# Patient Record
Sex: FEMALE | Race: WHITE | NOT HISPANIC OR LATINO | Employment: UNEMPLOYED | URBAN - NONMETROPOLITAN AREA
[De-identification: names, ages, dates, MRNs, and addresses within clinical notes are randomized per-mention and may not be internally consistent; named-entity substitution may affect disease eponyms.]

---

## 2017-05-09 ENCOUNTER — HOSPITAL ENCOUNTER (EMERGENCY)
Facility: HOSPITAL | Age: 22
Discharge: HOME | End: 2017-05-09
Attending: STUDENT IN AN ORGANIZED HEALTH CARE EDUCATION/TRAINING PROGRAM | Admitting: STUDENT IN AN ORGANIZED HEALTH CARE EDUCATION/TRAINING PROGRAM

## 2017-05-09 VITALS
OXYGEN SATURATION: 99 % | HEIGHT: 59 IN | SYSTOLIC BLOOD PRESSURE: 113 MMHG | WEIGHT: 87 LBS | DIASTOLIC BLOOD PRESSURE: 77 MMHG | HEART RATE: 104 BPM | TEMPERATURE: 98.5 F | BODY MASS INDEX: 17.54 KG/M2 | RESPIRATION RATE: 18 BRPM

## 2017-05-09 DIAGNOSIS — R35.0 URINARY FREQUENCY: Primary | ICD-10-CM

## 2017-05-09 LAB
B-HCG UR QL: NEGATIVE
BILIRUB UR QL STRIP: NEGATIVE
CLARITY UR: CLEAR
COLOR UR: YELLOW
GLUCOSE UR STRIP-MCNC: NEGATIVE MG/DL
HGB UR QL STRIP.AUTO: NEGATIVE
KETONES UR QL STRIP: NEGATIVE
LEUKOCYTE ESTERASE UR QL STRIP.AUTO: NEGATIVE
NITRITE UR QL STRIP: NEGATIVE
PH UR STRIP.AUTO: 6.5 [PH] (ref 5–8)
PROT UR QL STRIP: NEGATIVE
SP GR UR STRIP: 1.01 (ref 1–1.03)
UROBILINOGEN UR QL STRIP: NORMAL

## 2017-05-09 PROCEDURE — 81025 URINE PREGNANCY TEST: CPT | Performed by: STUDENT IN AN ORGANIZED HEALTH CARE EDUCATION/TRAINING PROGRAM

## 2017-05-09 PROCEDURE — 99283 EMERGENCY DEPT VISIT LOW MDM: CPT

## 2017-05-09 PROCEDURE — 81003 URINALYSIS AUTO W/O SCOPE: CPT | Performed by: STUDENT IN AN ORGANIZED HEALTH CARE EDUCATION/TRAINING PROGRAM

## 2017-07-06 ENCOUNTER — APPOINTMENT (OUTPATIENT)
Dept: CT IMAGING | Facility: HOSPITAL | Age: 22
End: 2017-07-06

## 2017-07-06 ENCOUNTER — HOSPITAL ENCOUNTER (EMERGENCY)
Facility: HOSPITAL | Age: 22
Discharge: HOME | End: 2017-07-06
Attending: EMERGENCY MEDICINE | Admitting: EMERGENCY MEDICINE

## 2017-07-06 VITALS
TEMPERATURE: 98 F | OXYGEN SATURATION: 100 % | DIASTOLIC BLOOD PRESSURE: 85 MMHG | WEIGHT: 87 LBS | RESPIRATION RATE: 18 BRPM | BODY MASS INDEX: 17.54 KG/M2 | SYSTOLIC BLOOD PRESSURE: 115 MMHG | HEART RATE: 78 BPM | HEIGHT: 59 IN

## 2017-07-06 DIAGNOSIS — N83.201 CYST OF RIGHT OVARY: ICD-10-CM

## 2017-07-06 DIAGNOSIS — R10.12 LEFT UPPER QUADRANT PAIN: Primary | ICD-10-CM

## 2017-07-06 LAB
ALBUMIN SERPL-MCNC: 4.4 G/DL (ref 3.5–5)
ALBUMIN/GLOB SERPL: 1.3 G/DL (ref 1–2)
ALP SERPL-CCNC: 62 U/L (ref 38–126)
ALT SERPL W P-5'-P-CCNC: 31 U/L (ref 13–69)
ANION GAP SERPL CALCULATED.3IONS-SCNC: 16.3 MMOL/L
AST SERPL-CCNC: 30 U/L (ref 15–46)
BASOPHILS # BLD AUTO: 0.01 10*3/MM3 (ref 0–0.2)
BASOPHILS NFR BLD AUTO: 0.2 % (ref 0–2.5)
BILIRUB SERPL-MCNC: 0.5 MG/DL (ref 0.2–1.3)
BILIRUB UR QL STRIP: NEGATIVE
BUN BLD-MCNC: 13 MG/DL (ref 7–20)
BUN/CREAT SERPL: 14.4 (ref 7.1–23.5)
CALCIUM SPEC-SCNC: 10.6 MG/DL (ref 8.4–10.2)
CHLORIDE SERPL-SCNC: 104 MMOL/L (ref 98–107)
CLARITY UR: CLEAR
CO2 SERPL-SCNC: 27 MMOL/L (ref 26–30)
COLOR UR: YELLOW
CREAT BLD-MCNC: 0.9 MG/DL (ref 0.6–1.3)
DEPRECATED RDW RBC AUTO: 40.4 FL (ref 37–54)
EOSINOPHIL # BLD AUTO: 0.15 10*3/MM3 (ref 0–0.7)
EOSINOPHIL NFR BLD AUTO: 2.5 % (ref 0–7)
ERYTHROCYTE [DISTWIDTH] IN BLOOD BY AUTOMATED COUNT: 15.3 % (ref 11.5–14.5)
GFR SERPL CREATININE-BSD FRML MDRD: 78 ML/MIN/1.73
GLOBULIN UR ELPH-MCNC: 3.4 GM/DL
GLUCOSE BLD-MCNC: 77 MG/DL (ref 74–98)
GLUCOSE UR STRIP-MCNC: NEGATIVE MG/DL
HCG SERPL QL: NEGATIVE
HCT VFR BLD AUTO: 37.4 % (ref 37–47)
HGB BLD-MCNC: 11.7 G/DL (ref 12–16)
HGB UR QL STRIP.AUTO: NEGATIVE
HOLD SPECIMEN: NORMAL
HOLD SPECIMEN: NORMAL
IMM GRANULOCYTES # BLD: 0.03 10*3/MM3 (ref 0–0.06)
IMM GRANULOCYTES NFR BLD: 0.5 % (ref 0–0.6)
KETONES UR QL STRIP: NEGATIVE
LEUKOCYTE ESTERASE UR QL STRIP.AUTO: NEGATIVE
LIPASE SERPL-CCNC: 115 U/L (ref 23–300)
LYMPHOCYTES # BLD AUTO: 1.32 10*3/MM3 (ref 0.6–3.4)
LYMPHOCYTES NFR BLD AUTO: 22 % (ref 10–50)
MCH RBC QN AUTO: 23.1 PG (ref 27–31)
MCHC RBC AUTO-ENTMCNC: 31.3 G/DL (ref 30–37)
MCV RBC AUTO: 73.9 FL (ref 81–99)
MICROCYTES BLD QL: NORMAL
MONOCYTES # BLD AUTO: 0.52 10*3/MM3 (ref 0–0.9)
MONOCYTES NFR BLD AUTO: 8.7 % (ref 0–12)
NEUTROPHILS # BLD AUTO: 3.97 10*3/MM3 (ref 2–6.9)
NEUTROPHILS NFR BLD AUTO: 66.1 % (ref 37–80)
NITRITE UR QL STRIP: NEGATIVE
NRBC BLD MANUAL-RTO: 0 /100 WBC (ref 0–0)
PH UR STRIP.AUTO: 7 [PH] (ref 5–8)
PLAT MORPH BLD: NORMAL
PLATELET # BLD AUTO: 206 10*3/MM3 (ref 130–400)
PMV BLD AUTO: 10.7 FL (ref 6–12)
POTASSIUM BLD-SCNC: 4.3 MMOL/L (ref 3.5–5.1)
PROT SERPL-MCNC: 7.8 G/DL (ref 6.3–8.2)
PROT UR QL STRIP: NEGATIVE
RBC # BLD AUTO: 5.06 10*6/MM3 (ref 4.2–5.4)
SODIUM BLD-SCNC: 143 MMOL/L (ref 137–145)
SP GR UR STRIP: 1.01 (ref 1–1.03)
UROBILINOGEN UR QL STRIP: NORMAL
WBC MORPH BLD: NORMAL
WBC NRBC COR # BLD: 6 10*3/MM3 (ref 4.8–10.8)
WHOLE BLOOD HOLD SPECIMEN: NORMAL
WHOLE BLOOD HOLD SPECIMEN: NORMAL

## 2017-07-06 PROCEDURE — 84703 CHORIONIC GONADOTROPIN ASSAY: CPT | Performed by: EMERGENCY MEDICINE

## 2017-07-06 PROCEDURE — 96361 HYDRATE IV INFUSION ADD-ON: CPT

## 2017-07-06 PROCEDURE — 25010000002 ONDANSETRON PER 1 MG: Performed by: EMERGENCY MEDICINE

## 2017-07-06 PROCEDURE — 74177 CT ABD & PELVIS W/CONTRAST: CPT

## 2017-07-06 PROCEDURE — 80053 COMPREHEN METABOLIC PANEL: CPT | Performed by: EMERGENCY MEDICINE

## 2017-07-06 PROCEDURE — 96375 TX/PRO/DX INJ NEW DRUG ADDON: CPT

## 2017-07-06 PROCEDURE — 81003 URINALYSIS AUTO W/O SCOPE: CPT | Performed by: EMERGENCY MEDICINE

## 2017-07-06 PROCEDURE — 99284 EMERGENCY DEPT VISIT MOD MDM: CPT

## 2017-07-06 PROCEDURE — 83690 ASSAY OF LIPASE: CPT | Performed by: EMERGENCY MEDICINE

## 2017-07-06 PROCEDURE — 0 IOPAMIDOL 61 % SOLUTION: Performed by: EMERGENCY MEDICINE

## 2017-07-06 PROCEDURE — 85007 BL SMEAR W/DIFF WBC COUNT: CPT | Performed by: EMERGENCY MEDICINE

## 2017-07-06 PROCEDURE — 85025 COMPLETE CBC W/AUTO DIFF WBC: CPT | Performed by: EMERGENCY MEDICINE

## 2017-07-06 PROCEDURE — 96374 THER/PROPH/DIAG INJ IV PUSH: CPT

## 2017-07-06 RX ORDER — FAMOTIDINE 40 MG/1
40 TABLET, FILM COATED ORAL DAILY
Qty: 30 TABLET | Refills: 0 | Status: SHIPPED | OUTPATIENT
Start: 2017-07-06 | End: 2018-07-24

## 2017-07-06 RX ORDER — ALUMINA, MAGNESIA, AND SIMETHICONE 2400; 2400; 240 MG/30ML; MG/30ML; MG/30ML
15 SUSPENSION ORAL ONCE
Status: COMPLETED | OUTPATIENT
Start: 2017-07-06 | End: 2017-07-06

## 2017-07-06 RX ORDER — PANTOPRAZOLE SODIUM 40 MG/10ML
40 INJECTION, POWDER, LYOPHILIZED, FOR SOLUTION INTRAVENOUS ONCE
Status: COMPLETED | OUTPATIENT
Start: 2017-07-06 | End: 2017-07-06

## 2017-07-06 RX ORDER — SODIUM CHLORIDE 0.9 % (FLUSH) 0.9 %
10 SYRINGE (ML) INJECTION AS NEEDED
Status: DISCONTINUED | OUTPATIENT
Start: 2017-07-06 | End: 2017-07-06 | Stop reason: HOSPADM

## 2017-07-06 RX ORDER — ONDANSETRON 2 MG/ML
4 INJECTION INTRAMUSCULAR; INTRAVENOUS ONCE
Status: COMPLETED | OUTPATIENT
Start: 2017-07-06 | End: 2017-07-06

## 2017-07-06 RX ADMIN — SODIUM CHLORIDE 1000 ML: 9 INJECTION, SOLUTION INTRAVENOUS at 11:03

## 2017-07-06 RX ADMIN — ALUMINUM HYDROXIDE, MAGNESIUM HYDROXIDE, AND DIMETHICONE 15 ML: 400; 400; 40 SUSPENSION ORAL at 11:05

## 2017-07-06 RX ADMIN — ONDANSETRON 4 MG: 2 INJECTION INTRAMUSCULAR; INTRAVENOUS at 11:05

## 2017-07-06 RX ADMIN — PANTOPRAZOLE SODIUM 40 MG: 40 INJECTION, POWDER, FOR SOLUTION INTRAVENOUS at 11:04

## 2017-07-06 RX ADMIN — LIDOCAINE HYDROCHLORIDE 15 ML: 20 SOLUTION ORAL; TOPICAL at 11:05

## 2017-07-06 RX ADMIN — IOPAMIDOL 100 ML: 612 INJECTION, SOLUTION INTRAVENOUS at 10:52

## 2017-07-06 NOTE — ED PROVIDER NOTES
Subjective   HPI Comments: 21-year-old female presenting with abdominal pain.  She states that for the last week she has felt intermittently nauseous, especially food.  The last 10 hours she's had severe sharp left upper quadrant pain, this doesn't radiate, there are no alleviating or aggravating factors.  She states that her mother has a history of an ulcer that she is concerned about the same thing and presented for evaluation.  She denies any fevers, chills, urinary complaints, flank pain, vaginal bleeding or discharge.  She states she has had other abdominal pain before but nothing similar to this.      Review of Systems   Constitutional: Negative for chills and fever.   HENT: Negative for congestion, rhinorrhea and sore throat.    Eyes: Negative for pain.   Respiratory: Negative for cough and shortness of breath.    Cardiovascular: Negative for chest pain, palpitations and leg swelling.   Gastrointestinal: Positive for abdominal pain and nausea. Negative for diarrhea and vomiting.   Genitourinary: Negative for dysuria.   Musculoskeletal: Negative for arthralgias.   Skin: Negative for rash.   Neurological: Negative for weakness and numbness.   Psychiatric/Behavioral: Negative for behavioral problems.       Past Medical History:   Diagnosis Date   • Depression    • GERD (gastroesophageal reflux disease)        Allergies   Allergen Reactions   • Latex Hives       Past Surgical History:   Procedure Laterality Date   •  SECTION      1   • EAR TUBES         Family History   Problem Relation Age of Onset   • Cancer Other    • Diabetes Other    • Hypertension Other    • Hyperlipidemia Other    • Liver disease Other      aunt has liver disease from hep c   • Mental illness Other    • Thyroid disease Other        Social History     Social History   • Marital status:      Spouse name: N/A   • Number of children: N/A   • Years of education: N/A     Social History Main Topics   • Smoking status: Former Smoker    • Smokeless tobacco: None   • Alcohol use No   • Drug use: No   • Sexual activity: Not Asked     Other Topics Concern   • None     Social History Narrative   • None           Objective   Physical Exam   Constitutional: She is oriented to person, place, and time. She appears well-developed and well-nourished. No distress.   HENT:   Head: Normocephalic and atraumatic.   Right Ear: External ear normal.   Left Ear: External ear normal.   Nose: Nose normal.   Mouth/Throat: Oropharynx is clear and moist.   Eyes: Conjunctivae and EOM are normal. Pupils are equal, round, and reactive to light.   Neck: Normal range of motion. Neck supple.   Cardiovascular: Normal rate, regular rhythm, normal heart sounds and intact distal pulses.    Pulmonary/Chest: Effort normal and breath sounds normal. No respiratory distress.   Abdominal: Soft. Bowel sounds are normal. She exhibits no distension. There is no rebound and no guarding.   Left upper quadrant and epigastric tenderness   Musculoskeletal: Normal range of motion. She exhibits no edema, tenderness or deformity.   Neurological: She is alert and oriented to person, place, and time.   Skin: Skin is warm and dry. No rash noted.   Psychiatric: She has a normal mood and affect. Her behavior is normal.   Nursing note and vitals reviewed.      Procedures         ED Course  ED Course                  MDM  Number of Diagnoses or Management Options  Cyst of right ovary:   Left upper quadrant pain:   Diagnosis management comments: 21-year-old female with abdominal pain and nausea.  Well-developed, well-nourished female in no distress with normal vital signs and exam as above.  We'll check labs, urinalysis, pregnancy test, CT scan.  We'll treat symptomatically.  Disposition pending workup.    DDX: Gastritis, biliary disease, ulcer disease, stone, UTI, pregnancy    Work up here unremarkable. CT shows incidental R ovarian cyst. Will discharge home with primary, GI and GYN follow  up.      Final diagnoses:   Left upper quadrant pain   Cyst of right ovary            Phill Abdi MD  07/06/17 7702

## 2017-08-22 ENCOUNTER — OFFICE VISIT (OUTPATIENT)
Dept: GASTROENTEROLOGY | Facility: CLINIC | Age: 22
End: 2017-08-22

## 2017-08-22 ENCOUNTER — PREP FOR SURGERY (OUTPATIENT)
Dept: OTHER | Facility: HOSPITAL | Age: 22
End: 2017-08-22

## 2017-08-22 VITALS
BODY MASS INDEX: 19.15 KG/M2 | RESPIRATION RATE: 14 BRPM | HEART RATE: 82 BPM | SYSTOLIC BLOOD PRESSURE: 105 MMHG | DIASTOLIC BLOOD PRESSURE: 75 MMHG | WEIGHT: 95 LBS | HEIGHT: 59 IN

## 2017-08-22 DIAGNOSIS — K59.00 CONSTIPATION, UNSPECIFIED CONSTIPATION TYPE: Chronic | ICD-10-CM

## 2017-08-22 DIAGNOSIS — R12 HEARTBURN: ICD-10-CM

## 2017-08-22 DIAGNOSIS — R10.13 EPIGASTRIC PAIN: Primary | Chronic | ICD-10-CM

## 2017-08-22 DIAGNOSIS — R11.0 NAUSEA: Primary | ICD-10-CM

## 2017-08-22 DIAGNOSIS — R11.0 NAUSEA: Chronic | ICD-10-CM

## 2017-08-22 DIAGNOSIS — R12 HEARTBURN: Chronic | ICD-10-CM

## 2017-08-22 DIAGNOSIS — R10.12 LEFT UPPER QUADRANT PAIN: ICD-10-CM

## 2017-08-22 DIAGNOSIS — R10.13 EPIGASTRIC PAIN: ICD-10-CM

## 2017-08-22 PROCEDURE — 99243 OFF/OP CNSLTJ NEW/EST LOW 30: CPT | Performed by: NURSE PRACTITIONER

## 2017-08-22 RX ORDER — SODIUM CHLORIDE 0.9 % (FLUSH) 0.9 %
1-10 SYRINGE (ML) INJECTION AS NEEDED
Status: CANCELLED | OUTPATIENT
Start: 2017-08-22

## 2017-08-22 RX ORDER — SODIUM CHLORIDE 9 MG/ML
70 INJECTION, SOLUTION INTRAVENOUS CONTINUOUS PRN
Status: CANCELLED | OUTPATIENT
Start: 2017-08-22

## 2017-08-22 NOTE — PROGRESS NOTES
[address]  [facility] [place] [address]    Chief Complaint   Patient presents with   • Abdominal Pain   • Nausea   • Heartburn     The patient has a history of epigastric/left upper quadrant since June 2017. The pain is constant. It is mild in nature. At times it is severe. It will radiate pain into her chest at times. The pains are sharp. Lifting items or carrying her child on her left hip will increase the pain. She has bee taking Pepcid and Protonix, without improvement.    The patient has been having nausea since June 2017. The nausea occurs throughout the day on a daily basis. She has not taken anything for the nausea. Nausea seems to be worse after meals.    The patient has a long-standing history of heartburn. It has not changed. Heartburn may occur 1-2 times per week. Heartburn is mild. It does wake her in the night. The patient does drink 4 cups of coffee and 4 cans of soda daily. The patient does not take NSAIDs.    The patient has a long-standing history of constipation. The patient may have 1 hard bowel movement once every 2 weeks. The patient is not taking anything for the constipation. There is no history of diarrhea. The patient denies bright red blood per rectum or melena.    The patient has not had colonoscopy in the past. No family history of colon cancer.    Abdominal Pain   This is a recurrent problem. Episode onset: June 2016. The onset quality is sudden. The problem occurs constantly. The problem has been unchanged. The pain is located in the epigastric region and LUQ. The pain is mild. The quality of the pain is sharp. The abdominal pain radiates to the chest. Associated symptoms include dysuria, headaches and nausea. Pertinent negatives include no arthralgias, constipation, diarrhea, fever, hematuria, myalgias or vomiting. Exacerbated by: lifting, holding children on her hip. The pain is relieved by nothing. She has tried proton pump inhibitors and H2 blockers for the symptoms. The  treatment provided no relief. Her past medical history is significant for GERD.   Nausea   This is a recurrent problem. Episode onset: June 2016. The problem occurs 2 to 4 times per day. The problem has been unchanged. Associated symptoms include abdominal pain, fatigue, headaches, nausea and weakness. Pertinent negatives include no arthralgias, chest pain, chills, coughing, fever, joint swelling, myalgias, rash or vomiting. The symptoms are aggravated by eating. She has tried nothing for the symptoms.   Heartburn   She complains of abdominal pain, heartburn and nausea. She reports no chest pain or no coughing. This is a chronic problem. Episode onset: 2014. The problem occurs occasionally. The problem has been unchanged. The heartburn duration is an hour. The heartburn is located in the substernum. The heartburn is of mild intensity. The heartburn wakes her from sleep. Nothing aggravates the symptoms. Associated symptoms include fatigue. Risk factors include caffeine use (The patient drinks 4 cups of coffee daily and 4 cans of soda daily.). She has tried a PPI and a histamine-2 antagonist for the symptoms. The treatment provided mild relief.   Constipation   This is a chronic problem. Episode onset: 2015. The problem is unchanged. Stool frequency: once every 2 weeks. The stool is described as firm. The patient is not on a high fiber diet. She exercises regularly. There has been adequate water intake. Associated symptoms include abdominal pain, back pain and nausea. Pertinent negatives include no diarrhea, fever or vomiting. She has tried nothing for the symptoms.     Review of Systems   Constitutional: Positive for appetite change, fatigue and unexpected weight change. Negative for chills and fever.   HENT: Negative for mouth sores, nosebleeds and trouble swallowing.    Eyes: Negative for discharge and redness.   Respiratory: Positive for shortness of breath. Negative for apnea and cough.    Cardiovascular:  Negative for chest pain, palpitations and leg swelling.   Gastrointestinal: Positive for abdominal pain, heartburn and nausea. Negative for abdominal distention, anal bleeding, blood in stool, constipation, diarrhea and vomiting.   Endocrine: Negative for cold intolerance, heat intolerance and polydipsia.   Genitourinary: Positive for dysuria. Negative for hematuria and urgency.   Musculoskeletal: Positive for back pain. Negative for arthralgias, joint swelling and myalgias.   Skin: Negative for rash.   Allergic/Immunologic: Negative for food allergies and immunocompromised state.   Neurological: Positive for dizziness, weakness and headaches. Negative for seizures and syncope.   Hematological: Negative for adenopathy. Bruises/bleeds easily.   Psychiatric/Behavioral: Negative for dysphoric mood. The patient is nervous/anxious. The patient is not hyperactive.      Patient Active Problem List   Diagnosis   • HPV in female   • Epigastric pain   • Left upper quadrant pain   • Nausea   • Heartburn   • Constipation     Past Medical History:   Diagnosis Date   • Abnormal Pap smear of cervix 2015    ASCUS +HPV   • Depression    • GERD (gastroesophageal reflux disease)    • History of blood transfusion    • History of Papanicolaou smear of cervix 2016    NORMAL    • HPV in female      Past Surgical History:   Procedure Laterality Date   •  SECTION  2016    DR MARC SALINAS   • EAR TUBES       Family History   Problem Relation Age of Onset   • Cancer Other    • Diabetes Other    • Hypertension Other    • Hyperlipidemia Other    • Liver disease Other      aunt has liver disease from hep c   • Mental illness Other    • Thyroid disease Other    • Ulcers Mother      Social History   Substance Use Topics   • Smoking status: Never Smoker   • Smokeless tobacco: Never Used   • Alcohol use No       Current Outpatient Prescriptions:   •  citalopram (CeleXA) 20 MG tablet, TAKE 1 TABLET BY MOUTH DAILY, Disp: ,  "Rfl: 2  •  gabapentin (NEURONTIN) 300 MG capsule, TAKE 1 CAPSULE BY MOUTH EVERY NIGHT., Disp: 30 capsule, Rfl: 1  •  famotidine (PEPCID) 40 MG tablet, Take 1 tablet by mouth Daily., Disp: 30 tablet, Rfl: 0  •  pantoprazole (PROTONIX) 40 MG EC tablet, Take 1 tablet by mouth Daily., Disp: 30 tablet, Rfl: 1    Allergies   Allergen Reactions   • Latex Hives     /75  Pulse 82  Resp 14  Ht 59\" (149.9 cm)  Wt 95 lb (43.1 kg)  LMP 06/19/2017  BMI 19.19 kg/m2    Physical Exam   Constitutional: She is oriented to person, place, and time. She appears well-developed and well-nourished. No distress.   HENT:   Head: Normocephalic and atraumatic.   Right Ear: Hearing and external ear normal.   Left Ear: Hearing and external ear normal.   Nose: Nose normal.   Mouth/Throat: Oropharynx is clear and moist and mucous membranes are normal. Mucous membranes are not pale, not dry and not cyanotic. No oral lesions. No oropharyngeal exudate.   Eyes: Conjunctivae and EOM are normal. Right eye exhibits no discharge. Left eye exhibits no discharge.   Neck: Trachea normal. Neck supple. No JVD present. No edema present. No thyroid mass and no thyromegaly present.   Cardiovascular: Normal rate, regular rhythm, S2 normal and normal heart sounds.  Exam reveals no gallop, no S3 and no friction rub.    No murmur heard.  Pulmonary/Chest: Effort normal and breath sounds normal. No respiratory distress. She exhibits no tenderness.   Abdominal: Normal appearance and bowel sounds are normal. She exhibits no distension, no ascites and no mass. There is no splenomegaly or hepatomegaly. There is tenderness (moderate) in the epigastric area and left upper quadrant. There is no rigidity, no rebound and no guarding. No hernia.       Vascular Status -  Her exam exhibits no right foot edema. Her exam exhibits no left foot edema.  Lymphadenopathy:     She has no cervical adenopathy.        Left: No supraclavicular adenopathy present.   Neurological: " She is alert and oriented to person, place, and time. She has normal strength. No cranial nerve deficit or sensory deficit.   Skin: No rash noted. She is not diaphoretic. No cyanosis. No pallor. Nails show no clubbing.   Psychiatric: She has a normal mood and affect.   Nursing note and vitals reviewed.  Stigmata of chronic liver disease:  None.  Asterixis:  None.    Laboratory Results:  Upon review of records:    Dated 7/6/2017 glucose 77 BUN 13 creatinine 0.9 sodium 143 potassium 4.3 chloride 104 CO2 27 calcium 10.6 albumin 4.4 ALT 31 AST 30 alkaline phosphatase 62 total bilirubin 0.5 WBC 6.0 hemoglobin 11.7 hematocrit 37.4 platelet count 206 MCV 73.9 lipase 1:15 HCV G qualitative: Negative    Abdominal Imaging:  Upon review of records:    Abdominal ultrasound dated 12/13/2016 reveals Limited images of the pancreas are unremarkable.  The liver parenchyma is normal in echogenicity.  The gallbladder is normal with no shadowing stones or wall thickening.  There is no pericholecystic fluid.  The common duct measures 2.6 mm.  Limited images of the right kidney are unremarkable.    CT of abdomen and pelvis with contrast dated 7/6/2017 reveals lung bases clear.  Liver has an unremarkable CT appearance.  The spleen, pancreas and adrenal glands are unremarkable.  Kidney showed no mass or obstruction.  Diastases recti is noted.  No bowel obstruction or fluid collection is seen.  The appendix is not visualized.  Pelvic loops are unremarkable.  No adenopathy is seen.  There is a 2.1 cm right ovarian cyst.    Assessment and Plan:      Cortney was seen today for abdominal pain, nausea and heartburn.    Diagnoses and all orders for this visit:    Epigastric pain  Comments:  Differentials include Peptic ulcer disease, pancreatobiliary disease.    Left upper quadrant pain  Comments:  Differentials include peptic ulcer disease, pancreatobiliary disease.    Nausea  Comments:  Differentials include peptic ulcer disease,  pancreatobiliary disease.    Heartburn  Comments:  History of long-standing heartburn.  Not controlled with pantoprazole, Pepcid.  Concerns for underlying Buchanan's.    Constipation, unspecified constipation type  Comments:  History of long-standing constipation.        Plan  and Patient Instructions:  Patient Instructions   1. Antireflux measures: Avoid fried, fatty foods, alcohol, chocolate, coffee, tea,  soft drinks, peppermint and spearmint, spicy foods, tomatoes and tomato based foods, onion based foods, and smoking. Other antireflux measures include weight reduction if overweight, avoiding tight clothing around the abdomen, elevating the head of the bed 6 inches with blocks under the head board, and don't drink or eat before going to bed and avoid lying down immediately after meals.  2. Pantoprazole 40 mg 1 po daily in the am 30 minutes before breakfast.  3. May take Pepcid 40 1-2 days as needed, then hold 1-2 days before taking again.  4. High fiber diet with liberal water intake. Discussed in detail.  5. Upper endoscopy-EGD: Description of the procedure, risks, benefits, alternatives and options, including nonoperative options, were discussed with the patient in detail. The patient understands and wishes to proceed.    Patient Care Team:  Zeina Spann MD as PCP - General (Family Medicine)    YANA Coleman

## 2017-08-22 NOTE — PATIENT INSTRUCTIONS
1. Antireflux measures: Avoid fried, fatty foods, alcohol, chocolate, coffee, tea,  soft drinks, peppermint and spearmint, spicy foods, tomatoes and tomato based foods, onion based foods, and smoking. Other antireflux measures include weight reduction if overweight, avoiding tight clothing around the abdomen, elevating the head of the bed 6 inches with blocks under the head board, and don't drink or eat before going to bed and avoid lying down immediately after meals.  2. Pantoprazole 40 mg 1 po daily in the am 30 minutes before breakfast.  3. May take Pepcid 40 1-2 days as needed, then hold 1-2 days before taking again.  4. High fiber diet with liberal water intake. Discussed in detail.  5. Upper endoscopy-EGD: Description of the procedure, risks, benefits, alternatives and options, including nonoperative options, were discussed with the patient in detail. The patient understands and wishes to proceed.

## 2017-08-24 ENCOUNTER — HOSPITAL ENCOUNTER (OUTPATIENT)
Facility: HOSPITAL | Age: 22
Setting detail: HOSPITAL OUTPATIENT SURGERY
End: 2017-08-24
Attending: INTERNAL MEDICINE | Admitting: INTERNAL MEDICINE

## 2017-08-24 VITALS — BODY MASS INDEX: 19.56 KG/M2 | HEIGHT: 59 IN | WEIGHT: 97 LBS

## 2017-08-31 ENCOUNTER — OFFICE VISIT (OUTPATIENT)
Dept: OBSTETRICS AND GYNECOLOGY | Facility: CLINIC | Age: 22
End: 2017-08-31

## 2017-08-31 VITALS
DIASTOLIC BLOOD PRESSURE: 62 MMHG | BODY MASS INDEX: 16.39 KG/M2 | WEIGHT: 96 LBS | SYSTOLIC BLOOD PRESSURE: 122 MMHG | HEIGHT: 64 IN

## 2017-08-31 DIAGNOSIS — R10.2 PELVIC PAIN: Primary | ICD-10-CM

## 2017-08-31 DIAGNOSIS — N83.201 CYST OF RIGHT OVARY: ICD-10-CM

## 2017-08-31 PROCEDURE — 99213 OFFICE O/P EST LOW 20 MIN: CPT | Performed by: OBSTETRICS & GYNECOLOGY

## 2017-09-07 ENCOUNTER — TRANSCRIBE ORDERS (OUTPATIENT)
Dept: ADMINISTRATIVE | Facility: HOSPITAL | Age: 22
End: 2017-09-07

## 2017-09-07 DIAGNOSIS — R10.9 ABDOMINAL DISCOMFORT: Primary | ICD-10-CM

## 2017-09-15 ENCOUNTER — APPOINTMENT (OUTPATIENT)
Dept: ULTRASOUND IMAGING | Facility: HOSPITAL | Age: 22
End: 2017-09-15

## 2017-10-19 ENCOUNTER — OFFICE VISIT (OUTPATIENT)
Dept: OBSTETRICS AND GYNECOLOGY | Facility: CLINIC | Age: 22
End: 2017-10-19

## 2017-10-19 VITALS
WEIGHT: 96 LBS | HEIGHT: 64 IN | BODY MASS INDEX: 16.39 KG/M2 | DIASTOLIC BLOOD PRESSURE: 60 MMHG | SYSTOLIC BLOOD PRESSURE: 122 MMHG

## 2017-10-19 DIAGNOSIS — Z87.42 HISTORY OF ABNORMAL CERVICAL PAP SMEAR: ICD-10-CM

## 2017-10-19 DIAGNOSIS — N83.201 CYST OF RIGHT OVARY: Primary | ICD-10-CM

## 2017-10-19 DIAGNOSIS — R10.12 LEFT UPPER QUADRANT PAIN: ICD-10-CM

## 2017-10-19 PROCEDURE — 99214 OFFICE O/P EST MOD 30 MIN: CPT | Performed by: OBSTETRICS & GYNECOLOGY

## 2017-10-19 NOTE — PROGRESS NOTES
Subjective  Chief Complaint   Patient presents with   • Follow-up     TRANSVAGINAL ULTRASOUND, PELVIC PAIN, RIGHT OVARIAN CYST.      Patient is 22 y.o.  here for f/u TVS.  Pt previously seen with complaints of predominately left upper quadrant pain.  Pt undergoing GI evaluation.  Pt had abdominal scan scheduled but missed appointment.  Pt also to have EGD with Dr. Hudson.  Pt had TVS done here  to r/o other causes for pain as patient has history of ovarian cysts.  Pt with moderate amount of free fluid seen at that time.  Pt with regular menses.  Pt reports being due for pap; history of abnormal paps in past.  Last pap 2016.    History  Past Medical History:   Diagnosis Date   • Abnormal Pap smear of cervix 2015    ASCUS +HPV   • Anxiety and depression    • Body piercing     LOWER LIP, TOP OF LEFT EAR   • Constipation    • Depression    • GERD (gastroesophageal reflux disease)    • Hearing loss     REPORTS USES NO HEARING DEVICES   • History of blood transfusion 2016    REPORTS NO REACTION   • History of Papanicolaou smear of cervix 2016    NORMAL    • HPV in female    • Hypoglycemia    • Migraine    • Ovarian cyst 2017    RIGHT    • Tattoos     1 ON LEFT WRIST, BILATERAL SHOULDER BLADES, LEFT LEG     Current Outpatient Prescriptions on File Prior to Visit   Medication Sig Dispense Refill   • citalopram (CeleXA) 20 MG tablet TAKE 1 TABLET BY MOUTH DAILY  2   • famotidine (PEPCID) 40 MG tablet Take 1 tablet by mouth Daily. 30 tablet 0   • gabapentin (NEURONTIN) 300 MG capsule TAKE 1 CAPSULE BY MOUTH EVERY NIGHT. 30 capsule 1   • pantoprazole (PROTONIX) 40 MG EC tablet Take 1 tablet by mouth Daily. 30 tablet 1     No current facility-administered medications on file prior to visit.      Allergies   Allergen Reactions   • Latex Hives     Past Surgical History:   Procedure Laterality Date   •  SECTION  2016    DR MARC SALINAS   • EAR TUBES       Family History   Problem Relation Age of  "Onset   • Cancer Other    • Diabetes Other    • Hypertension Other    • Hyperlipidemia Other    • Liver disease Other      aunt has liver disease from hep c   • Mental illness Other    • Thyroid disease Other    • Ulcers Mother      Social History     Social History   • Marital status:      Spouse name: N/A   • Number of children: N/A   • Years of education: N/A     Social History Main Topics   • Smoking status: Never Smoker   • Smokeless tobacco: Never Used   • Alcohol use No   • Drug use: No   • Sexual activity: Yes     Partners: Male     Other Topics Concern   • None     Social History Narrative     Review of Systems  All systems were reviewed and negative except for:  Genitourinary: postivie for  pelvic pain     Objective  Vitals:    10/19/17 1125   BP: 122/60   Weight: 96 lb (43.5 kg)   Height: 64\" (162.6 cm)     Physical Exam:  General Appearance: alert, appears stated age and cooperative  Head: normocephalic, without obvious abnormality and atraumatic  Eyes: lids and lashes normal, conjunctivae and sclerae normal, no icterus, no pallor, corneas clear and PERRLA  Ears: ears appear intact with no abnormalities noted  Nose: nares normal, septum midline, mucosa normal and no drainage  Neck: suppple, trachea midline and no thyromegaly  Lungs: clear to auscultation, respirations regular, respirations even and respirations unlabored  Heart: regular rhythm and normal rate, normal S1, S2, no murmur, gallop, or rubs and no click  Breasts: Not performed.  Abdomen: normal bowel sounds, no masses, no hepatomegaly, no splenomegaly, soft non-tender, no guarding and no rebound tenderness  Pelvic: Not performed.  Extremities: moves extremities well, no edema, no cyanosis and no redness  Skin: no bleeding, bruising or rash and no lesions noted  Lymph Nodes: no palpable adenopathy  Psych: normal mood and affect, oriented to person, time and place, thought content organized and appropriate judgment    Lab Review   No data " reviewed    Imaging   Pelvic ultrasound images independantly reviewed - TVS today shows normal uterus; ET 11 mm; Multiple small follicular cysts seen on right ovary largest measuring 1.6 cm; small amount of free fluid seen; left adnexae normal.    Assessment/Plan    Problem List Items Addressed This Visit        Nervous and Auditory    Left upper quadrant pain  Patient with continued pain.  Pt has appointment scheduled for EGD.  Pt to reschedule abdominal scan.       Genitourinary    Ovarian cyst - Primary  Repeat scan today with findings as noted; resolution of moderate amount of free fluid.  Small functional cysts only seen today; pt desires to repeat; will repeat scan at time of annual.  Instructions and precautions given.    Relevant Orders    US Non-ob Transvaginal      Other Visit Diagnoses     History of abnormal cervical Pap smear      Patient with history of abnormal paps.  Pt to schedule annual with TVS.            Follow up as scheduled     This note was electronically signed.  Julissa Brian M.D.

## 2017-10-21 PROCEDURE — 36415 COLL VENOUS BLD VENIPUNCTURE: CPT

## 2017-10-21 PROCEDURE — 99283 EMERGENCY DEPT VISIT LOW MDM: CPT

## 2017-10-22 ENCOUNTER — HOSPITAL ENCOUNTER (EMERGENCY)
Facility: HOSPITAL | Age: 22
Discharge: HOME | End: 2017-10-22
Attending: EMERGENCY MEDICINE | Admitting: EMERGENCY MEDICINE

## 2017-10-22 ENCOUNTER — APPOINTMENT (OUTPATIENT)
Dept: ULTRASOUND IMAGING | Facility: HOSPITAL | Age: 22
End: 2017-10-22

## 2017-10-22 VITALS
RESPIRATION RATE: 17 BRPM | BODY MASS INDEX: 17.94 KG/M2 | OXYGEN SATURATION: 100 % | TEMPERATURE: 97.9 F | WEIGHT: 89 LBS | HEIGHT: 59 IN | DIASTOLIC BLOOD PRESSURE: 80 MMHG | SYSTOLIC BLOOD PRESSURE: 120 MMHG | HEART RATE: 92 BPM

## 2017-10-22 DIAGNOSIS — R10.13 EPIGASTRIC PAIN: Primary | ICD-10-CM

## 2017-10-22 LAB
ALBUMIN SERPL-MCNC: 4.9 G/DL (ref 3.5–5)
ALBUMIN/GLOB SERPL: 1.4 G/DL (ref 1–2)
ALP SERPL-CCNC: 56 U/L (ref 38–126)
ALT SERPL W P-5'-P-CCNC: 26 U/L (ref 13–69)
ANION GAP SERPL CALCULATED.3IONS-SCNC: 20.1 MMOL/L
AST SERPL-CCNC: 25 U/L (ref 15–46)
B-HCG UR QL: NEGATIVE
BASOPHILS # BLD AUTO: 0.01 10*3/MM3 (ref 0–0.2)
BASOPHILS NFR BLD AUTO: 0.1 % (ref 0–2.5)
BILIRUB SERPL-MCNC: 0.3 MG/DL (ref 0.2–1.3)
BILIRUB UR QL STRIP: NEGATIVE
BUN BLD-MCNC: 7 MG/DL (ref 7–20)
BUN/CREAT SERPL: 8.8 (ref 7.1–23.5)
CALCIUM SPEC-SCNC: 9.9 MG/DL (ref 8.4–10.2)
CHLORIDE SERPL-SCNC: 103 MMOL/L (ref 98–107)
CLARITY UR: CLEAR
CO2 SERPL-SCNC: 26 MMOL/L (ref 26–30)
COLOR UR: YELLOW
CREAT BLD-MCNC: 0.8 MG/DL (ref 0.6–1.3)
DEPRECATED RDW RBC AUTO: 41.8 FL (ref 37–54)
EOSINOPHIL # BLD AUTO: 0.08 10*3/MM3 (ref 0–0.7)
EOSINOPHIL NFR BLD AUTO: 1.1 % (ref 0–7)
ERYTHROCYTE [DISTWIDTH] IN BLOOD BY AUTOMATED COUNT: 15.1 % (ref 11.5–14.5)
GFR SERPL CREATININE-BSD FRML MDRD: 89 ML/MIN/1.73
GLOBULIN UR ELPH-MCNC: 3.6 GM/DL
GLUCOSE BLD-MCNC: 96 MG/DL (ref 74–98)
GLUCOSE UR STRIP-MCNC: NEGATIVE MG/DL
HCT VFR BLD AUTO: 40.7 % (ref 37–47)
HGB BLD-MCNC: 12.5 G/DL (ref 12–16)
HGB UR QL STRIP.AUTO: NEGATIVE
IMM GRANULOCYTES # BLD: 0.03 10*3/MM3 (ref 0–0.06)
IMM GRANULOCYTES NFR BLD: 0.4 % (ref 0–0.6)
KETONES UR QL STRIP: NEGATIVE
LEUKOCYTE ESTERASE UR QL STRIP.AUTO: NEGATIVE
LYMPHOCYTES # BLD AUTO: 1.77 10*3/MM3 (ref 0.6–3.4)
LYMPHOCYTES NFR BLD AUTO: 24.7 % (ref 10–50)
MCH RBC QN AUTO: 23.5 PG (ref 27–31)
MCHC RBC AUTO-ENTMCNC: 30.7 G/DL (ref 30–37)
MCV RBC AUTO: 76.5 FL (ref 81–99)
MONOCYTES # BLD AUTO: 0.39 10*3/MM3 (ref 0–0.9)
MONOCYTES NFR BLD AUTO: 5.4 % (ref 0–12)
NEUTROPHILS # BLD AUTO: 4.9 10*3/MM3 (ref 2–6.9)
NEUTROPHILS NFR BLD AUTO: 68.3 % (ref 37–80)
NITRITE UR QL STRIP: NEGATIVE
NRBC BLD MANUAL-RTO: 0 /100 WBC (ref 0–0)
PH UR STRIP.AUTO: 6.5 [PH] (ref 5–8)
PLATELET # BLD AUTO: 270 10*3/MM3 (ref 130–400)
PMV BLD AUTO: 11.2 FL (ref 6–12)
POTASSIUM BLD-SCNC: 4.1 MMOL/L (ref 3.5–5.1)
PROT SERPL-MCNC: 8.5 G/DL (ref 6.3–8.2)
PROT UR QL STRIP: NEGATIVE
RBC # BLD AUTO: 5.32 10*6/MM3 (ref 4.2–5.4)
SODIUM BLD-SCNC: 145 MMOL/L (ref 137–145)
SP GR UR STRIP: 1.02 (ref 1–1.03)
UROBILINOGEN UR QL STRIP: NORMAL
WBC NRBC COR # BLD: 7.18 10*3/MM3 (ref 4.8–10.8)

## 2017-10-22 PROCEDURE — 85025 COMPLETE CBC W/AUTO DIFF WBC: CPT | Performed by: EMERGENCY MEDICINE

## 2017-10-22 PROCEDURE — 81025 URINE PREGNANCY TEST: CPT | Performed by: EMERGENCY MEDICINE

## 2017-10-22 PROCEDURE — 80053 COMPREHEN METABOLIC PANEL: CPT | Performed by: EMERGENCY MEDICINE

## 2017-10-22 PROCEDURE — 81003 URINALYSIS AUTO W/O SCOPE: CPT | Performed by: EMERGENCY MEDICINE

## 2017-10-22 PROCEDURE — 76705 ECHO EXAM OF ABDOMEN: CPT

## 2017-10-22 RX ORDER — ONDANSETRON 4 MG/1
4 TABLET, ORALLY DISINTEGRATING ORAL ONCE
Status: COMPLETED | OUTPATIENT
Start: 2017-10-22 | End: 2017-10-22

## 2017-10-22 RX ORDER — DICYCLOMINE HYDROCHLORIDE 10 MG/1
10 CAPSULE ORAL 3 TIMES DAILY PRN
Qty: 21 CAPSULE | Refills: 0 | Status: SHIPPED | OUTPATIENT
Start: 2017-10-22 | End: 2018-07-24

## 2017-10-22 RX ADMIN — ONDANSETRON 4 MG: 4 TABLET, ORALLY DISINTEGRATING ORAL at 03:12

## 2017-10-22 NOTE — ED PROVIDER NOTES
Subjective   History of Present Illness   22F here w/ several months of abd pain worse in RUQ over last 3d, constant, dull w/ associated nausea and one episode of vomiting. Still tolerating PO. No fever/chills, diarrhea, vaginal symptoms. Seen by OB-Gyn 3d ago w/ negative TV-US.     Review of Systems   Gastrointestinal: Positive for abdominal pain and nausea.   All other systems reviewed and are negative.      Past Medical History:   Diagnosis Date   • Abnormal Pap smear of cervix 2015    ASCUS +HPV   • Anxiety and depression    • Body piercing     LOWER LIP, TOP OF LEFT EAR   • Constipation    • Depression    • GERD (gastroesophageal reflux disease)    • Hearing loss     REPORTS USES NO HEARING DEVICES   • History of blood transfusion 2016    REPORTS NO REACTION   • History of Papanicolaou smear of cervix 2016    NORMAL    • HPV in female    • Hypoglycemia    • Migraine    • Ovarian cyst 2017    RIGHT    • Tattoos     1 ON LEFT WRIST, BILATERAL SHOULDER BLADES, LEFT LEG       Allergies   Allergen Reactions   • Latex Hives       Past Surgical History:   Procedure Laterality Date   •  SECTION  2016    DR MARC SALINAS   • EAR TUBES         Family History   Problem Relation Age of Onset   • Cancer Other    • Diabetes Other    • Hypertension Other    • Hyperlipidemia Other    • Liver disease Other      aunt has liver disease from hep c   • Mental illness Other    • Thyroid disease Other    • Ulcers Mother        Social History     Social History   • Marital status:      Spouse name: N/A   • Number of children: N/A   • Years of education: N/A     Social History Main Topics   • Smoking status: Never Smoker   • Smokeless tobacco: Never Used   • Alcohol use No   • Drug use: No   • Sexual activity: Yes     Partners: Male     Other Topics Concern   • None     Social History Narrative           Objective   Physical Exam   Constitutional: She is oriented to person, place, and time. She appears  well-nourished. No distress.   HENT:   Head: Normocephalic.   Mouth/Throat: Oropharynx is clear and moist. No oropharyngeal exudate.   Eyes: Conjunctivae are normal. No scleral icterus.   Neck: Neck supple. No tracheal deviation present.   Cardiovascular: Normal rate, regular rhythm, normal heart sounds and intact distal pulses.  Exam reveals no gallop and no friction rub.    No murmur heard.  Pulmonary/Chest: Effort normal and breath sounds normal. No stridor. No respiratory distress. She has no wheezes. She has no rales.   Abdominal: Soft. She exhibits no distension and no mass. There is tenderness (mild epigastric). There is no rebound and no guarding. No hernia.   Musculoskeletal: She exhibits no edema or deformity.   Neurological: She is alert and oriented to person, place, and time.   Skin: Skin is warm and dry. She is not diaphoretic. No erythema. No pallor.   Psychiatric: She has a normal mood and affect. Her behavior is normal.   Nursing note and vitals reviewed.      Procedures         ED Course  ED Course                  MDM   22F here w/ chronic abd pain, n/v. AFVSS. Likely functional abd pain and unlikely acute surgical process. Given ttp in RUQ, some concern for biliary colic vs pyelonephritis vs other. Will get labs, US RUQ and reassess.       2:42 AM Work up unremarkable. Will have pt f/u w/ GI to complete her work up. Will start on bentyl. Discussed return to care precautions.       Final diagnoses:   Epigastric pain            Kojo Escalante MD  10/22/17 1332

## 2017-12-13 ENCOUNTER — OFFICE VISIT (OUTPATIENT)
Dept: OBSTETRICS AND GYNECOLOGY | Facility: CLINIC | Age: 22
End: 2017-12-13

## 2017-12-13 VITALS
HEIGHT: 59 IN | BODY MASS INDEX: 18.95 KG/M2 | WEIGHT: 94 LBS | DIASTOLIC BLOOD PRESSURE: 58 MMHG | SYSTOLIC BLOOD PRESSURE: 112 MMHG

## 2017-12-13 DIAGNOSIS — N94.6 DYSMENORRHEA: ICD-10-CM

## 2017-12-13 DIAGNOSIS — R10.2 PELVIC PAIN: Primary | ICD-10-CM

## 2017-12-13 DIAGNOSIS — N92.0 MENORRHAGIA WITH REGULAR CYCLE: ICD-10-CM

## 2017-12-13 DIAGNOSIS — N83.201 CYST OF RIGHT OVARY: ICD-10-CM

## 2017-12-13 LAB
ERYTHROCYTE [DISTWIDTH] IN BLOOD BY AUTOMATED COUNT: 14.7 % (ref 11.5–14.5)
HCT VFR BLD AUTO: 38.9 % (ref 37–47)
HGB BLD-MCNC: 11.8 G/DL (ref 12–16)
MCH RBC QN AUTO: 23.5 PG (ref 27–31)
MCHC RBC AUTO-ENTMCNC: 30.3 G/DL (ref 30–37)
MCV RBC AUTO: 77.5 FL (ref 81–99)
PLATELET # BLD AUTO: 240 10*3/MM3 (ref 130–400)
RBC # BLD AUTO: 5.02 10*6/MM3 (ref 4.2–5.4)
WBC # BLD AUTO: 5.74 10*3/MM3 (ref 4.8–10.8)

## 2017-12-13 PROCEDURE — 99214 OFFICE O/P EST MOD 30 MIN: CPT | Performed by: OBSTETRICS & GYNECOLOGY

## 2017-12-13 RX ORDER — LEVONORGESTREL AND ETHINYL ESTRADIOL 0.1-0.02MG
1 KIT ORAL DAILY
Qty: 28 TABLET | Refills: 12 | Status: SHIPPED | OUTPATIENT
Start: 2017-12-13 | End: 2018-07-24

## 2017-12-13 RX ORDER — PNV NO.95/FERROUS FUM/FOLIC AC 28MG-0.8MG
1 TABLET ORAL DAILY
Qty: 90 TABLET | Refills: 3 | Status: SHIPPED | OUTPATIENT
Start: 2017-12-13 | End: 2021-04-13

## 2017-12-13 NOTE — PROGRESS NOTES
Subjective  Chief Complaint   Patient presents with   • Follow-up     TRANSVAGINAL ULTRASOUND, PELVIC PAIN, RIGHT OVARIAN CYST     Patient is 22 y.o.  here for f/u of ovarian cyst and pelvic pain.  Pt had previously been seen with simple right ovarian cyst.  Pt had TVS on 10/19 showing 1.6 cm right simple follicular cyst.  Pt reports continued pelvic pain; intermittent; sometimes on left and other times on the right.  Pt went to the ER in the interim; records reviewed; had RUQ ultrasound which was normal.  Pt reports feeling extremely tired and fatigued.  Pt had to receive blood during pregnancy.  Pt concerned regarding blood levels; ?needs blood transfusion.  Pt is not on any contraception; does not want to get pregnancy.  Pt had last menses - and pt reports very heavy and painful.    History  Past Medical History:   Diagnosis Date   • Abnormal Pap smear of cervix 2015    ASCUS +HPV   • Anxiety and depression    • Body piercing     LOWER LIP, TOP OF LEFT EAR   • Constipation    • Depression    • GERD (gastroesophageal reflux disease)    • Hearing loss     REPORTS USES NO HEARING DEVICES   • History of blood transfusion 2016    REPORTS NO REACTION   • History of Papanicolaou smear of cervix 2016    NORMAL    • HPV in female    • Hypoglycemia    • Migraine    • Ovarian cyst 2017    RIGHT    • Tattoos     1 ON LEFT WRIST, BILATERAL SHOULDER BLADES, LEFT LEG     Current Outpatient Prescriptions on File Prior to Visit   Medication Sig Dispense Refill   • citalopram (CeleXA) 20 MG tablet TAKE 1 TABLET BY MOUTH DAILY  2   • dicyclomine (BENTYL) 10 MG capsule Take 1 capsule by mouth 3 (Three) Times a Day As Needed (abd pain). 21 capsule 0   • famotidine (PEPCID) 40 MG tablet Take 1 tablet by mouth Daily. 30 tablet 0   • gabapentin (NEURONTIN) 300 MG capsule TAKE 1 CAPSULE BY MOUTH EVERY NIGHT. 30 capsule 1   • pantoprazole (PROTONIX) 40 MG EC tablet Take 1 tablet by mouth Daily. 30 tablet 1     No  "current facility-administered medications on file prior to visit.      Allergies   Allergen Reactions   • Latex Hives     Past Surgical History:   Procedure Laterality Date   •  SECTION  2016    DR MARC SALINAS   • EAR TUBES       Family History   Problem Relation Age of Onset   • Cancer Other    • Diabetes Other    • Hypertension Other    • Hyperlipidemia Other    • Liver disease Other      aunt has liver disease from hep c   • Mental illness Other    • Thyroid disease Other    • Ulcers Mother      Social History     Social History   • Marital status:      Spouse name: N/A   • Number of children: N/A   • Years of education: N/A     Social History Main Topics   • Smoking status: Never Smoker   • Smokeless tobacco: Never Used   • Alcohol use No   • Drug use: No   • Sexual activity: Yes     Partners: Male     Other Topics Concern   • None     Social History Narrative     Review of Systems  All systems were reviewed and negative except for:  Genitourinary: postivie for  abnormal menstrual bleeding and pelvic pain     Objective  Vitals:    17 1147   BP: 112/58   Weight: 42.6 kg (94 lb)   Height: 149.9 cm (59\")     Physical Exam:  General Appearance: alert, appears stated age and cooperative  Head: normocephalic, without obvious abnormality and atraumatic  Eyes: lids and lashes normal, conjunctivae and sclerae normal, no icterus, no pallor, corneas clear and PERRLA  Ears: ears appear intact with no abnormalities noted  Nose: nares normal, septum midline, mucosa normal and no drainage  Neck: suppple, trachea midline and no thyromegaly  Lungs: clear to auscultation, respirations regular, respirations even and respirations unlabored  Heart: regular rhythm and normal rate, normal S1, S2, no murmur, gallop, or rubs and no click  Breasts: Not performed.  Abdomen: normal bowel sounds, no masses, no hepatomegaly, no splenomegaly, soft non-tender, no guarding and no rebound tenderness  Pelvic: Not " performed.  Extremities: moves extremities well, no edema, no cyanosis and no redness  Skin: no bleeding, bruising or rash and no lesions noted  Lymph Nodes: no palpable adenopathy  Psych: normal mood and affect, oriented to person, time and place, thought content organized and appropriate judgment    Lab Review   Office Visit on 12/13/2017   Component Date Value Ref Range Status   • WBC 12/13/2017 5.74  4.80 - 10.80 10*3/mm3 Final   • RBC 12/13/2017 5.02  4.20 - 5.40 10*6/mm3 Final   • Hemoglobin 12/13/2017 11.8* 12.0 - 16.0 g/dL Final   • Hematocrit 12/13/2017 38.9  37.0 - 47.0 % Final   • MCV 12/13/2017 77.5* 81.0 - 99.0 fL Final   • MCH 12/13/2017 23.5* 27.0 - 31.0 pg Final   • MCHC 12/13/2017 30.3  30.0 - 37.0 g/dL Final   • RDW 12/13/2017 14.7* 11.5 - 14.5 % Final   • Platelets 12/13/2017 240  130 - 400 10*3/mm3 Final   Admission on 10/22/2017, Discharged on 10/22/2017   Component Date Value Ref Range Status   • Glucose 10/22/2017 96  74 - 98 mg/dL Final   • BUN 10/22/2017 7  7 - 20 mg/dL Final   • Creatinine 10/22/2017 0.80  0.60 - 1.30 mg/dL Final   • Sodium 10/22/2017 145  137 - 145 mmol/L Final   • Potassium 10/22/2017 4.1  3.5 - 5.1 mmol/L Final   • Chloride 10/22/2017 103  98 - 107 mmol/L Final   • CO2 10/22/2017 26.0  26.0 - 30.0 mmol/L Final   • Calcium 10/22/2017 9.9  8.4 - 10.2 mg/dL Final   • Total Protein 10/22/2017 8.5* 6.3 - 8.2 g/dL Final   • Albumin 10/22/2017 4.90  3.50 - 5.00 g/dL Final   • ALT (SGPT) 10/22/2017 26  13 - 69 U/L Final   • AST (SGOT) 10/22/2017 25  15 - 46 U/L Final   • Alkaline Phosphatase 10/22/2017 56  38 - 126 U/L Final   • Total Bilirubin 10/22/2017 0.3  0.2 - 1.3 mg/dL Final   • eGFR Non African Amer 10/22/2017 89  >60 mL/min/1.73 Final   • Globulin 10/22/2017 3.6  gm/dL Final   • A/G Ratio 10/22/2017 1.4  1.0 - 2.0 g/dL Final   • BUN/Creatinine Ratio 10/22/2017 8.8  7.1 - 23.5 Final   • Anion Gap 10/22/2017 20.1  mmol/L Final   • Color, UA 10/22/2017 Yellow  Yellow,  Straw Final   • Appearance, UA 10/22/2017 Clear  Clear Final   • pH, UA 10/22/2017 6.5  5.0 - 8.0 Final   • Specific Gravity, UA 10/22/2017 1.020  1.005 - 1.030 Final   • Glucose, UA 10/22/2017 Negative  Negative Final   • Ketones, UA 10/22/2017 Negative  Negative Final   • Bilirubin, UA 10/22/2017 Negative  Negative Final   • Blood, UA 10/22/2017 Negative  Negative Final   • Protein, UA 10/22/2017 Negative  Negative Final   • Leuk Esterase, UA 10/22/2017 Negative  Negative Final   • Nitrite, UA 10/22/2017 Negative  Negative Final   • Urobilinogen, UA 10/22/2017 0.2 E.U./dL  0.2 - 1.0 E.U./dL Final   • HCG, Urine QL 10/22/2017 Negative  Negative Final   • WBC 10/22/2017 7.18  4.80 - 10.80 10*3/mm3 Final   • RBC 10/22/2017 5.32  4.20 - 5.40 10*6/mm3 Final   • Hemoglobin 10/22/2017 12.5  12.0 - 16.0 g/dL Final   • Hematocrit 10/22/2017 40.7  37.0 - 47.0 % Final   • MCV 10/22/2017 76.5* 81.0 - 99.0 fL Final   • MCH 10/22/2017 23.5* 27.0 - 31.0 pg Final   • MCHC 10/22/2017 30.7  30.0 - 37.0 g/dL Final   • RDW 10/22/2017 15.1* 11.5 - 14.5 % Final   • RDW-SD 10/22/2017 41.8  37.0 - 54.0 fl Final   • MPV 10/22/2017 11.2  6.0 - 12.0 fL Final   • Platelets 10/22/2017 270  130 - 400 10*3/mm3 Final   • Neutrophil % 10/22/2017 68.3  37.0 - 80.0 % Final   • Lymphocyte % 10/22/2017 24.7  10.0 - 50.0 % Final   • Monocyte % 10/22/2017 5.4  0.0 - 12.0 % Final   • Eosinophil % 10/22/2017 1.1  0.0 - 7.0 % Final   • Basophil % 10/22/2017 0.1  0.0 - 2.5 % Final   • Immature Grans % 10/22/2017 0.4  0.0 - 0.6 % Final   • Neutrophils, Absolute 10/22/2017 4.90  2.00 - 6.90 10*3/mm3 Final   • Lymphocytes, Absolute 10/22/2017 1.77  0.60 - 3.40 10*3/mm3 Final   • Monocytes, Absolute 10/22/2017 0.39  0.00 - 0.90 10*3/mm3 Final   • Eosinophils, Absolute 10/22/2017 0.08  0.00 - 0.70 10*3/mm3 Final   • Basophils, Absolute 10/22/2017 0.01  0.00 - 0.20 10*3/mm3 Final   • Immature Grans, Absolute 10/22/2017 0.03  0.00 - 0.06 10*3/mm3 Final   • nRBC  10/22/2017 0.0  0.0 - 0.0 /100 WBC Final         Imaging   Pelvic ultrasound images independantly reviewed - TVS today shows normal uterus with ET 11.75 mm; bilateral ovaries normal other than follicular cyst; largest 1.5 cm on the left; small amount of free fluid seen.  RUQ ultrasound report   Results for orders placed during the hospital encounter of 10/22/17   US Gallbladder    Narrative PROCEDURE: US GALLBLADDER-     HISTORY: RUQ abd pain     FINDINGS: The gallbladder is empty without stones or sludge. Small  gallbladder fold or septation incidentally identified. No gallbladder  wall thickening or pericholecystic fluid.  No biliary ductal dilatation.   Visualized portions of the hepatic and pancreatic parenchyma  demonstrate no focal abnormality.  Some portions are mildly obscured by  bowel gas. Increased echogenicty of the liver may be artifactual or due  to fatty infiltration.  Survey views of the right kidney show no  hydronephrosis.       Impression No cholelithiasis or sonographic evidence for acute  cholecystitis.         This report was finalized on 10/22/2017 7:21 AM by Austen Benton MD.           Assessment/Plan    Problem List Items Addressed This Visit        Genitourinary    Ovarian cyst New  Patient now with left ovarian cyst; follicular in nature only.  Plan trial with ocps.      Other Visit Diagnoses     Menorrhagia New  Various options discussed.  Plan trial with ocps as noted.  CBC today.  Dysmenorrhea New  See plan below  Pelvic pain    -  Primary  Patient with continued pelvic pain.  Scans reviewed as noted above.  Various options discussed with patient.  Recommend trail with ocps as patient needs contraception as well as for pelvic pain and menorrhagia.  Pt in agreement.  Rx given as noted.  Pt to call for labs.      Relevant Orders    CBC (No Diff) (Completed)            Follow up 3 months     This note was electronically signed.  Julissa Brian M.D.

## 2018-04-26 ENCOUNTER — HOSPITAL ENCOUNTER (EMERGENCY)
Facility: HOSPITAL | Age: 23
Discharge: HOME | End: 2018-04-26
Attending: EMERGENCY MEDICINE | Admitting: EMERGENCY MEDICINE

## 2018-04-26 VITALS
RESPIRATION RATE: 16 BRPM | WEIGHT: 93.6 LBS | SYSTOLIC BLOOD PRESSURE: 112 MMHG | TEMPERATURE: 98 F | DIASTOLIC BLOOD PRESSURE: 69 MMHG | HEART RATE: 88 BPM | HEIGHT: 59 IN | OXYGEN SATURATION: 97 % | BODY MASS INDEX: 18.87 KG/M2

## 2018-04-26 DIAGNOSIS — M54.50 LEFT-SIDED LOW BACK PAIN WITHOUT SCIATICA, UNSPECIFIED CHRONICITY: ICD-10-CM

## 2018-04-26 DIAGNOSIS — Z32.02 PREGNANCY TEST NEGATIVE: Primary | ICD-10-CM

## 2018-04-26 LAB
ANION GAP SERPL CALCULATED.3IONS-SCNC: 19.1 MMOL/L (ref 10–20)
B-HCG UR QL: NEGATIVE
BASOPHILS # BLD AUTO: 0.01 10*3/MM3 (ref 0–0.2)
BASOPHILS NFR BLD AUTO: 0.1 % (ref 0–2.5)
BILIRUB UR QL STRIP: NEGATIVE
BUN BLD-MCNC: 6 MG/DL (ref 7–20)
BUN/CREAT SERPL: 8.6 (ref 7.1–23.5)
CALCIUM SPEC-SCNC: 9.7 MG/DL (ref 8.4–10.2)
CHLORIDE SERPL-SCNC: 104 MMOL/L (ref 98–107)
CLARITY UR: ABNORMAL
CO2 SERPL-SCNC: 24 MMOL/L (ref 26–30)
COLOR UR: YELLOW
CREAT BLD-MCNC: 0.7 MG/DL (ref 0.6–1.3)
DEPRECATED RDW RBC AUTO: 37.7 FL (ref 37–54)
EOSINOPHIL # BLD AUTO: 0.06 10*3/MM3 (ref 0–0.7)
EOSINOPHIL NFR BLD AUTO: 0.8 % (ref 0–7)
ERYTHROCYTE [DISTWIDTH] IN BLOOD BY AUTOMATED COUNT: 13.2 % (ref 11.5–14.5)
GFR SERPL CREATININE-BSD FRML MDRD: 104 ML/MIN/1.73
GLUCOSE BLD-MCNC: 92 MG/DL (ref 74–98)
GLUCOSE UR STRIP-MCNC: NEGATIVE MG/DL
HCG SERPL QL: NEGATIVE
HCT VFR BLD AUTO: 43 % (ref 37–47)
HGB BLD-MCNC: 14.1 G/DL (ref 12–16)
HGB UR QL STRIP.AUTO: NEGATIVE
IMM GRANULOCYTES # BLD: 0.01 10*3/MM3 (ref 0–0.06)
IMM GRANULOCYTES NFR BLD: 0.1 % (ref 0–0.6)
KETONES UR QL STRIP: NEGATIVE
LEUKOCYTE ESTERASE UR QL STRIP.AUTO: NEGATIVE
LYMPHOCYTES # BLD AUTO: 1.62 10*3/MM3 (ref 0.6–3.4)
LYMPHOCYTES NFR BLD AUTO: 21 % (ref 10–50)
MCH RBC QN AUTO: 26.3 PG (ref 27–31)
MCHC RBC AUTO-ENTMCNC: 32.8 G/DL (ref 30–37)
MCV RBC AUTO: 80.2 FL (ref 81–99)
MONOCYTES # BLD AUTO: 0.38 10*3/MM3 (ref 0–0.9)
MONOCYTES NFR BLD AUTO: 4.9 % (ref 0–12)
NEUTROPHILS # BLD AUTO: 5.63 10*3/MM3 (ref 2–6.9)
NEUTROPHILS NFR BLD AUTO: 73.1 % (ref 37–80)
NITRITE UR QL STRIP: NEGATIVE
NRBC BLD MANUAL-RTO: 0 /100 WBC (ref 0–0)
PH UR STRIP.AUTO: 5.5 [PH] (ref 5–8)
PLATELET # BLD AUTO: 228 10*3/MM3 (ref 130–400)
PMV BLD AUTO: 10.6 FL (ref 6–12)
POTASSIUM BLD-SCNC: 4.1 MMOL/L (ref 3.5–5.1)
PROT UR QL STRIP: NEGATIVE
RBC # BLD AUTO: 5.36 10*6/MM3 (ref 4.2–5.4)
SODIUM BLD-SCNC: 143 MMOL/L (ref 137–145)
SP GR UR STRIP: 1.01 (ref 1–1.03)
UROBILINOGEN UR QL STRIP: ABNORMAL
WBC NRBC COR # BLD: 7.71 10*3/MM3 (ref 4.8–10.8)

## 2018-04-26 PROCEDURE — 84703 CHORIONIC GONADOTROPIN ASSAY: CPT | Performed by: PHYSICIAN ASSISTANT

## 2018-04-26 PROCEDURE — 80048 BASIC METABOLIC PNL TOTAL CA: CPT | Performed by: PHYSICIAN ASSISTANT

## 2018-04-26 PROCEDURE — 99283 EMERGENCY DEPT VISIT LOW MDM: CPT

## 2018-04-26 PROCEDURE — 96374 THER/PROPH/DIAG INJ IV PUSH: CPT

## 2018-04-26 PROCEDURE — 81003 URINALYSIS AUTO W/O SCOPE: CPT | Performed by: PHYSICIAN ASSISTANT

## 2018-04-26 PROCEDURE — 85025 COMPLETE CBC W/AUTO DIFF WBC: CPT | Performed by: PHYSICIAN ASSISTANT

## 2018-04-26 PROCEDURE — 81025 URINE PREGNANCY TEST: CPT | Performed by: PHYSICIAN ASSISTANT

## 2018-04-26 PROCEDURE — 25010000002 ONDANSETRON PER 1 MG: Performed by: EMERGENCY MEDICINE

## 2018-04-26 RX ORDER — ONDANSETRON 2 MG/ML
4 INJECTION INTRAMUSCULAR; INTRAVENOUS ONCE
Status: COMPLETED | OUTPATIENT
Start: 2018-04-26 | End: 2018-04-26

## 2018-04-26 RX ORDER — SODIUM CHLORIDE 0.9 % (FLUSH) 0.9 %
10 SYRINGE (ML) INJECTION AS NEEDED
Status: DISCONTINUED | OUTPATIENT
Start: 2018-04-26 | End: 2018-04-26 | Stop reason: HOSPADM

## 2018-04-26 RX ORDER — IBUPROFEN 400 MG/1
400 TABLET ORAL EVERY 8 HOURS PRN
Qty: 12 TABLET | Refills: 0 | Status: SHIPPED | OUTPATIENT
Start: 2018-04-26 | End: 2018-07-24

## 2018-04-26 RX ADMIN — SODIUM CHLORIDE 1000 ML: 9 INJECTION, SOLUTION INTRAVENOUS at 15:16

## 2018-04-26 RX ADMIN — ONDANSETRON 4 MG: 2 INJECTION INTRAMUSCULAR; INTRAVENOUS at 15:16

## 2018-06-20 ENCOUNTER — TRANSCRIBE ORDERS (OUTPATIENT)
Dept: ADMINISTRATIVE | Facility: HOSPITAL | Age: 23
End: 2018-06-20

## 2018-06-20 DIAGNOSIS — R41.3 MEMORY LOSS: Primary | ICD-10-CM

## 2018-06-28 ENCOUNTER — APPOINTMENT (OUTPATIENT)
Dept: MRI IMAGING | Facility: HOSPITAL | Age: 23
End: 2018-06-28

## 2018-07-24 ENCOUNTER — INITIAL PRENATAL (OUTPATIENT)
Dept: OBSTETRICS AND GYNECOLOGY | Facility: CLINIC | Age: 23
End: 2018-07-24

## 2018-07-24 VITALS — DIASTOLIC BLOOD PRESSURE: 60 MMHG | BODY MASS INDEX: 17.98 KG/M2 | SYSTOLIC BLOOD PRESSURE: 118 MMHG | WEIGHT: 89 LBS

## 2018-07-24 DIAGNOSIS — Z98.891 HX OF CESAREAN SECTION: ICD-10-CM

## 2018-07-24 DIAGNOSIS — Z34.81 ENCOUNTER FOR SUPERVISION OF OTHER NORMAL PREGNANCY IN FIRST TRIMESTER: Primary | ICD-10-CM

## 2018-07-24 DIAGNOSIS — O36.80X0 ENCOUNTER TO DETERMINE FETAL VIABILITY OF PREGNANCY, SINGLE OR UNSPECIFIED FETUS: ICD-10-CM

## 2018-07-24 LAB
C TRACH RRNA SPEC DONR QL NAA+PROBE: NEGATIVE
N GONORRHOEA DNA SPEC QL NAA+PROBE: NEGATIVE

## 2018-07-24 PROCEDURE — 99214 OFFICE O/P EST MOD 30 MIN: CPT | Performed by: MIDWIFE

## 2018-07-24 NOTE — PROGRESS NOTES
Subjective     Chief Complaint   Patient presents with   • Initial Prenatal Visit     LMP 18, 8w5d by scan today, pap due, c/o nausea and vomiting, wants tubal        Cortney Catia Pastor is a 22 y.o. .  Patient's last menstrual period was 2018..  She presents to be seen to initiate prenatal care with our practice. First pregnancy . 2nd pregnancy CSection, states she was losing fluid. Wants repeat CSection and tubal. She is having nausea throughout the  Day for several weeks. She does vomit some. She is trying to eat small frequent meals.      The following portions of the patient's history were reviewed and updated as appropriate:vital signs, allergies, current medications, past family history, past medical history, past social history, past surgical history and problem list.    Review of Systems -   /60   Wt 40.4 kg (89 lb)   LMP 2018   BMI 17.98 kg/m²   Gastrointestinal: Nausea and vomiting, denies constipation   Genitourinary: Frequency - denies urgency or burning with urination  All other systems reviewed and are negative    Objective     Physical Exam  Constitutional   The patient is alert, well developed & well nourished.   Neck   The neck is supple and the trachea is midline. The thyroid is not enlarged and there are no palpable nodules.   Breast   Inspection of the breast reveals symmetrical. The nipples are everted. No masses palpated  Respiratory  The patient is relaxed and breathes without effort. Lungs CTAB  Cardiovascular  Regular rate and rhythm without murmur -  Negative LE pitting edema  Gastrointestinal   The abdomen is soft and non tender. No hepatosplenomegaly  Genitourinary   - External Genitalia without erythema, lesions, or masses  -Vagina - There is no abnormal vaginal discharge.   -Cervix without discharge  Negative cervical motion tenderness   Uterus - uterine body size is approximate to dates  Adnexa structures are nontender and without  masses  Perineum is without inflammation or lesion  Skin  Normal color. No rashes or lesions. Tattoos  Extremities  Full ROM. No rashes or edema  Psychiatric  The patient is oriented to person, place, and time. Speech is fluent and words are clear    Imaging   Pelvic ultrasound report  8w5d, single IUP, +FHT      Assessment/Plan     ASSESSMENT  1. IUP at 8w5d   2. Low risk pregnancy  3. First trimester discomforts of pregnancy.   4. Previous CSection    PLAN  1. Tests ordered today:  Orders Placed This Encounter   Procedures   • US Ob Transvaginal     Order Specific Question:   Reason for Exam:     Answer:   NOB, dates   • OB Panel With HIV     2. Medications prescribed today:  No orders of the defined types were placed in this encounter.    3. Information reviewed: exercise in pregnancy, nutrition in pregnancy, weight gain in pregnancy, work and travel restrictions during pregnancy, list of OTC medications acceptable in pregnancy and call coverage groups  4. Genetic testing reviewed: Cystic Fibrosis Screen  5. Yoanna products, Vit B6 supp, Unisom, or seabands PRN      Follow up: 4 week(s)         This note was electronically signed.    Cindy Dorado CNM  7/24/2018

## 2018-07-25 LAB
ABO GROUP BLD: (no result)
BASOPHILS # BLD AUTO: 0 X10E3/UL (ref 0–0.2)
BASOPHILS NFR BLD AUTO: 0 %
BLD GP AB SCN SERPL QL: NEGATIVE
EOSINOPHIL # BLD AUTO: 0 X10E3/UL (ref 0–0.4)
EOSINOPHIL NFR BLD AUTO: 0 %
ERYTHROCYTE [DISTWIDTH] IN BLOOD BY AUTOMATED COUNT: 14.8 % (ref 12.3–15.4)
HBV SURFACE AG SERPL QL IA: NEGATIVE
HCT VFR BLD AUTO: 40.4 % (ref 34–46.6)
HCV AB S/CO SERPL IA: 0.1 S/CO RATIO (ref 0–0.9)
HGB BLD-MCNC: 13.5 G/DL (ref 11.1–15.9)
HIV 1+2 AB+HIV1 P24 AG SERPL QL IA: NON REACTIVE
IMM GRANULOCYTES # BLD: 0 X10E3/UL (ref 0–0.1)
IMM GRANULOCYTES NFR BLD: 0 %
LYMPHOCYTES # BLD AUTO: 1.2 X10E3/UL (ref 0.7–3.1)
LYMPHOCYTES NFR BLD AUTO: 11 %
MCH RBC QN AUTO: 26 PG (ref 26.6–33)
MCHC RBC AUTO-ENTMCNC: 33.4 G/DL (ref 31.5–35.7)
MCV RBC AUTO: 78 FL (ref 79–97)
MONOCYTES # BLD AUTO: 0.4 X10E3/UL (ref 0.1–0.9)
MONOCYTES NFR BLD AUTO: 4 %
NEUTROPHILS # BLD AUTO: 9 X10E3/UL (ref 1.4–7)
NEUTROPHILS NFR BLD AUTO: 85 %
PLATELET # BLD AUTO: 215 X10E3/UL (ref 150–379)
RBC # BLD AUTO: 5.2 X10E6/UL (ref 3.77–5.28)
RH BLD: POSITIVE
RPR SER QL: NON REACTIVE
RUBV IGG SERPL IA-ACNC: <0.9 INDEX
WBC # BLD AUTO: 10.6 X10E3/UL (ref 3.4–10.8)

## 2018-07-31 PROBLEM — Z28.39 RUBELLA NON-IMMUNE STATUS, ANTEPARTUM: Status: ACTIVE | Noted: 2018-07-24

## 2018-07-31 PROBLEM — O09.899 RUBELLA NON-IMMUNE STATUS, ANTEPARTUM: Status: ACTIVE | Noted: 2018-07-24

## 2018-08-03 DIAGNOSIS — Z34.81 ENCOUNTER FOR SUPERVISION OF OTHER NORMAL PREGNANCY IN FIRST TRIMESTER: ICD-10-CM

## 2018-08-18 ENCOUNTER — HOSPITAL ENCOUNTER (EMERGENCY)
Facility: HOSPITAL | Age: 23
Discharge: HOME | End: 2018-08-18
Attending: EMERGENCY MEDICINE | Admitting: EMERGENCY MEDICINE

## 2018-08-18 VITALS
RESPIRATION RATE: 16 BRPM | WEIGHT: 92.6 LBS | OXYGEN SATURATION: 100 % | BODY MASS INDEX: 18.67 KG/M2 | TEMPERATURE: 98.4 F | HEART RATE: 124 BPM | HEIGHT: 59 IN | SYSTOLIC BLOOD PRESSURE: 123 MMHG | DIASTOLIC BLOOD PRESSURE: 79 MMHG

## 2018-08-18 DIAGNOSIS — J06.9 UPPER RESPIRATORY TRACT INFECTION, UNSPECIFIED TYPE: Primary | ICD-10-CM

## 2018-08-18 DIAGNOSIS — R11.0 NAUSEA: ICD-10-CM

## 2018-08-18 PROCEDURE — 99283 EMERGENCY DEPT VISIT LOW MDM: CPT

## 2018-08-18 RX ORDER — ONDANSETRON 4 MG/1
4 TABLET, FILM COATED ORAL ONCE
Status: COMPLETED | OUTPATIENT
Start: 2018-08-18 | End: 2018-08-18

## 2018-08-18 RX ORDER — ONDANSETRON 4 MG/1
4 TABLET, FILM COATED ORAL EVERY 6 HOURS PRN
Qty: 12 TABLET | Refills: 0 | Status: SHIPPED | OUTPATIENT
Start: 2018-08-18 | End: 2018-11-20 | Stop reason: SDUPTHER

## 2018-08-18 RX ADMIN — ONDANSETRON 4 MG: 4 TABLET, FILM COATED ORAL at 21:51

## 2018-08-19 NOTE — ED PROVIDER NOTES
Subjective   22-year-old female presents with multiple complaints she states that she's had some nausea and she is 12 weeks pregnant and had morning sickness, she also states she had some aching in her legs.  She reports that she's had some pressure and nasal congestion.  Kids were  just recently diagnosed with head colds and were in the pediatrician's office the same symptoms over the last few days.  She states she's had symptoms for 2 days.  The morning sickness has been going on for weeks.  Keep down liquids but her appetite is good some days bad some days.        History provided by:  Patient   used: No        Review of Systems   HENT: Positive for congestion and sinus pain.    Gastrointestinal: Positive for nausea.   All other systems reviewed and are negative.      Past Medical History:   Diagnosis Date   • Abnormal Pap smear of cervix 2015    ASCUS +HPV   • Anxiety and depression    • Body piercing     LOWER LIP, TOP OF LEFT EAR   • Constipation    • Depression    • GERD (gastroesophageal reflux disease)    • Hearing loss     REPORTS USES NO HEARING DEVICES   • History of blood transfusion 2016    REPORTS NO REACTION   • History of Papanicolaou smear of cervix 2016    NORMAL    • HPV in female    • Hypoglycemia    • Migraine    • Ovarian cyst 2017    RIGHT    • Rubella non-immune status, antepartum 2018   • Tattoos     1 ON LEFT WRIST, BILATERAL SHOULDER BLADES, LEFT LEG       Allergies   Allergen Reactions   • Latex Hives       Past Surgical History:   Procedure Laterality Date   •  SECTION  2016    DR MARC SALINAS   • EAR TUBES         Family History   Problem Relation Age of Onset   • Cancer Other    • Diabetes Other    • Hypertension Other    • Hyperlipidemia Other    • Liver disease Other         aunt has liver disease from hep c   • Mental illness Other    • Thyroid disease Other    • Ulcers Mother        Social History     Social History   • Marital  status:      Social History Main Topics   • Smoking status: Never Smoker   • Smokeless tobacco: Never Used   • Alcohol use No   • Drug use: No   • Sexual activity: Yes     Partners: Male     Other Topics Concern   • Not on file           Objective   Physical Exam   Constitutional: She is oriented to person, place, and time. She appears well-developed and well-nourished.   HENT:   Head: Normocephalic.   Right Ear: External ear normal.   Left Ear: External ear normal.   Mouth/Throat: Oropharynx is clear and moist.   Eyes: EOM are normal.   Neck: Normal range of motion. Neck supple.   Cardiovascular: Normal rate and regular rhythm.    Pulmonary/Chest: Effort normal and breath sounds normal.   Abdominal: Soft. Bowel sounds are normal.   Musculoskeletal: Normal range of motion.   Neurological: She is alert and oriented to person, place, and time. She has normal reflexes.   Skin: Skin is warm and dry.   Psychiatric: She has a normal mood and affect.   Nursing note and vitals reviewed.      Procedures           ED Course                  MDM      Final diagnoses:   Upper respiratory tract infection, unspecified type   Nausea            Mark Jasmine Jr., PA-EARLINE  08/18/18 0540

## 2018-08-21 ENCOUNTER — ROUTINE PRENATAL (OUTPATIENT)
Dept: OBSTETRICS AND GYNECOLOGY | Facility: CLINIC | Age: 23
End: 2018-08-21

## 2018-08-21 VITALS — DIASTOLIC BLOOD PRESSURE: 60 MMHG | WEIGHT: 92 LBS | BODY MASS INDEX: 18.58 KG/M2 | SYSTOLIC BLOOD PRESSURE: 110 MMHG

## 2018-08-21 DIAGNOSIS — K59.00 CONSTIPATION, UNSPECIFIED CONSTIPATION TYPE: ICD-10-CM

## 2018-08-21 DIAGNOSIS — R09.89 CHEST CONGESTION: ICD-10-CM

## 2018-08-21 DIAGNOSIS — O21.9 NAUSEA AND VOMITING DURING PREGNANCY: ICD-10-CM

## 2018-08-21 DIAGNOSIS — Z34.92 PRENATAL CARE IN SECOND TRIMESTER: Primary | ICD-10-CM

## 2018-08-21 PROCEDURE — 99214 OFFICE O/P EST MOD 30 MIN: CPT | Performed by: OBSTETRICS & GYNECOLOGY

## 2018-08-21 RX ORDER — GUAIFENESIN 600 MG/1
600 TABLET, EXTENDED RELEASE ORAL 2 TIMES DAILY
Qty: 30 TABLET | Refills: 0 | Status: SHIPPED | OUTPATIENT
Start: 2018-08-21 | End: 2019-01-08

## 2018-08-21 RX ORDER — DIPHENHYDRAMINE HYDROCHLORIDE 25 MG/1
25 CAPSULE ORAL
Qty: 30 TABLET | Refills: 5 | Status: SHIPPED | OUTPATIENT
Start: 2018-08-21 | End: 2021-04-13

## 2018-08-22 NOTE — PROGRESS NOTES
Chief Complaint   Patient presents with   • Routine Prenatal Visit     pt stated when she was wiping this morning there was some pinkish color on toilet paper       HPI:   Cortney is a  currently at 12w5d who today reports the following:  Contractions - No; Leaking - No; Vaginal bleeding -  No; Swelling of extremities - No. Good fetal movement - N/A.    Patient reports chest congestion, nasal congestion, cough, fatigue.  She denies fevers/chills at home.    ROS:  GI: Nausea - YES; Constipation - No; Diarrhea - YES. RUQ pain - No    Neuro: Headache - No; Visual disturbances - No.    EXAM:  /60   Wt 41.7 kg (92 lb)   LMP 2018   BMI 18.58 kg/m²     Gen: NAD  Pulm: No use of accessory muscles, normal respirations  Abdomen: Gravid, nontender, size = dates, + fetal cardiac activity  Ext: no edema, no rashes, WWP  Gait: normal for pregnancy  Psych: Mood, insight, judgement intact  SVE: Not performed     Lab Results   Component Value Date    ABORH B Rh Positive 2014    ABO B 2018    RH Positive 2018    ABSCRN Negative 2018       Smoking status: Never Smoker                                                              Smokeless tobacco: Never Used                          MDM:  Impression: Supervision of low risk pregnancy  Previous C/S with planned repeat C/S  Nausea in pregnancy   URI  Diarrhea in pregnancy  Previous child with genetic syndrome   Tests/Orders today: Orders Placed This Encounter   Procedures   • InformaSeq Prenatal With / X&Y Analysis     Order Specific Question:   [facility] Date of last menstrual period or estimated date of delivery (corresponding to calculation method):     Answer:   2019     Order Specific Question:   [facility] Gestational age calculation method:     Answer:   ELYSSA,EDC   Metamucil daily  Mucinex prn  B6/Unisom for nausea   Topics discussed: URI   Nausea  Diarrhea and bowel regimen  Repeat C/S and BTL  Genetic testing   Tests next visit: none    Next visit: 4 weeks     Juan Meza MD  Obstetrics and Gynecology  [facility]

## 2018-08-27 ENCOUNTER — ROUTINE PRENATAL (OUTPATIENT)
Dept: OBSTETRICS AND GYNECOLOGY | Facility: CLINIC | Age: 23
End: 2018-08-27

## 2018-08-27 VITALS — DIASTOLIC BLOOD PRESSURE: 64 MMHG | WEIGHT: 93 LBS | SYSTOLIC BLOOD PRESSURE: 108 MMHG | BODY MASS INDEX: 18.78 KG/M2

## 2018-08-27 DIAGNOSIS — N76.0 VULVOVAGINITIS: ICD-10-CM

## 2018-08-27 DIAGNOSIS — Z34.91 PRENATAL CARE IN FIRST TRIMESTER: Primary | ICD-10-CM

## 2018-08-27 DIAGNOSIS — Z98.891 HX OF CESAREAN SECTION: ICD-10-CM

## 2018-08-27 PROCEDURE — 99214 OFFICE O/P EST MOD 30 MIN: CPT | Performed by: OBSTETRICS & GYNECOLOGY

## 2018-08-27 RX ORDER — HYDROXYZINE HYDROCHLORIDE 25 MG/1
25 TABLET, FILM COATED ORAL NIGHTLY PRN
Qty: 20 TABLET | Refills: 0 | Status: SHIPPED | OUTPATIENT
Start: 2018-08-27 | End: 2019-02-05

## 2018-08-28 NOTE — PROGRESS NOTES
Chief Complaint   Patient presents with   • Pregnancy Problem     VAGINAL DISCHARGE WITH  ITCHING AND BURNING SINCE THURSDAY.       HPI:   Cortney is a  currently at 13w5d who today reports the following:  Contractions - No; Leaking - No; Vaginal bleeding -  No; Swelling of extremities - No. Good fetal movement - YES.    She reports vaginal discharge with intense itching and burning since Thursday.  No odor that she can appreciate.    ROS:  GI: Nausea - No; Constipation - No; Diarrhea - No. RUQ pain - No    Neuro: Headache - No; Visual disturbances - No.    EXAM:  /64   Wt 42.2 kg (93 lb)   LMP 2018   BMI 18.78 kg/m²     Gen: NAD  Pulm: No use of accessory muscles, normal respirations  Abdomen: Gravid, nontender, size = dates, + fetal cardiac activity  Ext: no edema, no rashes, WWP  Gait: normal for pregnancy  Psych: Mood, insight, judgement intact  SSE: Thick white discharge coding vaginal vault walls, normal-appearing cervix, erythematous and irritated vulva    Lab Results   Component Value Date    ABORH B Rh Positive 2014    ABO B 2018    RH Positive 2018    ABSCRN Negative 2018       Smoking status: Never Smoker                                                              Smokeless tobacco: Never Used                          MDM:  Impression: Supervision of low risk pregnancy  Previous C/S with planned repeat C/S  Vulvovaginal candida   Tests/Orders today: Orders Placed This Encounter   Procedures   • NuSwab VG+ - Swab, Vagina   • NuSwab VG+ - Swab, Vagina   Rx for Terconazole cream + Atarax    Topics discussed: PIH precautions   labor signs and symptoms  Vulvovaginal candida - exam consistent with diagnosis, swabs collected, medications given as above.  She'll notify our clinic if she does not experience improvement.   Tests next visit: none   Next visit: 4 weeks     Juan Meza MD  Obstetrics and Gynecology  [facility]

## 2018-08-30 LAB
A VAGINAE DNA VAG QL NAA+PROBE: ABNORMAL SCORE
BVAB2 DNA VAG QL NAA+PROBE: ABNORMAL SCORE
C ALBICANS DNA VAG QL NAA+PROBE: POSITIVE
C GLABRATA DNA VAG QL NAA+PROBE: POSITIVE
C TRACH RRNA SPEC QL NAA+PROBE: NEGATIVE
MEGA1 DNA VAG QL NAA+PROBE: ABNORMAL SCORE
N GONORRHOEA RRNA SPEC QL NAA+PROBE: NEGATIVE
T VAGINALIS RRNA SPEC QL NAA+PROBE: NEGATIVE

## 2018-08-31 ENCOUNTER — TELEPHONE (OUTPATIENT)
Dept: OBSTETRICS AND GYNECOLOGY | Facility: CLINIC | Age: 23
End: 2018-08-31

## 2018-09-18 ENCOUNTER — ROUTINE PRENATAL (OUTPATIENT)
Dept: OBSTETRICS AND GYNECOLOGY | Facility: CLINIC | Age: 23
End: 2018-09-18

## 2018-09-18 VITALS — SYSTOLIC BLOOD PRESSURE: 104 MMHG | WEIGHT: 97 LBS | DIASTOLIC BLOOD PRESSURE: 62 MMHG | BODY MASS INDEX: 19.59 KG/M2

## 2018-09-18 DIAGNOSIS — B37.31 VULVOVAGINAL CANDIDIASIS: ICD-10-CM

## 2018-09-18 DIAGNOSIS — J06.9 VIRAL UPPER RESPIRATORY TRACT INFECTION: ICD-10-CM

## 2018-09-18 DIAGNOSIS — Z34.92 PRENATAL CARE IN SECOND TRIMESTER: Primary | ICD-10-CM

## 2018-09-18 DIAGNOSIS — R30.0 DYSURIA DURING PREGNANCY IN SECOND TRIMESTER: ICD-10-CM

## 2018-09-18 DIAGNOSIS — O26.892 DYSURIA DURING PREGNANCY IN SECOND TRIMESTER: ICD-10-CM

## 2018-09-18 DIAGNOSIS — Z98.891 HX OF CESAREAN SECTION: ICD-10-CM

## 2018-09-18 PROCEDURE — 99214 OFFICE O/P EST MOD 30 MIN: CPT | Performed by: OBSTETRICS & GYNECOLOGY

## 2018-09-18 NOTE — PROGRESS NOTES
Chief Complaint   Patient presents with   • Routine Prenatal Visit     c/o cough and congestion       HPI:   Cortney is a  currently at 16w5d who today reports the following:  Contractions - No; Leaking - No; Vaginal bleeding -  No; Swelling of extremities - No. Good fetal movement - No.    + Dysuria, + urinary frequency    ROS:  GI: Nausea - No; Constipation - No; Diarrhea - No. RUQ pain - No    Neuro: Headache - No; Visual disturbances - No.    Pertinent items are noted in HPI, all other systems reviewed and negative    Review of History:  The following portions of the patient's history were reviewed and updated as appropriate:problem list, current medications, allergies, past family history, past medical history, past social history and past surgical history.    EXAM:  /62   Wt 44 kg (97 lb)   LMP 2018   BMI 19.59 kg/m²     Gen: NAD, conversant  Pulm: No use of accessory muscles, normal respirations  Abdomen: Gravid, nontender, size = dates, + fetal cardiac activity  Ext: no edema, no rashes, WWP  Gait: normal for pregnancy  Psych: Mood, insight, judgement intact  SVE: Not performed     Lab Results   Component Value Date    ABORH B Rh Positive 2014    ABO B 2018    RH Positive 2018    ABSCRN Negative 2018       Smoking status: Never Smoker                                                              Smokeless tobacco: Never Used                          I have reviewed the prenatal labs and previous ultrasounds today.    MDM:  Diagnosis: Supervision of low risk pregnancy  Previous C/S with planned repeat C/S  URI   Vulvovaginal candidiasis  Dysuria   Tests/Orders today: Orders Placed This Encounter   Procedures   • Urine Culture - Urine, Urine, Clean Catch   • Urinalysis With Microscopic - Urine, Clean Catch     Rx for Terconazole cream x 7 days     Topics discussed: URI - Supportive measures   Candida - treatment as above, complete 7 days of therapy   Tests next  visit: U/S for anatomic screening   Next visit: 2 week(s)     Juan Meza MD  Obstetrics and Gynecology  [facility]

## 2018-09-20 LAB
APPEARANCE UR: CLEAR
BACTERIA #/AREA URNS HPF: ABNORMAL /HPF
BACTERIA UR CULT: NORMAL
BACTERIA UR CULT: NORMAL
BILIRUB UR QL STRIP: NEGATIVE
COLOR UR: YELLOW
EPI CELLS #/AREA URNS HPF: ABNORMAL /HPF
GLUCOSE UR QL: NEGATIVE
HGB UR QL STRIP: (no result)
KETONES UR QL STRIP: NEGATIVE
LEUKOCYTE ESTERASE UR QL STRIP: (no result)
NITRITE UR QL STRIP: NEGATIVE
PH UR STRIP: 7 [PH] (ref 5–8)
PROT UR QL STRIP: NEGATIVE
RBC #/AREA URNS HPF: ABNORMAL /HPF
SP GR UR: 1.01 (ref 1–1.03)
UROBILINOGEN UR STRIP-MCNC: (no result) MG/DL
WBC #/AREA URNS HPF: ABNORMAL /HPF

## 2018-10-02 ENCOUNTER — ROUTINE PRENATAL (OUTPATIENT)
Dept: OBSTETRICS AND GYNECOLOGY | Facility: CLINIC | Age: 23
End: 2018-10-02

## 2018-10-02 VITALS — WEIGHT: 97 LBS | BODY MASS INDEX: 19.59 KG/M2 | DIASTOLIC BLOOD PRESSURE: 64 MMHG | SYSTOLIC BLOOD PRESSURE: 102 MMHG

## 2018-10-02 DIAGNOSIS — Z34.92 PRENATAL CARE IN SECOND TRIMESTER: Primary | ICD-10-CM

## 2018-10-02 DIAGNOSIS — Z98.891 HX OF CESAREAN SECTION: ICD-10-CM

## 2018-10-02 PROCEDURE — 99213 OFFICE O/P EST LOW 20 MIN: CPT | Performed by: OBSTETRICS & GYNECOLOGY

## 2018-10-02 NOTE — PROGRESS NOTES
Chief Complaint   Patient presents with   • Pregnancy Ultrasound     Anatomy scan done today, no complaints       HPI:   Cortney is a  currently at 18w5d who today reports the following:  Contractions - No; Leaking - No; Vaginal bleeding -  No; Swelling of extremities - No. Good fetal movement - YES.    ROS:  GI: Nausea - No; Constipation - No; Diarrhea - No. RUQ pain - No    Neuro: Headache - No; Visual disturbances - No.    Pertinent items are noted in HPI, all other systems reviewed and negative    Review of History:  The following portions of the patient's history were reviewed and updated as appropriate:problem list, current medications, allergies, past family history, past medical history, past social history and past surgical history.    Current Outpatient Prescriptions on File Prior to Visit   Medication Sig Dispense Refill   • Prenatal Vit-Fe Fumarate-FA (PRENATAL VITAMINS) 28-0.8 MG tablet Take 1 tablet by mouth Daily. 90 tablet 3   • doxylamine (UNISOM) 25 MG tablet Take 1 tablet by mouth Every Night. 30 tablet 5   • guaiFENesin (MUCINEX) 600 MG 12 hr tablet Take 1 tablet by mouth 2 (Two) Times a Day. 30 tablet 0   • hydrOXYzine (ATARAX) 25 MG tablet Take 1 tablet by mouth At Night As Needed for Itching. 20 tablet 0   • ondansetron (ZOFRAN) 4 MG tablet Take 1 tablet by mouth Every 6 (Six) Hours As Needed for Nausea or Vomiting. 12 tablet 0   • psyllium (METAMUCIL SMOOTH TEXTURE) 28 % packet Take 1 packet by mouth Daily. 30 each 11   • vitamin B-6 (PYRIDOXINE) 25 MG tablet Take 1 tablet by mouth every night at bedtime. 30 tablet 5     No current facility-administered medications on file prior to visit.        EXAM:  /64   Wt 44 kg (97 lb)   LMP 2018   BMI 19.59 kg/m²     Gen: NAD, conversant  Pulm: No use of accessory muscles, normal respirations  Abdomen: Gravid, nontender, size = dates, + fetal cardiac activity  Ext: no edema, no rashes, WWP  Gait: normal for pregnancy  Psych: Mood,  insight, judgement intact  SVE: Not performed     Lab Results   Component Value Date    ABORH B Rh Positive 11/21/2014    ABO B 07/24/2018    RH Positive 07/24/2018    ABSCRN Negative 07/24/2018       Smoking status: Never Smoker                                                              Smokeless tobacco: Never Used                          I have reviewed the prenatal labs and previous ultrasounds today.    MDM:  Diagnosis: Supervision of low risk pregnancy  Previous C/S with planned repeat C/S   Desires sterility   Tests/Orders/Rx today: An anatomy ultrasound was ordered and independently reviewed today. IUP at 18+5 weeks. Anatomy was completely cleared today and all organ systems were found to be normal in appearance. EFW 262g(54%). Transverse presentation. Placenta is fundal. Amniotic fluid and fetal heart rate are normal.  Meds Ordered: None   Topics discussed: rLTCS + BTL at 39 weeks   Tests next visit: none   Next visit: 4 week(s)     Juan Meza MD  Obstetrics and Gynecology  [facility]

## 2018-10-22 ENCOUNTER — HOSPITAL ENCOUNTER (OUTPATIENT)
Facility: HOSPITAL | Age: 23
Discharge: HOME | End: 2018-10-22
Attending: MIDWIFE | Admitting: MIDWIFE

## 2018-10-22 VITALS
OXYGEN SATURATION: 100 % | SYSTOLIC BLOOD PRESSURE: 117 MMHG | HEIGHT: 59 IN | DIASTOLIC BLOOD PRESSURE: 73 MMHG | HEART RATE: 107 BPM | RESPIRATION RATE: 16 BRPM | TEMPERATURE: 98.6 F | BODY MASS INDEX: 20.24 KG/M2 | WEIGHT: 100.38 LBS

## 2018-10-22 LAB
BILIRUB BLD-MCNC: NEGATIVE MG/DL
CLARITY, POC: CLEAR
COLOR UR: YELLOW
GLUCOSE UR STRIP-MCNC: NEGATIVE MG/DL
KETONES UR QL: NEGATIVE
LEUKOCYTE EST, POC: ABNORMAL
NITRITE UR-MCNC: NEGATIVE MG/ML
PH UR: 8 [PH] (ref 5–8)
PROT UR STRIP-MCNC: ABNORMAL MG/DL
RBC # UR STRIP: NEGATIVE /UL
SP GR UR: 1 (ref 1–1.03)
UROBILINOGEN UR QL: NORMAL

## 2018-10-22 PROCEDURE — G0463 HOSPITAL OUTPT CLINIC VISIT: HCPCS

## 2018-10-22 PROCEDURE — 81002 URINALYSIS NONAUTO W/O SCOPE: CPT | Performed by: MIDWIFE

## 2018-10-22 RX ORDER — ONDANSETRON 4 MG/1
4 TABLET, ORALLY DISINTEGRATING ORAL ONCE
Status: COMPLETED | OUTPATIENT
Start: 2018-10-22 | End: 2018-10-22

## 2018-10-22 RX ADMIN — ONDANSETRON 4 MG: 4 TABLET, ORALLY DISINTEGRATING ORAL at 17:33

## 2018-10-22 NOTE — NURSING NOTE
Pt states Not as nauseated after the Zofran but her stomach hurts no contractions no diarrhea since admissions and ate a whole cup of ice chips

## 2018-10-22 NOTE — NURSING NOTE
Pt denies any vomiting and diarrhea and wants to go home EMILY MILLAN notified new orders to discharge home and a Rx will be called into [facility] for Zofran THI given pt VU

## 2018-10-22 NOTE — NURSING NOTE
23 year old white female  at 21 4/7 weeks gestation brought to  via wheelchair complaining of N/V/D since 11:00 am has had 2 watery bowel movements. FHT's 131 See urine dip results

## 2018-10-23 NOTE — H&P
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  Her symptoms started at 1100 this am and she hasn't been able to keep anything down. Nobody else in the house has been sick. She states she is also having mild abdominal pain. She denies any vaginal bleeding or leakage of fluid. She denies fever. She is feeling some fetal movement.    She has not been recently examined.        Obstetric History       T2      L2     SAB0   TAB0   Ectopic0   Molar0   Multiple0   Live Births2       # Outcome Date GA Lbr Flex/2nd Weight Sex Delivery Anes PTL Lv   3 Current            2 Term 2016 38w0d  3175 g (7 lb) F CS-LTranv   MUKESH      Complications: History of blood loss   1 Term 02/01/15   3175 g (7 lb) F Vag-Forceps EPI  MUKESH        Past Medical History:   Diagnosis Date   • Abnormal Pap smear of cervix 2015    ASCUS +HPV   • Anxiety and depression    • Body piercing     LOWER LIP, TOP OF LEFT EAR   • Constipation    • Depression    • GERD (gastroesophageal reflux disease)    • Hearing loss     REPORTS USES NO HEARING DEVICES   • History of blood transfusion 2016    REPORTS NO REACTION   • History of Papanicolaou smear of cervix 2016    NORMAL    • HPV in female    • Hypoglycemia    • Migraine    • Ovarian cyst 2017    RIGHT    • Rubella non-immune status, antepartum 2018   • Tattoos     1 ON LEFT WRIST, BILATERAL SHOULDER BLADES, LEFT LEG     Past Surgical History:   Procedure Laterality Date   •  SECTION  2016    DR MARC SALINAS   • EAR TUBES       No current facility-administered medications for this encounter.     Current Outpatient Prescriptions:   •  doxylamine (UNISOM) 25 MG tablet, Take 1 tablet by mouth Every Night., Disp: 30 tablet, Rfl: 5  •  guaiFENesin (MUCINEX) 600 MG  "12 hr tablet, Take 1 tablet by mouth 2 (Two) Times a Day., Disp: 30 tablet, Rfl: 0  •  hydrOXYzine (ATARAX) 25 MG tablet, Take 1 tablet by mouth At Night As Needed for Itching., Disp: 20 tablet, Rfl: 0  •  ondansetron (ZOFRAN) 4 MG tablet, Take 1 tablet by mouth Every 6 (Six) Hours As Needed for Nausea or Vomiting., Disp: 12 tablet, Rfl: 0  •  Prenatal Vit-Fe Fumarate-FA (PRENATAL VITAMINS) 28-0.8 MG tablet, Take 1 tablet by mouth Daily., Disp: 90 tablet, Rfl: 3  •  psyllium (METAMUCIL SMOOTH TEXTURE) 28 % packet, Take 1 packet by mouth Daily., Disp: 30 each, Rfl: 11  •  vitamin B-6 (PYRIDOXINE) 25 MG tablet, Take 1 tablet by mouth every night at bedtime., Disp: 30 tablet, Rfl: 5    Allergies   Allergen Reactions   • Latex Hives     Smoking status: Never Smoker                                                              Smokeless tobacco: Never Used                          Review of Systems     Respiratory ROS: no cough, shortness of breath, or wheezing  Cardiovascular ROS: no chest pain or dyspnea on exertion  Gastrointestinal ROS: positive for - abdominal pain, diarrhea and nausea/vomiting  Genito-Urinary ROS: no dysuria, trouble voiding, or hematuria        Objective   /73   Pulse 107   Temp 98.6 °F (37 °C) (Oral)   Resp 16   Ht 149.9 cm (59\")   Wt 45.5 kg (100 lb 6 oz)   LMP 05/28/2018   SpO2 100%   BMI 20.27 kg/m²   General: well developed; well nourished  no acute distress   Abdomen: soft, non-tender; no masses  gravid   FHT's: reassuring and appropriate for gestational age      Cervix: was not checked.   Presentation: undetermined   Contractions: none - external monitors used   Back: Not performed today     Prenatal Labs  Lab Results   Component Value Date    HGB 13.5 07/24/2018    HEPBSAG Negative 07/24/2018    ABORH B Rh Positive 11/21/2014    ABSCRN Negative 07/24/2018    [id number] Non Reactive 07/24/2018    HEPCVIRUSABY 0.1 07/24/2018    URINECX Final report 09/18/2018       Current Labs " Reviewed   UA negative ketones         Assessment   1. IUP at 21w5d  2. gastroenteritis           Plan   1. Zofran ODT now     2. Clear liquids, she was able to tolerate and was sent home with Rx for Zofran sent to pharmacy  3. Keep scheduled followup    Cindy Dorado CNM  10/23/2018  8:25 AM

## 2018-10-24 ENCOUNTER — ROUTINE PRENATAL (OUTPATIENT)
Dept: OBSTETRICS AND GYNECOLOGY | Facility: CLINIC | Age: 23
End: 2018-10-24

## 2018-10-24 VITALS — DIASTOLIC BLOOD PRESSURE: 64 MMHG | SYSTOLIC BLOOD PRESSURE: 108 MMHG | BODY MASS INDEX: 21.01 KG/M2 | WEIGHT: 104 LBS

## 2018-10-24 DIAGNOSIS — Z36.86 ENCOUNTER FOR ANTENATAL SCREENING FOR CERVICAL LENGTH: ICD-10-CM

## 2018-10-24 DIAGNOSIS — Z98.891 HX OF CESAREAN SECTION: ICD-10-CM

## 2018-10-24 DIAGNOSIS — Z34.92 PRENATAL CARE IN SECOND TRIMESTER: Primary | ICD-10-CM

## 2018-10-24 PROCEDURE — 99213 OFFICE O/P EST LOW 20 MIN: CPT | Performed by: OBSTETRICS & GYNECOLOGY

## 2018-10-24 NOTE — PROGRESS NOTES
Chief Complaint   Patient presents with   • Pregnancy Problem     c/o abnormal discharge and cramping        HPI:   Cortney is a  currently at 21w6d who today reports the following:  Contractions - YES - but less than 4/hour AND no associated change in vaginal discharge; Leaking - No; Vaginal bleeding -  No; Swelling of extremities - No. Good fetal movement - YES.    She reports worsening pelvic pressure and cramping.    ROS:  GI: Nausea - No; Constipation - No; Diarrhea - No. RUQ pain - No    Neuro: Headache - No; Visual disturbances - No.    Pertinent items are noted in HPI, all other systems reviewed and negative    Review of History:  The following portions of the patient's history were reviewed and updated as appropriate:problem list, current medications, allergies, past family history, past medical history, past social history and past surgical history.    Current Outpatient Prescriptions on File Prior to Visit   Medication Sig Dispense Refill   • doxylamine (UNISOM) 25 MG tablet Take 1 tablet by mouth Every Night. 30 tablet 5   • guaiFENesin (MUCINEX) 600 MG 12 hr tablet Take 1 tablet by mouth 2 (Two) Times a Day. 30 tablet 0   • hydrOXYzine (ATARAX) 25 MG tablet Take 1 tablet by mouth At Night As Needed for Itching. 20 tablet 0   • ondansetron (ZOFRAN) 4 MG tablet Take 1 tablet by mouth Every 6 (Six) Hours As Needed for Nausea or Vomiting. 12 tablet 0   • Prenatal Vit-Fe Fumarate-FA (PRENATAL VITAMINS) 28-0.8 MG tablet Take 1 tablet by mouth Daily. 90 tablet 3   • psyllium (METAMUCIL SMOOTH TEXTURE) 28 % packet Take 1 packet by mouth Daily. 30 each 11   • vitamin B-6 (PYRIDOXINE) 25 MG tablet Take 1 tablet by mouth every night at bedtime. 30 tablet 5     No current facility-administered medications on file prior to visit.        EXAM:  /64   Wt 47.2 kg (104 lb)   LMP 2018   BMI 21.01 kg/m²     Gen: NAD, conversant  Pulm: No use of accessory muscles, normal respirations  Abdomen: Gravid,  nontender, size = dates, + fetal cardiac activity  Ext: no edema, no rashes, WWP  Gait: normal for pregnancy  Psych: Mood, insight, judgement intact  SVE: 0/0/-3    Lab Results   Component Value Date    ABORH B Rh Positive 2014    ABO B 2018    RH Positive 2018    ABSCRN Negative 2018       Smoking status: Never Smoker                                                              Smokeless tobacco: Never Used                          I have reviewed the prenatal labs and previous ultrasounds today.    MDM:  Diagnosis: Supervision of low risk pregnancy  Previous C/S with planned repeat C/S  Desires sterility   Pelvic cramping / contractions   Tests/Orders/Rx today: Orders Placed This Encounter   Procedures   • US Ob Transvaginal     Order Specific Question:   Reason for Exam:     Answer:   cervical length     Cervix is normal in appearance.  All cervical lengths measure > 4 cm.    Meds Ordered: none   Topics discussed: none - she had no major complaints,questions or concerns  PIH precautions   labor signs and symptoms  rLTCS + BTL at 39 weeks   Tests next visit: GCT  HgB   Next visit: 4 week(s)     Juan Meza MD  Obstetrics and Gynecology  [facility]

## 2018-11-02 LAB
A VAGINAE DNA VAG QL NAA+PROBE: ABNORMAL SCORE
BVAB2 DNA VAG QL NAA+PROBE: ABNORMAL SCORE
C ALBICANS DNA VAG QL NAA+PROBE: POSITIVE
C GLABRATA DNA VAG QL NAA+PROBE: NEGATIVE
C TRACH RRNA SPEC QL NAA+PROBE: NEGATIVE
MEGA1 DNA VAG QL NAA+PROBE: ABNORMAL SCORE
N GONORRHOEA RRNA SPEC QL NAA+PROBE: NEGATIVE
T VAGINALIS RRNA SPEC QL NAA+PROBE: NEGATIVE

## 2018-11-04 DIAGNOSIS — B37.31 VULVOVAGINAL CANDIDIASIS: Primary | ICD-10-CM

## 2018-11-14 ENCOUNTER — HOSPITAL ENCOUNTER (OUTPATIENT)
Facility: HOSPITAL | Age: 23
Discharge: HOME | End: 2018-11-14
Attending: MIDWIFE | Admitting: MIDWIFE

## 2018-11-14 VITALS
RESPIRATION RATE: 16 BRPM | SYSTOLIC BLOOD PRESSURE: 107 MMHG | DIASTOLIC BLOOD PRESSURE: 65 MMHG | OXYGEN SATURATION: 100 % | WEIGHT: 110 LBS | BODY MASS INDEX: 22.22 KG/M2 | HEART RATE: 108 BPM | TEMPERATURE: 97.7 F

## 2018-11-14 LAB
A1 MICROGLOB PLACENTAL VAG QL: NEGATIVE
BACTERIA UR QL AUTO: ABNORMAL /HPF
BILIRUB UR QL STRIP: NEGATIVE
CLARITY UR: CLEAR
COLOR UR: YELLOW
GLUCOSE UR STRIP-MCNC: NEGATIVE MG/DL
HGB UR QL STRIP.AUTO: NEGATIVE
HYALINE CASTS UR QL AUTO: ABNORMAL /LPF
KETONES UR QL STRIP: NEGATIVE
LEUKOCYTE ESTERASE UR QL STRIP.AUTO: ABNORMAL
NITRITE UR QL STRIP: NEGATIVE
PH UR STRIP.AUTO: 7.5 [PH] (ref 5–8)
PROT UR QL STRIP: NEGATIVE
RBC # UR: ABNORMAL /HPF
REF LAB TEST METHOD: ABNORMAL
SP GR UR STRIP: 1.02 (ref 1–1.03)
SQUAMOUS #/AREA URNS HPF: ABNORMAL /HPF
TRANS CELLS #/AREA URNS HPF: ABNORMAL /HPF
UROBILINOGEN UR QL STRIP: ABNORMAL
WBC UR QL AUTO: ABNORMAL /HPF

## 2018-11-14 PROCEDURE — 81001 URINALYSIS AUTO W/SCOPE: CPT | Performed by: MIDWIFE

## 2018-11-14 PROCEDURE — 84112 EVAL AMNIOTIC FLUID PROTEIN: CPT | Performed by: MIDWIFE

## 2018-11-14 PROCEDURE — 87086 URINE CULTURE/COLONY COUNT: CPT | Performed by: MIDWIFE

## 2018-11-14 PROCEDURE — G0463 HOSPITAL OUTPT CLINIC VISIT: HCPCS

## 2018-11-14 NOTE — NURSING NOTE
YANA MARLEY notified of pt's arrival to  with complaints of lower back pain, increased vaginal discharge and itching.  Pt states she has recently been treated for yeast infection, but has not helped.  Pt states discharge has been clear/yellow to green at times and watery.  amnisure performed and was negative.  Orders received to check cervix, and may d/c home and to follow up in am.

## 2018-11-15 NOTE — PROGRESS NOTES
Telephone triage per RN:  Chief Complaint   Patient presents with   • Vaginal Discharge     rasheed been soaking pads. sometimes its green, othertimes its clear yellow     FHT appropriate for gestational age  VE: closed thick, RN reported mild labial irritation  DX: vaginal discharge in pregnancy, second trimester  DC to home. Advised to come to office in am.

## 2018-11-16 LAB — BACTERIA SPEC AEROBE CULT: NO GROWTH

## 2018-11-20 ENCOUNTER — ROUTINE PRENATAL (OUTPATIENT)
Dept: OBSTETRICS AND GYNECOLOGY | Facility: CLINIC | Age: 23
End: 2018-11-20

## 2018-11-20 ENCOUNTER — TELEPHONE (OUTPATIENT)
Dept: OBSTETRICS AND GYNECOLOGY | Facility: CLINIC | Age: 23
End: 2018-11-20

## 2018-11-20 VITALS — BODY MASS INDEX: 22.22 KG/M2 | DIASTOLIC BLOOD PRESSURE: 62 MMHG | SYSTOLIC BLOOD PRESSURE: 106 MMHG | WEIGHT: 110 LBS

## 2018-11-20 DIAGNOSIS — B37.31 VULVOVAGINAL CANDIDIASIS: ICD-10-CM

## 2018-11-20 DIAGNOSIS — Z98.891 HX OF CESAREAN SECTION: ICD-10-CM

## 2018-11-20 DIAGNOSIS — Z34.92 PRENATAL CARE IN SECOND TRIMESTER: Primary | ICD-10-CM

## 2018-11-20 PROCEDURE — 99214 OFFICE O/P EST MOD 30 MIN: CPT | Performed by: OBSTETRICS & GYNECOLOGY

## 2018-11-20 RX ORDER — ONDANSETRON 4 MG/1
4 TABLET, FILM COATED ORAL EVERY 6 HOURS PRN
Qty: 12 TABLET | Refills: 0 | Status: SHIPPED | OUTPATIENT
Start: 2018-11-20 | End: 2018-12-10 | Stop reason: SDUPTHER

## 2018-11-20 RX ORDER — ONDANSETRON 4 MG/1
TABLET, ORALLY DISINTEGRATING ORAL
Refills: 0 | COMMUNITY
Start: 2018-10-22 | End: 2018-11-20 | Stop reason: SDUPTHER

## 2018-11-20 NOTE — PROGRESS NOTES
Chief Complaint   Patient presents with   • Routine Prenatal Visit     c/o issues with sugar getting up to 145, not feeling good and yeast infection is back       HPI:   Cortney is a  currently at 25w5d who today reports the following:  Contractions - No; Leaking - No; Vaginal bleeding -  No; Swelling of extremities - No. Good fetal movement - YES.    Yeast infection symptoms again, + pruritus, + discharge, + odor    ROS:  GI: Nausea - YES; Constipation - No; Diarrhea - No. RUQ pain - No    Neuro: Headache - No; Visual disturbances - No.    Pertinent items are noted in HPI, all other systems reviewed and negative    Review of History:  The following portions of the patient's history were reviewed and updated as appropriate:problem list, current medications, allergies, past family history, past medical history, past social history and past surgical history.    Current Outpatient Medications on File Prior to Visit   Medication Sig Dispense Refill   • hydrOXYzine (ATARAX) 25 MG tablet Take 1 tablet by mouth At Night As Needed for Itching. 20 tablet 0   • Prenatal Vit-Fe Fumarate-FA (PRENATAL VITAMINS) 28-0.8 MG tablet Take 1 tablet by mouth Daily. 90 tablet 3   • psyllium (METAMUCIL SMOOTH TEXTURE) 28 % packet Take 1 packet by mouth Daily. 30 each 11   • [DISCONTINUED] ondansetron (ZOFRAN) 4 MG tablet Take 1 tablet by mouth Every 6 (Six) Hours As Needed for Nausea or Vomiting. 12 tablet 0   • doxylamine (UNISOM) 25 MG tablet Take 1 tablet by mouth Every Night. 30 tablet 5   • guaiFENesin (MUCINEX) 600 MG 12 hr tablet Take 1 tablet by mouth 2 (Two) Times a Day. 30 tablet 0   • vitamin B-6 (PYRIDOXINE) 25 MG tablet Take 1 tablet by mouth every night at bedtime. 30 tablet 5   • [DISCONTINUED] ondansetron ODT (ZOFRAN-ODT) 4 MG disintegrating tablet DISSOLVE 1 TABLET IN MOUTH EVERY EIGHT HOURS AS NEEDED  0     No current facility-administered medications on file prior to visit.        EXAM:  /62   Wt 49.9 kg  (110 lb)   LMP 05/28/2018   BMI 22.22 kg/m²     Gen: NAD, conversant  Pulm: No use of accessory muscles, normal respirations  Abdomen: Gravid, nontender, size = dates, + fetal cardiac activity  Ext: no edema, no rashes, WWP  Gait: normal for pregnancy  Psych: Mood, insight, judgement intact  Pelvic: Erythema diffusely on vulva, erythema within the vagina, thick white discharge coating vaginal walls and cervix    Lab Results   Component Value Date    ABORH B Rh Positive 11/21/2014    ABO B 07/24/2018    RH Positive 07/24/2018    ABSCRN Negative 07/24/2018       Social History    Tobacco Use      Smoking status: Never Smoker      Smokeless tobacco: Never Used      I have reviewed the prenatal labs and previous ultrasounds today.    MDM:  Diagnosis: Supervision of low risk pregnancy  Previous C/S with planned repeat C/S  Desires sterility  Recurrent yeast infections   Tests/Orders/Rx today: Orders Placed This Encounter   Procedures   • Gestational Screen 1 Hr ([facility])   • CBC (No Diff)     Meds Ordered: Butoconazole cream, refill Zofran   Topics discussed: Yeast symptoms and treatment   Plan for rLTCS + BTL at 39 weeks   Tests next visit: none, Glucola at her first convenience prior to next visit   Next visit: 4 week(s)     Juan Meza MD  Obstetrics and Gynecology  [facility]

## 2018-11-20 NOTE — TELEPHONE ENCOUNTER
----- Message from Georgina Holbrook sent at 11/20/2018  3:46 PM EST -----  Contact: patient  Patient called stating that the medication Dr. Meza sent in for her today was not covered by her insurance. Can she have something else sent in?    Thanks.

## 2018-11-30 DIAGNOSIS — D50.9 IRON DEFICIENCY ANEMIA, UNSPECIFIED IRON DEFICIENCY ANEMIA TYPE: Primary | ICD-10-CM

## 2018-11-30 LAB
ERYTHROCYTE [DISTWIDTH] IN BLOOD BY AUTOMATED COUNT: 13.4 % (ref 11.5–14.5)
GLUCOSE 1H P 50 G GLC PO SERPL-MCNC: 202 MG/DL
HCT VFR BLD AUTO: 32.5 % (ref 37–47)
HGB BLD-MCNC: 10.1 G/DL (ref 12–16)
MCH RBC QN AUTO: 24.5 PG (ref 27–31)
MCHC RBC AUTO-ENTMCNC: 31.1 G/DL (ref 30–37)
MCV RBC AUTO: 78.9 FL (ref 81–99)
PLATELET # BLD AUTO: 198 10*3/MM3 (ref 130–400)
RBC # BLD AUTO: 4.12 10*6/MM3 (ref 4.2–5.4)
WBC # BLD AUTO: 16.3 10*3/MM3 (ref 4.8–10.8)

## 2018-11-30 RX ORDER — FERROUS SULFATE 325(65) MG
325 TABLET ORAL
Qty: 60 TABLET | Refills: 6 | Status: ON HOLD | OUTPATIENT
Start: 2018-11-30 | End: 2019-02-14 | Stop reason: SDUPTHER

## 2018-12-03 ENCOUNTER — ROUTINE PRENATAL (OUTPATIENT)
Dept: OBSTETRICS AND GYNECOLOGY | Facility: CLINIC | Age: 23
End: 2018-12-03

## 2018-12-03 VITALS — SYSTOLIC BLOOD PRESSURE: 104 MMHG | BODY MASS INDEX: 22.82 KG/M2 | WEIGHT: 113 LBS | DIASTOLIC BLOOD PRESSURE: 64 MMHG

## 2018-12-03 DIAGNOSIS — B37.31 VULVOVAGINAL CANDIDIASIS: ICD-10-CM

## 2018-12-03 DIAGNOSIS — O24.410 GESTATIONAL DIABETES MELLITUS, CLASS A1: ICD-10-CM

## 2018-12-03 DIAGNOSIS — Z98.891 HX OF CESAREAN SECTION: ICD-10-CM

## 2018-12-03 DIAGNOSIS — Z34.92 PRENATAL CARE IN SECOND TRIMESTER: Primary | ICD-10-CM

## 2018-12-03 PROCEDURE — 99214 OFFICE O/P EST MOD 30 MIN: CPT | Performed by: OBSTETRICS & GYNECOLOGY

## 2018-12-03 RX ORDER — LANCETS 28 GAUGE
EACH MISCELLANEOUS
Qty: 100 EACH | Refills: 12 | Status: SHIPPED | OUTPATIENT
Start: 2018-12-03 | End: 2021-04-13

## 2018-12-03 RX ORDER — BLOOD-GLUCOSE METER
KIT MISCELLANEOUS
Qty: 1 EACH | Refills: 0 | Status: SHIPPED | OUTPATIENT
Start: 2018-12-03 | End: 2018-12-10 | Stop reason: SDUPTHER

## 2018-12-03 NOTE — PROGRESS NOTES
Chief Complaint   Patient presents with   • Pregnancy Problem     Here to discuss results of Glucola, Needs a different prescription for the yeast due to insurance not wanting to cover the initial prescription.        HPI:   Cortney is a  currently at 27w4d who today reports the following:  Contractions - No; Leaking - No; Vaginal bleeding -  No; Swelling of extremities - No. Good fetal movement - YES.    Continued yeast symptoms    ROS:  GI: Nausea - No; Constipation - No; Diarrhea - No. RUQ pain - No    Neuro: Headache - No; Visual disturbances - No.    Pertinent items are noted in HPI, all other systems reviewed and negative    Review of History:  The following portions of the patient's history were reviewed and updated as appropriate:problem list, current medications, allergies, past family history, past medical history, past social history and past surgical history.    Current Outpatient Medications on File Prior to Visit   Medication Sig Dispense Refill   • doxylamine (UNISOM) 25 MG tablet Take 1 tablet by mouth Every Night. 30 tablet 5   • ferrous sulfate 325 (65 FE) MG tablet Take 1 tablet by mouth 2 (Two) Times a Day Before Meals. 60 tablet 6   • hydrOXYzine (ATARAX) 25 MG tablet Take 1 tablet by mouth At Night As Needed for Itching. 20 tablet 0   • Prenatal Vit-Fe Fumarate-FA (PRENATAL VITAMINS) 28-0.8 MG tablet Take 1 tablet by mouth Daily. 90 tablet 3   • psyllium (METAMUCIL SMOOTH TEXTURE) 28 % packet Take 1 packet by mouth Daily. 30 each 11   • vitamin B-6 (PYRIDOXINE) 25 MG tablet Take 1 tablet by mouth every night at bedtime. 30 tablet 5   • guaiFENesin (MUCINEX) 600 MG 12 hr tablet Take 1 tablet by mouth 2 (Two) Times a Day. 30 tablet 0   • ondansetron (ZOFRAN) 4 MG tablet Take 1 tablet by mouth Every 6 (Six) Hours As Needed for Nausea or Vomiting. 12 tablet 0     No current facility-administered medications on file prior to visit.        EXAM:  /64   Wt 51.3 kg (113 lb)   LMP  05/28/2018   BMI 22.82 kg/m²     Gen: NAD, conversant  Pulm: No use of accessory muscles, normal respirations  Abdomen: Gravid, nontender, size = dates, + fetal cardiac activity  Ext: no edema, no rashes, WWP  Gait: normal for pregnancy  Psych: Mood, insight, judgement intact  SVE: Not performed     Lab Results   Component Value Date    ABORH B Rh Positive 11/21/2014    ABO B 07/24/2018    RH Positive 07/24/2018    ABSCRN Negative 07/24/2018       Social History    Tobacco Use      Smoking status: Never Smoker      Smokeless tobacco: Never Used      I have reviewed the prenatal labs and previous ultrasounds today.    MDM:  Diagnosis: Supervision of low risk pregnancy  Previous C/S with planned repeat C/S  Desires sterility  Recurrent yeast infections  A1DM   Tests/Orders/Rx today: No orders of the defined types were placed in this encounter.    Meds Ordered: Terconazole with refills, glucometer/test strips/lancets   Topics discussed: glucose management - we reviewed diagnosis, instructions for glucose logs.  Plan to return in 1 week to review log numbers and discuss possible therapies.     Tests next visit: none   Next visit: 1 week(s)     Greater than 50% of this 25 minute visit was spent in face-to-face counseling and/or coordination of care for this patient.    Juan Meza MD  Obstetrics and Gynecology  [facility]

## 2018-12-06 ENCOUNTER — TELEPHONE (OUTPATIENT)
Dept: OBSTETRICS AND GYNECOLOGY | Facility: CLINIC | Age: 23
End: 2018-12-06

## 2018-12-06 NOTE — TELEPHONE ENCOUNTER
----- Message from Lxeie Beck sent at 12/6/2018  1:55 PM EST -----  Contact: PT  PT IS 28 WEEKS PREGNANT AND CALLED REQUESTING RX FOR CONSTIPATION.  SHE USES [facility] IN [place].  THANKS

## 2018-12-07 PROBLEM — O24.410 GESTATIONAL DIABETES MELLITUS, CLASS A1: Status: ACTIVE | Noted: 2018-12-07

## 2018-12-10 ENCOUNTER — ROUTINE PRENATAL (OUTPATIENT)
Dept: OBSTETRICS AND GYNECOLOGY | Facility: CLINIC | Age: 23
End: 2018-12-10

## 2018-12-10 VITALS — WEIGHT: 112 LBS | BODY MASS INDEX: 22.62 KG/M2 | DIASTOLIC BLOOD PRESSURE: 62 MMHG | SYSTOLIC BLOOD PRESSURE: 100 MMHG

## 2018-12-10 DIAGNOSIS — O24.419 GESTATIONAL DIABETES MELLITUS, CLASS A2: ICD-10-CM

## 2018-12-10 DIAGNOSIS — Z34.93 PRENATAL CARE IN THIRD TRIMESTER: Primary | ICD-10-CM

## 2018-12-10 DIAGNOSIS — Z98.891 HX OF CESAREAN SECTION: ICD-10-CM

## 2018-12-10 DIAGNOSIS — O21.9 NAUSEA AND VOMITING IN PREGNANCY: ICD-10-CM

## 2018-12-10 PROBLEM — O24.410 GESTATIONAL DIABETES MELLITUS, CLASS A1: Status: RESOLVED | Noted: 2018-12-07 | Resolved: 2018-12-10

## 2018-12-10 PROCEDURE — 99214 OFFICE O/P EST MOD 30 MIN: CPT | Performed by: OBSTETRICS & GYNECOLOGY

## 2018-12-10 RX ORDER — GLYBURIDE 2.5 MG/1
2.5 TABLET ORAL
Qty: 30 TABLET | Refills: 5 | Status: SHIPPED | OUTPATIENT
Start: 2018-12-10 | End: 2019-01-08 | Stop reason: SDUPTHER

## 2018-12-10 RX ORDER — GLYBURIDE 2.5 MG/1
2.5 TABLET ORAL
Qty: 30 TABLET | Refills: 5 | Status: SHIPPED | OUTPATIENT
Start: 2018-12-10 | End: 2018-12-10 | Stop reason: SDUPTHER

## 2018-12-10 RX ORDER — BLOOD-GLUCOSE METER
EACH MISCELLANEOUS
Refills: 0 | COMMUNITY
Start: 2018-12-03 | End: 2021-04-13

## 2018-12-10 RX ORDER — ONDANSETRON 4 MG/1
4 TABLET, FILM COATED ORAL EVERY 6 HOURS PRN
Qty: 20 TABLET | Refills: 3 | Status: SHIPPED | OUTPATIENT
Start: 2018-12-10 | End: 2019-02-14 | Stop reason: HOSPADM

## 2018-12-10 RX ORDER — DOCUSATE SODIUM 100 MG/1
100 CAPSULE, LIQUID FILLED ORAL 2 TIMES DAILY
Qty: 60 CAPSULE | Refills: 3 | Status: ON HOLD | OUTPATIENT
Start: 2018-12-10 | End: 2019-02-14 | Stop reason: SDUPTHER

## 2018-12-10 NOTE — TELEPHONE ENCOUNTER
I Escribed Colace BID but not sure if insurance will cover it. She may need to get OTC. She also needs to increase water and fiber in her diet. She can take Miralax if needed. She can also take fiber gummies BID.

## 2018-12-10 NOTE — PROGRESS NOTES
Chief Complaint   Patient presents with   • Routine Prenatal Visit     Patient has glucose log to discuss today       HPI:   Cortney is a  currently at 28w4d who today reports the following:  Contractions - No; Leaking - No; Vaginal bleeding -  No; Swelling of extremities - No. Good fetal movement - YES.    ROS:  GI: Nausea - No; Constipation - No; Diarrhea - No. RUQ pain - No    Neuro: Headache - No; Visual disturbances - No.    Pertinent items are noted in HPI, all other systems reviewed and negative    Review of History:  The following portions of the patient's history were reviewed and updated as appropriate:problem list, current medications, allergies, past family history, past medical history, past social history and past surgical history.    Current Outpatient Medications on File Prior to Visit   Medication Sig Dispense Refill   • Blood Glucose Monitoring Suppl (ONE TOUCH ULTRA 2) w/Device kit USE TO CHECK BLOOD GLUCOSE ONCE DAILY OR AS DIRECTED  0   • ferrous sulfate 325 (65 FE) MG tablet Take 1 tablet by mouth 2 (Two) Times a Day Before Meals. 60 tablet 6   • hydrOXYzine (ATARAX) 25 MG tablet Take 1 tablet by mouth At Night As Needed for Itching. 20 tablet 0   • Prenatal Vit-Fe Fumarate-FA (PRENATAL VITAMINS) 28-0.8 MG tablet Take 1 tablet by mouth Daily. 90 tablet 3   • psyllium (METAMUCIL SMOOTH TEXTURE) 28 % packet Take 1 packet by mouth Daily. 30 each 11   • terconazole (TERAZOL 7) 0.4 % vaginal cream Insert 1 applicator into the vagina Every Night for 7 days. 45 g 3   • docusate sodium (COLACE) 100 MG capsule Take 1 capsule by mouth 2 (Two) Times a Day. 60 capsule 3   • doxylamine (UNISOM) 25 MG tablet Take 1 tablet by mouth Every Night. 30 tablet 5   • glucose blood (IGLUCOSE TEST STRIPS) test strip Use as instructed 100 each 12   • guaiFENesin (MUCINEX) 600 MG 12 hr tablet Take 1 tablet by mouth 2 (Two) Times a Day. 30 tablet 0   • Lancets (FREESTYLE) lancets Use as directed by your physician  100 each 12   • vitamin B-6 (PYRIDOXINE) 25 MG tablet Take 1 tablet by mouth every night at bedtime. 30 tablet 5   • [DISCONTINUED] glucose monitor monitoring kit Use as directed by your physician 1 each 0   • [DISCONTINUED] ondansetron (ZOFRAN) 4 MG tablet Take 1 tablet by mouth Every 6 (Six) Hours As Needed for Nausea or Vomiting. 12 tablet 0     No current facility-administered medications on file prior to visit.        EXAM:  /62   Wt 50.8 kg (112 lb)   LMP 05/28/2018   BMI 22.62 kg/m²     Gen: NAD, conversant  Pulm: No use of accessory muscles, normal respirations  Abdomen: Gravid, nontender, size = dates, + fetal cardiac activity  Ext: no edema, no rashes, WWP  Gait: normal for pregnancy  Psych: Mood, insight, judgement intact  SVE: Not performed     Lab Results   Component Value Date    ABORH B Rh Positive 11/21/2014    ABO B 07/24/2018    RH Positive 07/24/2018    ABSCRN Negative 07/24/2018       Social History    Tobacco Use      Smoking status: Never Smoker      Smokeless tobacco: Never Used      I have reviewed the prenatal labs and previous ultrasounds today.    MDM:  Diagnosis: Supervision of low risk pregnancy  Previous C/S with planned repeat C/S  Desires sterility  Recurrent yeast infections  A2DM   Tests/Orders/Rx today: No orders of the defined types were placed in this encounter.    Meds Ordered: Glyburide 2.5mg daily   Topics discussed: glucose management - Fastings > 95, PP values mostly > 120, will start Glyburide, RTC in 1-2 weeks for log review, Twice weekly ANT beginning at 32 weeks  Plan for rLTCS + BTL at 39 weeks   Tests next visit: none   Next visit: 1-2 week(s)       Juan Meza MD  Obstetrics and Gynecology  [facility]

## 2018-12-12 ENCOUNTER — TELEPHONE (OUTPATIENT)
Dept: OBSTETRICS AND GYNECOLOGY | Facility: CLINIC | Age: 23
End: 2018-12-12

## 2018-12-19 ENCOUNTER — ROUTINE PRENATAL (OUTPATIENT)
Dept: OBSTETRICS AND GYNECOLOGY | Facility: CLINIC | Age: 23
End: 2018-12-19

## 2018-12-19 VITALS — WEIGHT: 111 LBS | BODY MASS INDEX: 22.42 KG/M2 | DIASTOLIC BLOOD PRESSURE: 70 MMHG | SYSTOLIC BLOOD PRESSURE: 124 MMHG

## 2018-12-19 DIAGNOSIS — O24.419 GESTATIONAL DIABETES MELLITUS, CLASS A2: ICD-10-CM

## 2018-12-19 DIAGNOSIS — Z98.891 HX OF CESAREAN SECTION: ICD-10-CM

## 2018-12-19 DIAGNOSIS — Z34.93 PRENATAL CARE IN THIRD TRIMESTER: Primary | ICD-10-CM

## 2018-12-19 PROCEDURE — 99213 OFFICE O/P EST LOW 20 MIN: CPT | Performed by: OBSTETRICS & GYNECOLOGY

## 2018-12-19 NOTE — PROGRESS NOTES
Chief Complaint   Patient presents with   • Routine Prenatal Visit     c/o pelvic pain and pressure        HPI:   Cortney is a  currently at 29w6d who today reports the following:  Contractions - No; Leaking - No; Vaginal bleeding -  No; Swelling of extremities - No. Good fetal movement - YES.    ROS:  GI: Nausea - No; Constipation - No; Diarrhea - No. RUQ pain - No    Neuro: Headache - No; Visual disturbances - No.    Pertinent items are noted in HPI, all other systems reviewed and negative    Review of History:  The following portions of the patient's history were reviewed and updated as appropriate:problem list, current medications, allergies, past family history, past medical history, past social history and past surgical history.    Current Outpatient Medications on File Prior to Visit   Medication Sig Dispense Refill   • Blood Glucose Monitoring Suppl (ONE TOUCH ULTRA 2) w/Device kit USE TO CHECK BLOOD GLUCOSE ONCE DAILY OR AS DIRECTED  0   • docusate sodium (COLACE) 100 MG capsule Take 1 capsule by mouth 2 (Two) Times a Day. 60 capsule 3   • doxylamine (UNISOM) 25 MG tablet Take 1 tablet by mouth Every Night. 30 tablet 5   • ferrous sulfate 325 (65 FE) MG tablet Take 1 tablet by mouth 2 (Two) Times a Day Before Meals. 60 tablet 6   • glucose blood (IGLUCOSE TEST STRIPS) test strip Use as instructed 100 each 12   • glyBURIDE (DIABETA) 2.5 MG tablet Take 1 tablet by mouth Daily With Breakfast. 30 tablet 5   • guaiFENesin (MUCINEX) 600 MG 12 hr tablet Take 1 tablet by mouth 2 (Two) Times a Day. 30 tablet 0   • hydrOXYzine (ATARAX) 25 MG tablet Take 1 tablet by mouth At Night As Needed for Itching. 20 tablet 0   • Lancets (FREESTYLE) lancets Use as directed by your physician 100 each 12   • ondansetron (ZOFRAN) 4 MG tablet Take 1 tablet by mouth Every 6 (Six) Hours As Needed for Nausea or Vomiting. 20 tablet 3   • Prenatal Vit-Fe Fumarate-FA (PRENATAL VITAMINS) 28-0.8 MG tablet Take 1 tablet by mouth Daily.  90 tablet 3   • psyllium (METAMUCIL SMOOTH TEXTURE) 28 % packet Take 1 packet by mouth Daily. 30 each 11   • vitamin B-6 (PYRIDOXINE) 25 MG tablet Take 1 tablet by mouth every night at bedtime. 30 tablet 5     No current facility-administered medications on file prior to visit.        EXAM:  /70   Wt 50.3 kg (111 lb)   LMP 2018   BMI 22.42 kg/m²     Gen: NAD, conversant  Pulm: No use of accessory muscles, normal respirations  Abdomen: Gravid, nontender, size = dates, + fetal cardiac activity  Ext: no edema, no rashes, WWP  Gait: normal for pregnancy  Psych: Mood, insight, judgement intact  SVE: 0/0/-3    Lab Results   Component Value Date    ABORH B Rh Positive 2014    ABO B 2018    RH Positive 2018    ABSCRN Negative 2018       Social History    Tobacco Use      Smoking status: Never Smoker      Smokeless tobacco: Never Used      I have reviewed the prenatal labs and previous ultrasounds today.    MDM:  Diagnosis: Supervision of high risk pregnancy  DM - GDMA2  Previous C/S with planned repeat C/S   Desires tubal sterilization   Tests/Orders/Rx today: No orders of the defined types were placed in this encounter.    Meds Ordered: none   Topics discussed: kick counts and fetal movement  PIH precautions   labor signs and symptoms   Tubal paper signed today  A2DM - Glyburide at night, continue glucose logs, return in 1 week for glucose review   Tests next visit: none   Next visit: 1 week(s)     Juan Meza MD  Obstetrics and Gynecology  [facility]

## 2018-12-29 ENCOUNTER — TELEPHONE (OUTPATIENT)
Dept: LABOR AND DELIVERY | Facility: HOSPITAL | Age: 23
End: 2018-12-29

## 2018-12-30 NOTE — TELEPHONE ENCOUNTER
"Nursing Documentation  Phone call note.    Cortney Pastor  : 1995  MRN: [id number]  CSN: [id number]    Date  / Time   2018  Time : 1908    Nurse: Dedra Daigle, RN    Patient Info: She is a 23 y.o. year old  at 31w2d with an ELYSSA of 2019, by Ultrasound who called [facility].    Chief Complaint:   Chief Complaint   Patient presents with   • Blood Sugar Problem     \"I've been worrying about my blood sugar today.  I woke up with a low blood sugar (79) and ate a bowl of cereal (K[employer]).  I haven't really eaten much else today and my blood sugar keeps climbing.  It was 208 and then came down to 156 and now it is 201.  I don't know what else to do.  I took my Glyburide 2.5mg this morning.\"       Provider Instructions / Disposition: Instructed pt to come to formerly [facility] if she feels like she needs to and I would call Dr Brian to see what she wants to do with her blood sugar.  Discussed foods pt had eaten today and encouraged her to eat something with protein like meat of eggs.  Pt states she doesn't have any meat in the house right now, but does have eggs.  Disucssed importance of eating protein and carbohydrates together and to supplement further food with vegetables like green beans.  Pt verbalized that she is going to eat some eggs and see what her blood sugar does and come in if it didn't go back down.  Pt stated right before she hung up that her baby hasn't moved as much ever since she has been taking her Glyburide and it used to be very active.  Discussed that high blood sugar will cause the baby to not be very active.      Patient Active Problem List   Diagnosis   • HPV in female   • Epigastric pain   • Left upper quadrant pain   • Nausea   • Heartburn   • Constipation   • Ovarian cyst   • Hx of  section   • Rubella non-immune status, antepartum   • Vulvovaginal candidiasis   • Gestational diabetes mellitus, class A2           "

## 2019-01-04 ENCOUNTER — HOSPITAL ENCOUNTER (OUTPATIENT)
Facility: HOSPITAL | Age: 24
Discharge: HOME | End: 2019-01-04
Attending: OBSTETRICS & GYNECOLOGY | Admitting: OBSTETRICS & GYNECOLOGY

## 2019-01-04 ENCOUNTER — ROUTINE PRENATAL (OUTPATIENT)
Dept: OBSTETRICS AND GYNECOLOGY | Facility: CLINIC | Age: 24
End: 2019-01-04

## 2019-01-04 VITALS
BODY MASS INDEX: 22.58 KG/M2 | SYSTOLIC BLOOD PRESSURE: 125 MMHG | DIASTOLIC BLOOD PRESSURE: 81 MMHG | TEMPERATURE: 98 F | HEART RATE: 100 BPM | RESPIRATION RATE: 16 BRPM | HEIGHT: 59 IN | OXYGEN SATURATION: 100 % | WEIGHT: 112 LBS

## 2019-01-04 VITALS — WEIGHT: 112 LBS | BODY MASS INDEX: 22.62 KG/M2 | SYSTOLIC BLOOD PRESSURE: 122 MMHG | DIASTOLIC BLOOD PRESSURE: 70 MMHG

## 2019-01-04 DIAGNOSIS — O24.415 GESTATIONAL DIABETES MELLITUS (GDM) IN THIRD TRIMESTER CONTROLLED ON ORAL HYPOGLYCEMIC DRUG: ICD-10-CM

## 2019-01-04 DIAGNOSIS — Z36.89 ENCOUNTER FOR ULTRASOUND TO CHECK FETAL GROWTH: Primary | ICD-10-CM

## 2019-01-04 LAB
BILIRUB BLD-MCNC: NEGATIVE MG/DL
CLARITY, POC: CLEAR
COLOR UR: YELLOW
GLUCOSE BLDC GLUCOMTR-MCNC: 101 MG/DL (ref 70–130)
GLUCOSE BLDC GLUCOMTR-MCNC: 67 MG/DL (ref 70–130)
GLUCOSE UR STRIP-MCNC: NEGATIVE MG/DL
HBA1C MFR BLD: 5.2 % (ref 3–6)
KETONES UR QL: ABNORMAL
LEUKOCYTE EST, POC: NEGATIVE
NITRITE UR-MCNC: NEGATIVE MG/ML
PH UR: 5 [PH] (ref 5–8)
PROT UR STRIP-MCNC: NEGATIVE MG/DL
RBC # UR STRIP: NEGATIVE /UL
SP GR UR: 1 (ref 1–1.03)
UROBILINOGEN UR QL: NORMAL

## 2019-01-04 PROCEDURE — 83036 HEMOGLOBIN GLYCOSYLATED A1C: CPT | Performed by: OBSTETRICS & GYNECOLOGY

## 2019-01-04 PROCEDURE — 59025 FETAL NON-STRESS TEST: CPT | Performed by: OBSTETRICS & GYNECOLOGY

## 2019-01-04 PROCEDURE — 82962 GLUCOSE BLOOD TEST: CPT

## 2019-01-04 PROCEDURE — 81002 URINALYSIS NONAUTO W/O SCOPE: CPT | Performed by: OBSTETRICS & GYNECOLOGY

## 2019-01-04 PROCEDURE — G0463 HOSPITAL OUTPT CLINIC VISIT: HCPCS

## 2019-01-04 PROCEDURE — 59025 FETAL NON-STRESS TEST: CPT

## 2019-01-04 PROCEDURE — 36415 COLL VENOUS BLD VENIPUNCTURE: CPT | Performed by: OBSTETRICS & GYNECOLOGY

## 2019-01-04 PROCEDURE — 99214 OFFICE O/P EST MOD 30 MIN: CPT | Performed by: NURSE PRACTITIONER

## 2019-01-04 NOTE — PROGRESS NOTES
03320  Chief Complaint   Patient presents with   • Routine Prenatal Visit     growth scan today        HPI  , 32w1d reports FBS are running 50-80's  -  1 hr PP running sometimes higher than 190 - they are very unstable.   Eating normal foods - has not seen dietician.    Taking glyburide 2.5 evening only   FM the past couple of days...maybe kicks 4-5 x day   C/O some contractions - (described)        ROS  /70   Wt 50.8 kg (112 lb)   LMP 2018   BMI 22.62 kg/m²  -See Prenatal Assessment    ROS:      GI: Nausea - No; Constipation - No;    Diarrhea - No    Neuro: Headache - No; Visual change - No      EXAM  General Appearance:  Pleasant /anxious  Lungs: Breathing unlabored  Abdomen:  See flow sheet for Fundal ht, FM, FHT's  LE: Neg edema    MDM  Impression:  Problems/Risk High Risk Pregnancy  GDM - uncontrolled   Decreased FM  Previous C/S    contractions    Tests done today: NST, HgbA1C, RBS   Topics discussed: Importance to note good FM/kick counts   We rev'd eating healthy food / no simple sugars & will be scheduled with dietician (will request urgent) - importance to keep scheduled appt  FBS and PP BS's - importance to record for practitioner to see   To continue glyburide as instructed after seen in  today   S/S PTL VS BH's contractions      was in attendance - encouraged questions to both - reviewed understanding of  - call prn    Tests next visit: none     Total time spent today with Cortney was 30 minutes (level 4).  Greater than 50% of the time was spent face to face coordinating and planning care, answering her questions and counseling regarding as written above          OB History      Para Term  AB Living    3 2 2     2    SAB TAB Ectopic Molar Multiple Live Births              2          Past Medical History:   Diagnosis Date   • Abnormal Pap smear of cervix 2015    ASCUS +HPV   • Anxiety and depression    • Body piercing     LOWER LIP, TOP OF LEFT  EAR   • Constipation    • Depression    • GERD (gastroesophageal reflux disease)    • Hearing loss     REPORTS USES NO HEARING DEVICES   • History of blood transfusion 2016    REPORTS NO REACTION   • History of Papanicolaou smear of cervix 2016    NORMAL    • HPV in female    • Hypoglycemia    • Migraine    • Ovarian cyst 2017    RIGHT    • Rubella non-immune status, antepartum 2018   • Tattoos     1 ON LEFT WRIST, BILATERAL SHOULDER BLADES, LEFT LEG       Past Surgical History:   Procedure Laterality Date   •  SECTION  2016    DR MARC SALINAS   • EAR TUBES         Family History   Problem Relation Age of Onset   • Cancer Other    • Diabetes Other    • Hypertension Other    • Hyperlipidemia Other    • Liver disease Other         aunt has liver disease from hep c   • Mental illness Other    • Thyroid disease Other    • Ulcers Mother        Social History     Socioeconomic History   • Marital status:      Spouse name: Not on file   • Number of children: Not on file   • Years of education: Not on file   • Highest education level: Not on file   Social Needs   • Financial resource strain: Not on file   • Food insecurity - worry: Not on file   • Food insecurity - inability: Not on file   • Transportation needs - medical: Not on file   • Transportation needs - non-medical: Not on file   Occupational History   • Not on file   Tobacco Use   • Smoking status: Never Smoker   • Smokeless tobacco: Never Used   Substance and Sexual Activity   • Alcohol use: No   • Drug use: No   • Sexual activity: Yes     Partners: Male   Other Topics Concern   • Not on file   Social History Narrative   • Not on file

## 2019-01-04 NOTE — NURSING NOTE
Random Finger stick blood sugar - - 67  Pt states hadnt ate since 20:30 last night Dr. Meza notified new orders received that pt could eat  brought pt a hamburger

## 2019-01-05 NOTE — NON STRESS TEST
Triage Note - Nursing Documentation  Labor and Delivery Admission Log    Cortney Pastor  : 1995  MRN: [id number]  CSN: [id number]    Date in / Time in:  2019  Time in:   Date out / Time out:    Time out:    Nurse: Luly Locke RN    Patient Info: She is a 23 y.o. year old  at 32w1d with an ELYSSA of 2019, by Ultrasound who was seen on the [facility].    Chief Complaint:   Chief Complaint   Patient presents with   • Non-stress Test       Provider Instructions / Disposition: return Tuesday    Patient Active Problem List   Diagnosis   • HPV in female   • Epigastric pain   • Left upper quadrant pain   • Nausea   • Heartburn   • Constipation   • Ovarian cyst   • Hx of  section   • Rubella non-immune status, antepartum   • Vulvovaginal candidiasis   • Gestational diabetes mellitus, class A2       NST Documentation (Only applicable > 32 weeks): Interpretation A  Nonstress Test Interpretation A: Reactive (19 1853 : Lisa Clemente RN)

## 2019-01-07 ENCOUNTER — HOSPITAL ENCOUNTER (OUTPATIENT)
Facility: HOSPITAL | Age: 24
Discharge: HOME | End: 2019-01-07
Attending: MIDWIFE | Admitting: MIDWIFE

## 2019-01-07 VITALS
WEIGHT: 112 LBS | HEIGHT: 59 IN | SYSTOLIC BLOOD PRESSURE: 123 MMHG | BODY MASS INDEX: 22.58 KG/M2 | OXYGEN SATURATION: 100 % | DIASTOLIC BLOOD PRESSURE: 75 MMHG | TEMPERATURE: 99.2 F | RESPIRATION RATE: 18 BRPM | HEART RATE: 101 BPM

## 2019-01-07 LAB
A1 MICROGLOB PLACENTAL VAG QL: NEGATIVE
AMPHET+METHAMPHET UR QL: NEGATIVE
AMPHETAMINES UR QL: NEGATIVE
BARBITURATES UR QL SCN: NEGATIVE
BENZODIAZ UR QL SCN: NEGATIVE
BILIRUB BLD-MCNC: NEGATIVE MG/DL
BUPRENORPHINE SERPL-MCNC: NEGATIVE NG/ML
CANNABINOIDS SERPL QL: NEGATIVE
CLARITY, POC: CLEAR
COCAINE UR QL: NEGATIVE
COLOR UR: YELLOW
GLUCOSE UR STRIP-MCNC: NEGATIVE MG/DL
KETONES UR QL: NEGATIVE
LEUKOCYTE EST, POC: NEGATIVE
METHADONE UR QL SCN: NEGATIVE
NITRITE UR-MCNC: NEGATIVE MG/ML
OPIATES UR QL: NEGATIVE
OXYCODONE UR QL SCN: NEGATIVE
PCP UR QL SCN: NEGATIVE
PH UR: 6.5 [PH] (ref 5–8)
PROPOXYPH UR QL: NEGATIVE
PROT UR STRIP-MCNC: NEGATIVE MG/DL
RBC # UR STRIP: NEGATIVE /UL
SP GR UR: 1.01 (ref 1–1.03)
TRICYCLICS UR QL SCN: NEGATIVE
UROBILINOGEN UR QL: NORMAL

## 2019-01-07 PROCEDURE — 59025 FETAL NON-STRESS TEST: CPT

## 2019-01-07 PROCEDURE — 80306 DRUG TEST PRSMV INSTRMNT: CPT | Performed by: MIDWIFE

## 2019-01-07 PROCEDURE — 81002 URINALYSIS NONAUTO W/O SCOPE: CPT | Performed by: MIDWIFE

## 2019-01-07 PROCEDURE — G0463 HOSPITAL OUTPT CLINIC VISIT: HCPCS

## 2019-01-07 PROCEDURE — 99213 OFFICE O/P EST LOW 20 MIN: CPT | Performed by: MIDWIFE

## 2019-01-07 PROCEDURE — 59025 FETAL NON-STRESS TEST: CPT | Performed by: MIDWIFE

## 2019-01-07 PROCEDURE — 84112 EVAL AMNIOTIC FLUID PROTEIN: CPT | Performed by: MIDWIFE

## 2019-01-07 NOTE — H&P
: 1995  MRN: [id number]  CSN: [id number]    History and Physical    Subjective   Cortney Pastor is a 23 y.o. year old  with an Estimated Date of Delivery: 19 currently at 32w4d brought by EMS due to  leaking fluid and vaginal bleeding.  Her symptoms started just prior to arrival. Her baby has been active. She denies recent intercourse. She was treated for yeast infection last week with cream. She took a picture of the blood and it was a very scant amount on a pad. She denies any contractions but may be feeling some cramping.     She has not been recently examined.    She had  labor with a previous pregnancy which was stopped at 26 weeks.      Obstetric History       T2      L2     SAB0   TAB0   Ectopic0   Molar0   Multiple0   Live Births2       # Outcome Date GA Lbr Flex/2nd Weight Sex Delivery Anes PTL Lv   3 Current            2 Term 2016 38w0d  3175 g (7 lb) F CS-LTranv   MUKESH      Complications: History of blood loss   1 Term 02/01/15   3175 g (7 lb) F Vag-Forceps EPI  MUKESH        Past Medical History:   Diagnosis Date   • Abnormal Pap smear of cervix 2015    ASCUS +HPV   • Anxiety and depression    • Body piercing     LOWER LIP, TOP OF LEFT EAR   • Constipation    • Depression    • GERD (gastroesophageal reflux disease)    • Hearing loss     REPORTS USES NO HEARING DEVICES   • History of blood transfusion 2016    REPORTS NO REACTION   • History of Papanicolaou smear of cervix 2016    NORMAL    • HPV in female    • Hypoglycemia    • Migraine    • Ovarian cyst 2017    RIGHT    • Rubella non-immune status, antepartum 2018   • Tattoos     1 ON LEFT WRIST, BILATERAL SHOULDER BLADES, LEFT LEG     Past Surgical History:   Procedure Laterality Date   •  SECTION  2016    DR MARC SALINAS   • EAR TUBES       No current facility-administered medications for this encounter.     Allergies   Allergen Reactions   • Latex Hives     Social  History    Tobacco Use      Smoking status: Never Smoker      Smokeless tobacco: Never Used      Review of Systems     Respiratory ROS: no cough, shortness of breath, or wheezing  Cardiovascular ROS: no chest pain or dyspnea on exertion  Gastrointestinal ROS: no abdominal pain, change in bowel habits, or black or bloody stools  Genito-Urinary ROS: no dysuria, trouble voiding, or hematuria        Objective   LMP 05/28/2018   General: well developed; well nourished  no acute distress   Abdomen: soft, non-tender; no masses  no umbilical or inguinal hernias are present  gravid   FHT's: reassuring, reactive and category 1      Cervix: was checked (by me): .5 cm / 0 % / -2, posterior and soft, white vaginal discharge noted, no blood   Presentation: cephalic   Contractions: none - external monitors used   Back: Not performed today     Prenatal Labs  Lab Results   Component Value Date    HGB 10.1 (L) 11/30/2018    HEPBSAG Negative 07/24/2018    ABORH B Rh Positive 11/21/2014    ABSCRN Negative 07/24/2018    YRC5ORM7 Non Reactive 07/24/2018    HEPCVIRUSABY 0.1 07/24/2018    URINECX No growth 11/14/2018       Current Labs Reviewed   UA and Amnisure neg         Assessment   1. IUP at 32w4d  2. Vaginal bleeding         Plan   1. Will monitor for contractions   2. Observe for vaginal bleeding  3. Keep scheduled followup    Cindy Dorado CNM  1/7/2019  5:23 PM

## 2019-01-08 ENCOUNTER — ROUTINE PRENATAL (OUTPATIENT)
Dept: OBSTETRICS AND GYNECOLOGY | Facility: CLINIC | Age: 24
End: 2019-01-08

## 2019-01-08 VITALS — DIASTOLIC BLOOD PRESSURE: 72 MMHG | BODY MASS INDEX: 23.03 KG/M2 | WEIGHT: 114 LBS | SYSTOLIC BLOOD PRESSURE: 120 MMHG

## 2019-01-08 DIAGNOSIS — Z34.93 PRENATAL CARE IN THIRD TRIMESTER: Primary | ICD-10-CM

## 2019-01-08 DIAGNOSIS — B37.31 VULVOVAGINAL CANDIDIASIS: ICD-10-CM

## 2019-01-08 DIAGNOSIS — O24.419 GESTATIONAL DIABETES MELLITUS, CLASS A2: ICD-10-CM

## 2019-01-08 PROCEDURE — 99214 OFFICE O/P EST MOD 30 MIN: CPT | Performed by: OBSTETRICS & GYNECOLOGY

## 2019-01-08 RX ORDER — GLYBURIDE 2.5 MG/1
2.5 TABLET ORAL NIGHTLY
Qty: 30 TABLET | Refills: 5 | Status: SHIPPED | OUTPATIENT
Start: 2019-01-08 | End: 2019-02-14 | Stop reason: HOSPADM

## 2019-01-08 NOTE — PROGRESS NOTES
Chief Complaint   Patient presents with   • Pregnancy Ultrasound     Growth scan done today for Gestational Diabetes, C/O green discharge with blood x 2 days and patient states it is getting worse.        HPI:   Cortney is a  currently at 32w5d who today reports the following:  Contractions - YES - but less than 4/hour AND no associated change in vaginal discharge; Leaking - No; Vaginal bleeding -  improved as compared to the prior visit; Swelling of extremities - No. Good fetal movement - YES.    + vaginal discharge, green, small amount of blood, mild pruritus    ROS:  GI: Nausea - No; Constipation - No; Diarrhea - No. RUQ pain - No    Neuro: Headache - No; Visual disturbances - No.    Pertinent items are noted in HPI, all other systems reviewed and negative    Review of History:  The following portions of the patient's history were reviewed and updated as appropriate:problem list, current medications, allergies, past family history, past medical history, past social history and past surgical history.    Current Outpatient Medications on File Prior to Visit   Medication Sig Dispense Refill   • Blood Glucose Monitoring Suppl (ONE TOUCH ULTRA 2) w/Device kit USE TO CHECK BLOOD GLUCOSE ONCE DAILY OR AS DIRECTED  0   • doxylamine (UNISOM) 25 MG tablet Take 1 tablet by mouth Every Night. 30 tablet 5   • ferrous sulfate 325 (65 FE) MG tablet Take 1 tablet by mouth 2 (Two) Times a Day Before Meals. 60 tablet 6   • glucose blood (IGLUCOSE TEST STRIPS) test strip Use as instructed 100 each 12   • glyBURIDE (DIABETA) 2.5 MG tablet Take 1 tablet by mouth Daily With Breakfast. 30 tablet 5   • Lancets (FREESTYLE) lancets Use as directed by your physician 100 each 12   • Prenatal Vit-Fe Fumarate-FA (PRENATAL VITAMINS) 28-0.8 MG tablet Take 1 tablet by mouth Daily. 90 tablet 3   • psyllium (METAMUCIL SMOOTH TEXTURE) 28 % packet Take 1 packet by mouth Daily. 30 each 11   • terconazole (TERAZOL 7) 0.4 % vaginal cream INSERT 1  APPLICATORFUL INTO THE VAGINA EVERY NIGHT FOR 7 DAYS  3   • vitamin B-6 (PYRIDOXINE) 25 MG tablet Take 1 tablet by mouth every night at bedtime. 30 tablet 5   • docusate sodium (COLACE) 100 MG capsule Take 1 capsule by mouth 2 (Two) Times a Day. 60 capsule 3   • hydrOXYzine (ATARAX) 25 MG tablet Take 1 tablet by mouth At Night As Needed for Itching. 20 tablet 0   • ondansetron (ZOFRAN) 4 MG tablet Take 1 tablet by mouth Every 6 (Six) Hours As Needed for Nausea or Vomiting. 20 tablet 3   • [DISCONTINUED] guaiFENesin (MUCINEX) 600 MG 12 hr tablet Take 1 tablet by mouth 2 (Two) Times a Day. 30 tablet 0     No current facility-administered medications on file prior to visit.        EXAM:  /72   Wt 51.7 kg (114 lb)   LMP 2018   BMI 23.03 kg/m²     Gen: NAD, conversant  Pulm: No use of accessory muscles, normal respirations  Abdomen: Gravid, nontender, size = dates, + fetal cardiac activity  Ext: no edema, no rashes, WWP  Gait: normal for pregnancy  Psych: Mood, insight, judgement intact  SVE: 0.5/0/-3 - thick white discharge coating vaginal walls, erythematous vulva    Lab Results   Component Value Date    ABORH B Rh Positive 2014    ABO B 2018    RH Positive 2018    ABSCRN Negative 2018       Social History    Tobacco Use      Smoking status: Never Smoker      Smokeless tobacco: Never Used      I have reviewed the prenatal labs and previous ultrasounds today.    MDM:  Diagnosis: Supervision of high risk pregnancy  DM - GDMA2  Previous C/S with planned repeat C/S   Desires tubal sterilization  Recurrent vulvovaginal candidiasis   Tests/Orders/Rx today: Orders Placed This Encounter   Procedures   • US Fetal Biophysical Profile;Without Non-Stress Testing     Order Specific Question:   Reason for Exam:     Answer:   GDM     IUP at 32+5 weeks. BPP . Cephalic presentation.    Meds Ordered: terconazole   Topics discussed: kick counts and fetal movement  PIH precautions   labor  signs and symptoms   Tubal papers signed previously  A2DM - Glyburide at night, continue glucose logs, return in 1 week for glucose review   Tests next visit: BPP  NST   Next visit: 1 week(s)     Juan Meza MD  Obstetrics and Gynecology  [facility]

## 2019-01-08 NOTE — DISCHARGE INSTRUCTIONS
Please get green fluid checked out.  Low grade temp noted.  No change in cervical exam.  Some uterine tenderness noted.  Amnisure negative.

## 2019-01-08 NOTE — NON STRESS TEST
"Triage Note - Nursing Documentation  Labor and Delivery Admission Log    Cortney Patsor  : 1995  MRN: [id number]  CSN: [id number]    Date in / Time in:  2019  Time in:     Date out / Time out:    Time out:     Nurse: Dedra Daigle RN    Patient Info: She is a 23 y.o. year old  at 32w4d with an ELYSSA of 2019, by Ultrasound who was seen on the [facility].    Chief Complaint:   Chief Complaint   Patient presents with   • Vaginal Bleeding     Vaginal bleeding since 1600 today. Pt states it was not a lot. Pain in pelvic area, back and legs.       Provider Instructions / Disposition: Pt arrived with complaints of contraction pain, almost constant green vaginal discharge and vaginal bleeding.  Pt states she is \"Going through a pad every hour or two during the day since the discharge started awhile ago.\"  Pt denies explanation of what \"awhile\" actually is in terms of time.  No vaginal bleeding noted since arrival.  Small spot of blood noted on her stephanie pad with a picture and was noted by YANA Perez per Sanam Ordoñez's report.  SVE done per YANA Perez. Amnisure done and was negative. SVE repeated prior to discharge.  No change from earlier exam.  Abdomen palpates hot.  Temporal temp done. 99.2.  Oral temp 99.1 upon admission.  Pt instructed to watch temp through the night, remain on bedrest, no tub baths tonight and is scheduled for an ultrasound for weight and fluid check and an appointment with Dr Meza in the morning starting at 8am.      Patient Active Problem List   Diagnosis   • HPV in female   • Epigastric pain   • Left upper quadrant pain   • Nausea   • Heartburn   • Constipation   • Ovarian cyst   • Hx of  section   • Rubella non-immune status, antepartum   • Vulvovaginal candidiasis   • Gestational diabetes mellitus, class A2       NST Documentation: REACTIVE - Sanam Ordoñez RN  "

## 2019-01-11 ENCOUNTER — HOSPITAL ENCOUNTER (OUTPATIENT)
Facility: HOSPITAL | Age: 24
Discharge: HOME | End: 2019-01-12
Attending: NURSE PRACTITIONER | Admitting: NURSE PRACTITIONER

## 2019-01-11 ENCOUNTER — HOSPITAL ENCOUNTER (OUTPATIENT)
Dept: LABOR AND DELIVERY | Facility: HOSPITAL | Age: 24
Discharge: HOME | End: 2019-01-11

## 2019-01-11 LAB
A VAGINAE DNA VAG QL NAA+PROBE: ABNORMAL SCORE
AMORPH URATE CRY URNS QL MICRO: ABNORMAL /HPF
BACTERIA UR QL AUTO: ABNORMAL /HPF
BACTERIA UR QL AUTO: ABNORMAL /HPF
BILIRUB BLD-MCNC: NEGATIVE MG/DL
BILIRUB UR QL STRIP: NEGATIVE
BILIRUB UR QL STRIP: NEGATIVE
BVAB2 DNA VAG QL NAA+PROBE: ABNORMAL SCORE
C ALBICANS DNA VAG QL NAA+PROBE: POSITIVE
C GLABRATA DNA VAG QL NAA+PROBE: NEGATIVE
C TRACH RRNA SPEC QL NAA+PROBE: NEGATIVE
CLARITY UR: ABNORMAL
CLARITY UR: CLEAR
CLARITY, POC: ABNORMAL
COLOR UR: ABNORMAL
COLOR UR: YELLOW
COLOR UR: YELLOW
GLUCOSE BLDC GLUCOMTR-MCNC: 107 MG/DL (ref 70–130)
GLUCOSE BLDC GLUCOMTR-MCNC: 115 MG/DL (ref 70–130)
GLUCOSE BLDC GLUCOMTR-MCNC: 72 MG/DL (ref 70–130)
GLUCOSE UR STRIP-MCNC: NEGATIVE MG/DL
HGB UR QL STRIP.AUTO: ABNORMAL
HGB UR QL STRIP.AUTO: ABNORMAL
HYALINE CASTS UR QL AUTO: ABNORMAL /LPF
HYALINE CASTS UR QL AUTO: ABNORMAL /LPF
KETONES UR QL STRIP: NEGATIVE
KETONES UR QL STRIP: NEGATIVE
KETONES UR QL: NEGATIVE
LEUKOCYTE EST, POC: ABNORMAL
LEUKOCYTE ESTERASE UR QL STRIP.AUTO: ABNORMAL
LEUKOCYTE ESTERASE UR QL STRIP.AUTO: NEGATIVE
MEGA1 DNA VAG QL NAA+PROBE: ABNORMAL SCORE
MUCOUS THREADS URNS QL MICRO: ABNORMAL /HPF
N GONORRHOEA RRNA SPEC QL NAA+PROBE: NEGATIVE
NITRITE UR QL STRIP: NEGATIVE
NITRITE UR QL STRIP: NEGATIVE
NITRITE UR-MCNC: NEGATIVE MG/ML
PH UR STRIP.AUTO: 7.5 [PH] (ref 5–8)
PH UR STRIP.AUTO: 8.5 [PH] (ref 5–8)
PH UR: 6 [PH] (ref 5–8)
PROT UR QL STRIP: ABNORMAL
PROT UR QL STRIP: NEGATIVE
PROT UR STRIP-MCNC: ABNORMAL MG/DL
RBC # UR STRIP: NEGATIVE /UL
RBC # UR: ABNORMAL /HPF
RBC # UR: ABNORMAL /HPF
REF LAB TEST METHOD: ABNORMAL
REF LAB TEST METHOD: ABNORMAL
SP GR UR STRIP: 1.01 (ref 1–1.03)
SP GR UR STRIP: 1.02 (ref 1–1.03)
SP GR UR: 1.01 (ref 1–1.03)
SQUAMOUS #/AREA URNS HPF: ABNORMAL /HPF
SQUAMOUS #/AREA URNS HPF: ABNORMAL /HPF
T VAGINALIS RRNA SPEC QL NAA+PROBE: NEGATIVE
TRANS CELLS #/AREA URNS HPF: ABNORMAL /HPF
UROBILINOGEN UR QL STRIP: ABNORMAL
UROBILINOGEN UR QL STRIP: ABNORMAL
UROBILINOGEN UR QL: NORMAL
WBC UR QL AUTO: ABNORMAL /HPF
WBC UR QL AUTO: ABNORMAL /HPF

## 2019-01-11 PROCEDURE — 82962 GLUCOSE BLOOD TEST: CPT

## 2019-01-11 PROCEDURE — 81001 URINALYSIS AUTO W/SCOPE: CPT | Performed by: NURSE PRACTITIONER

## 2019-01-11 PROCEDURE — 80306 DRUG TEST PRSMV INSTRMNT: CPT | Performed by: NURSE PRACTITIONER

## 2019-01-11 PROCEDURE — 87086 URINE CULTURE/COLONY COUNT: CPT | Performed by: NURSE PRACTITIONER

## 2019-01-11 PROCEDURE — 59025 FETAL NON-STRESS TEST: CPT

## 2019-01-11 PROCEDURE — 96361 HYDRATE IV INFUSION ADD-ON: CPT

## 2019-01-11 PROCEDURE — G0463 HOSPITAL OUTPT CLINIC VISIT: HCPCS

## 2019-01-11 PROCEDURE — 99214 OFFICE O/P EST MOD 30 MIN: CPT | Performed by: NURSE PRACTITIONER

## 2019-01-11 PROCEDURE — 81002 URINALYSIS NONAUTO W/O SCOPE: CPT | Performed by: NURSE PRACTITIONER

## 2019-01-11 RX ORDER — HYDROXYZINE PAMOATE 50 MG/1
50 CAPSULE ORAL ONCE
Status: COMPLETED | OUTPATIENT
Start: 2019-01-11 | End: 2019-01-11

## 2019-01-11 RX ORDER — FAMOTIDINE 20 MG/1
20 TABLET, FILM COATED ORAL DAILY
Status: DISCONTINUED | OUTPATIENT
Start: 2019-01-11 | End: 2019-01-12 | Stop reason: HOSPADM

## 2019-01-11 RX ORDER — NIFEDIPINE 10 MG/1
10 CAPSULE ORAL ONCE
Status: COMPLETED | OUTPATIENT
Start: 2019-01-11 | End: 2019-01-11

## 2019-01-11 RX ORDER — SODIUM CHLORIDE, SODIUM LACTATE, POTASSIUM CHLORIDE, CALCIUM CHLORIDE 600; 310; 30; 20 MG/100ML; MG/100ML; MG/100ML; MG/100ML
125 INJECTION, SOLUTION INTRAVENOUS CONTINUOUS
Status: DISCONTINUED | OUTPATIENT
Start: 2019-01-11 | End: 2019-01-12 | Stop reason: HOSPADM

## 2019-01-11 RX ADMIN — NIFEDIPINE 10 MG: 10 CAPSULE, LIQUID FILLED ORAL at 14:40

## 2019-01-11 RX ADMIN — SODIUM CHLORIDE, POTASSIUM CHLORIDE, SODIUM LACTATE AND CALCIUM CHLORIDE 999 ML/HR: 600; 310; 30; 20 INJECTION, SOLUTION INTRAVENOUS at 23:35

## 2019-01-11 RX ADMIN — SODIUM CHLORIDE, POTASSIUM CHLORIDE, SODIUM LACTATE AND CALCIUM CHLORIDE 125 ML/HR: 600; 310; 30; 20 INJECTION, SOLUTION INTRAVENOUS at 13:49

## 2019-01-11 RX ADMIN — HYDROXYZINE PAMOATE 50 MG: 50 CAPSULE ORAL at 20:39

## 2019-01-11 RX ADMIN — NIFEDIPINE 10 MG: 10 CAPSULE, LIQUID FILLED ORAL at 22:54

## 2019-01-11 RX ADMIN — FAMOTIDINE 20 MG: 20 TABLET ORAL at 14:18

## 2019-01-11 NOTE — H&P
Sudheer  Cortney Pastor  : 1995  MRN: [id number]  CSN: [id number]    Chief Complain:  NST and painful contractions       History and Physical    Cortney Pastor is a 23 y.o. year old  with an Estimated Date of Delivery: 19 currently at 33w1d.  RN reported pt arrived for NST - she also was complaining of painful contractions and low back pain.   IVF's bolus was ordered.     She is presently complaining of heartburn     Prenatal care has been with [facility].   Prenatal course has been complicated by GDM - non-compliant re: recording BS's or seeing dietician.       Pt states she has not eaten or drank anything this AM - random / FBS was taken 79.  She states she takes glyburide 2.5 mg po in the evening.     With review of her BS's - it was unable to verbalize specific BS's - she states they are up and down - she did not bring any recording of BS's in.      OB hx: term x 2 - C/S's  No hx of GDM    Obstetric History       T2      L2     SAB0   TAB0   Ectopic0   Molar0   Multiple0   Live Births2       # Outcome Date GA Lbr Flex/2nd Weight Sex Delivery Anes PTL Lv   3 Current            2 Term 2016 38w0d  3175 g (7 lb) F CS-LTranv   MUKESH      Complications: History of blood loss   1 Term 02/01/15   3175 g (7 lb) F Vag-Forceps EPI  MUKESH        Past Medical History:   Diagnosis Date   • Abnormal Pap smear of cervix 2015    ASCUS +HPV   • Anxiety and depression    • Body piercing     LOWER LIP, TOP OF LEFT EAR   • Constipation    • Depression    • GERD (gastroesophageal reflux disease)    • Hearing loss     REPORTS USES NO HEARING DEVICES   • History of blood transfusion 2016    REPORTS NO REACTION   • History of Papanicolaou smear of cervix 2016    NORMAL    • HPV in female    • Hypoglycemia    • Migraine    • Ovarian cyst 2017    RIGHT    • Rubella non-immune status, antepartum 2018   • Tattoos     1 ON LEFT WRIST, BILATERAL SHOULDER  BLADES, LEFT LEG     Past Surgical History:   Procedure Laterality Date   •  SECTION  2016    DR MARC SALINAS   • EAR TUBES         Review of Systems        Pertinent items are noted in HPI, all other systems reviewed and negative  Allergies   Allergen Reactions   • Latex Hives     Social History    Tobacco Use      Smoking status: Never Smoker      Smokeless tobacco: Never Used      /70 (BP Location: Right arm, Patient Position: Lying)   Pulse 103   Temp 98 °F (36.7 °C) (Oral)   Resp 16   Wt 51.7 kg (114 lb)   LMP 2018   SpO2 100%   BMI 23.03 kg/m²     Physical Exam       Psych: Altert and oriented to time, place and person  Mood and affect appropriate   General: well developed; well nourished  no acute distress  Musculoskeletal: Normal gait  Full range of motion  Heart: regular rate and rhythm, no murmur  Lungs:  breathing is unlabored  clear to auscultation bilaterally  Back: Negative CVAT  Abdomen: Gravid - soft and non-tender   Contractions every 2-4 min   FHT's   Lower Extremities: LE: Neg edema and DTR's 2+   V/E:  FT outer os /50% soft                      Prenatal Labs  Lab Results   Component Value Date    HGB 10.1 (L) 2018    HEPBSAG Negative 2018    ABORH B Rh Positive 2014    ABSCRN Negative 2018    [id number] Non Reactive 2018    HEPCVIRUSABY 0.1 2018    URINECX No growth 2018       Current Labs Reviewed   RBS     Assessment:    1. IUP at 33w1d  2. + GDM   3.  Previous C/S   4.   contractions   5.  FHT's reassuring   6.  Heart burn     Plan:  RBS  CC u/a to lab    IVF's bolus    Zantac   May have lunch ADA diet & do 1 hr PP   Reviewed importance of adeq FM / kick counts   To record FBS and 1 hr PP with B/L\D  - continue glyburide as prescribed  Will reevaluate after bolus IVF's and u/a results       Kathrin Lopez CNM  2019  1:56 PM

## 2019-01-11 NOTE — PROGRESS NOTES
Post IVF's, Nifedipine, lunch,   RN reported:  Palpated abdomen for approx 15 min.  5 ctx palpated at 2-8 minutes apart. Pt complains of discomfort during ctx.  VE performed, no change from prior VE.  Pt denies any symptoms of UTI.  1 hr    I Also noted  VSS - AF  U/A - suspected contaminate specimen  FHT's 145 + accels and variability  Plan:  Suggested vistaril 50 mg - Will consult with Dr. Martel for co-management plan of care.   Agrees with option for vistaril - considering no cervical change over 2 hrs - agree's pt may be discharged with S/S PTL   Continue to note good FM /kick counts -   TC back from RN - pt does not want vistaril and does not want to go home.  Inform will watch for another 4 hrs   Informed RN - Pt Does need I&O cath or a good CC u/a to r/o UTI

## 2019-01-11 NOTE — NURSING NOTE
APRN informed that Pt declines vistaril, and desires to stay and be monitored.  APRN states that we can watch an additional 4 hours. Obtain in/out cath for urine specimen.  
Call received from APRN. States that pt has been cleared by  to be discharged home. APRN states to offer vistaril if pt desires, and to see if pt feels ok to be discharged home. Keep follow up appointment on Monday.  
Call to APRN to update on pt status: Palpated abdomen for   approx 15 min.  5 ctx palpated at 2-8 minutes apart. Pt complains of discomfort during ctx.  VE performed, no change from prior VE.  Pt denies any symptoms of UTI.  APRN states she will contact MD and discuss, and return call.   
80

## 2019-01-12 VITALS
SYSTOLIC BLOOD PRESSURE: 91 MMHG | DIASTOLIC BLOOD PRESSURE: 52 MMHG | HEART RATE: 95 BPM | OXYGEN SATURATION: 100 % | TEMPERATURE: 98.2 F | RESPIRATION RATE: 16 BRPM | BODY MASS INDEX: 23.03 KG/M2 | WEIGHT: 114 LBS

## 2019-01-12 LAB
AMPHET+METHAMPHET UR QL: NEGATIVE
AMPHETAMINES UR QL: NEGATIVE
ANISOCYTOSIS BLD QL: NORMAL
BARBITURATES UR QL SCN: NEGATIVE
BASOPHILS # BLD AUTO: 0.01 10*3/MM3 (ref 0–0.2)
BASOPHILS NFR BLD AUTO: 0.1 % (ref 0–2.5)
BENZODIAZ UR QL SCN: NEGATIVE
BUPRENORPHINE SERPL-MCNC: NEGATIVE NG/ML
CANNABINOIDS SERPL QL: NEGATIVE
COCAINE UR QL: NEGATIVE
DEPRECATED RDW RBC AUTO: 41.5 FL (ref 37–54)
EOSINOPHIL # BLD AUTO: 0.05 10*3/MM3 (ref 0–0.7)
EOSINOPHIL NFR BLD AUTO: 0.4 % (ref 0–7)
ERYTHROCYTE [DISTWIDTH] IN BLOOD BY AUTOMATED COUNT: 15.7 % (ref 11.5–14.5)
GLUCOSE BLDC GLUCOMTR-MCNC: 105 MG/DL (ref 70–130)
HCT VFR BLD AUTO: 32.5 % (ref 37–47)
HGB BLD-MCNC: 9.9 G/DL (ref 12–16)
HYPOCHROMIA BLD QL: NORMAL
IMM GRANULOCYTES # BLD AUTO: 0.09 10*3/MM3 (ref 0–0.06)
IMM GRANULOCYTES NFR BLD AUTO: 0.6 % (ref 0–0.6)
LYMPHOCYTES # BLD AUTO: 1.5 10*3/MM3 (ref 0.6–3.4)
LYMPHOCYTES NFR BLD AUTO: 10.7 % (ref 10–50)
MCH RBC QN AUTO: 22.4 PG (ref 27–31)
MCHC RBC AUTO-ENTMCNC: 30.5 G/DL (ref 30–37)
MCV RBC AUTO: 73.7 FL (ref 81–99)
METHADONE UR QL SCN: NEGATIVE
MICROCYTES BLD QL: NORMAL
MONOCYTES # BLD AUTO: 0.55 10*3/MM3 (ref 0–0.9)
MONOCYTES NFR BLD AUTO: 3.9 % (ref 0–12)
NEUTROPHILS # BLD AUTO: 11.81 10*3/MM3 (ref 2–6.9)
NEUTROPHILS NFR BLD AUTO: 84.3 % (ref 37–80)
NRBC BLD AUTO-RTO: 0 /100 WBC (ref 0–0)
OPIATES UR QL: NEGATIVE
OXYCODONE UR QL SCN: NEGATIVE
PCP UR QL SCN: NEGATIVE
PLATELET # BLD AUTO: 178 10*3/MM3 (ref 130–400)
PMV BLD AUTO: 10.7 FL (ref 6–12)
PROPOXYPH UR QL: NEGATIVE
RBC # BLD AUTO: 4.41 10*6/MM3 (ref 4.2–5.4)
SMALL PLATELETS BLD QL SMEAR: ADEQUATE
TRICYCLICS UR QL SCN: NEGATIVE
WBC MORPH BLD: NORMAL
WBC NRBC COR # BLD: 14.01 10*3/MM3 (ref 4.8–10.8)

## 2019-01-12 PROCEDURE — 85007 BL SMEAR W/DIFF WBC COUNT: CPT | Performed by: NURSE PRACTITIONER

## 2019-01-12 PROCEDURE — 96361 HYDRATE IV INFUSION ADD-ON: CPT

## 2019-01-12 PROCEDURE — 25010000002 MORPHINE PER 10 MG: Performed by: NURSE PRACTITIONER

## 2019-01-12 PROCEDURE — 59025 FETAL NON-STRESS TEST: CPT

## 2019-01-12 PROCEDURE — 96374 THER/PROPH/DIAG INJ IV PUSH: CPT

## 2019-01-12 PROCEDURE — 85025 COMPLETE CBC W/AUTO DIFF WBC: CPT | Performed by: NURSE PRACTITIONER

## 2019-01-12 PROCEDURE — 99214 OFFICE O/P EST MOD 30 MIN: CPT | Performed by: NURSE PRACTITIONER

## 2019-01-12 PROCEDURE — 59025 FETAL NON-STRESS TEST: CPT | Performed by: NURSE PRACTITIONER

## 2019-01-12 PROCEDURE — 36415 COLL VENOUS BLD VENIPUNCTURE: CPT | Performed by: NURSE PRACTITIONER

## 2019-01-12 RX ORDER — MORPHINE SULFATE 2 MG/ML
4 INJECTION, SOLUTION INTRAMUSCULAR; INTRAVENOUS ONCE
Status: COMPLETED | OUTPATIENT
Start: 2019-01-12 | End: 2019-01-12

## 2019-01-12 RX ORDER — FLUCONAZOLE 100 MG/1
100 TABLET ORAL EVERY 24 HOURS
Status: COMPLETED | OUTPATIENT
Start: 2019-01-12 | End: 2019-01-12

## 2019-01-12 RX ADMIN — MORPHINE SULFATE 4 MG: 2 INJECTION, SOLUTION INTRAMUSCULAR; INTRAVENOUS at 00:33

## 2019-01-12 RX ADMIN — SODIUM CHLORIDE, POTASSIUM CHLORIDE, SODIUM LACTATE AND CALCIUM CHLORIDE 125 ML/HR: 600; 310; 30; 20 INJECTION, SOLUTION INTRAVENOUS at 00:33

## 2019-01-12 RX ADMIN — FLUCONAZOLE 100 MG: 100 TABLET ORAL at 10:50

## 2019-01-12 NOTE — SIGNIFICANT NOTE
MD Martel at bedside, addressed pt's concern of yeast infection.  Verbally ordered 150 mg diflucan for pt.

## 2019-01-12 NOTE — SIGNIFICANT NOTE
Crow at bedside, said to re-check pt's blood sugar one hour post prandial and she will be back to assess pt.

## 2019-01-12 NOTE — PROGRESS NOTES
[facility]  Obstetric Progress Note    Late entry for 1- 2100  TC per RN - pt up out of bed - angry - throwing EFM toco's and pt was threatening to leave - stated no one was helping - she still was having abdominal pain.  I spoke with pt - informed I would be in to see her.  Upon arrival - pt had calmed down - RN reported pt had verbalized hx of anger issues - has been in-patient at professional facility.      Subjective     Patient:    The patient reports she is still having painful contractions - also pain in hips as well as low back pain.  Pain with fetal activity.  She denies vaginal bleeding or fluid leaking   She verbalized she has been upset - no one has helped her with the discomfort - pain - contractions.  She states she has not eaten.  She stated it was time to take her glyburide.   She stated the nurse has helped her to calm down some - she states she didn't mean to get upset.            Objective     Vital Signs Range for the last 24 hours  Temp:  [98 °F (36.7 °C)-98.2 °F (36.8 °C)] 98.2 °F (36.8 °C)   Temp src: Oral   BP: ()/(52-82) 91/52   Heart Rate:  [] 95   Resp:  [16] 16   SpO2:  [100 %] 100 %     General: Alert - responds to questions appropriately.  No obvious pain though verbalized discomfort    Lungs: unlabored  Abdomen gravid - S=D, soft and non-tender, +FM noted EFM irregular contractions   LE: neg calf tenderness  V/E earlier - no cervical change  - no vaginal bleeding   Psych: normal mood at present - smiling - verbalized upset though no obvious inappropriate behavior at present            Assessment:  As previously written &:   1.  Anger now and hx of anger issues  2.  BS's normal   3.  Contractions without cervical               changes.    4.  General discomforts of pregnancy  5.  FHT's reassuring         Plan:  Healthy meal to be provided now  1 hr PP  Option / Encouraged vistaril - does not want  Option for nifedipine - declined  Comfort measures   Hold glyburide  tonight   Informed u/a neg for S/S UTI  D/C home after 1 hr PP  Return pain worsens / bleeding or fluid leaking   Pelvic rest  Continue to note good FM   Office Monday       Total time spent today with Cortney was 40 min. Greater than 90% of the time was spent face to face to reassure patient, review normal discomforts of pregnancy & comfort measures, BH's vs true  labor - options available.  We also reviewed diet - counseling with nutritionist & importance - informed BS's have been normal and will hold glyburide this evening.  I also provided counseling related to anger issues.  Her father and FOB in attendance and encouraged all questions and answered.  Reassured re: d/c home.                                        Kathrin Lopez CNM  2019  2:16 PM

## 2019-01-12 NOTE — PROGRESS NOTES
BRYAN [facility]  Obstetric Progress Note    Subjective     Patient:    The patient feels she is still having pain with her contractions. She had spotting after exam last night and does not want anymore exams. She stated she tried vistaril last night and she also received morphine - she was unable to get any rest due to the painful contractions.  She also states she keeps the yeast infection - she is aware of diflucan but insurance will not cover.   Good FM    Objective     Vital Signs Range for the last 24 hours  Temp:  [98 °F (36.7 °C)-98.2 °F (36.8 °C)] 98.2 °F (36.8 °C)   Temp src: Oral   BP: ()/(52-74) 91/52   Heart Rate:  [] 95   Resp:  [16] 16   SpO2:  [100 %] 100 %     FBS wnl this AM              Physical exam  General: Pt is sitting up in bed eating breakfast  She is alert. No apparent distress.  Lungs: unlabored  Abdomen is soft and non-tender   Sporadic contractions through EFM strip   FHT's with variability and accels -   Extremities: full ROM   V/E deferred       Assessment:  1.  As previously written       Plan:  1. 1 hr PP   2. D/C home with instructions to continue to note good FM  3.  S/S PTL  4.  Continue to check BS's           Noted:  Dr. Martel in to see pt as pt refused discharge last night secondary to pain.  Instructed pt to hold glyburide  Diflucan now   Home   Office Monday with BS's recorded                            Kathrin Lopez CNM  1/12/2019  3:03 PM

## 2019-01-12 NOTE — SIGNIFICANT NOTE
MD Martel at bedside going over poc with pt  Discharging pt, told her to discontinue glyburide and monitor blood sugars, follow up in office on Monday.  Give pt 150mg diflucan before discharge.

## 2019-01-12 NOTE — NON STRESS TEST
Triage Note - Nursing Documentation  Labor and Delivery Admission Log    Cortney Pastor  : 1995  MRN: [id number]  CSN: [id number]    Date in / Time in:  2019  Time in: 1245    Date out / Time out:    Time out: 1057    Nurse: Stephanie Stafford RN    Patient Info: She is a 23 y.o. year old  at 33w2d with an ELYSSA of 2019, by Ultrasound who was seen on the [facility] [place].    Chief Complaint:   Chief Complaint   Patient presents with   • Non-stress Test     GDM       Provider Instructions / Disposition: pt arrived for  contractions, EFM, IV/PO hydration, VE performed 0-1/0%, diflucan 150mg given to pt. Pt educated on discharge instructions and told to call and be seen in office on Monday.    Patient Active Problem List   Diagnosis   • HPV in female   • Epigastric pain   • Left upper quadrant pain   • Nausea   • Heartburn   • Constipation   • Ovarian cyst   • Hx of  section   • Rubella non-immune status, antepartum   • Vulvovaginal candidiasis   • Gestational diabetes mellitus, class A2       NST Documentation (Only applicable > 32 weeks):

## 2019-01-13 LAB — BACTERIA SPEC AEROBE CULT: NO GROWTH

## 2019-01-16 ENCOUNTER — PREP FOR SURGERY (OUTPATIENT)
Dept: OTHER | Facility: HOSPITAL | Age: 24
End: 2019-01-16

## 2019-01-16 ENCOUNTER — HOSPITAL ENCOUNTER (OUTPATIENT)
Facility: HOSPITAL | Age: 24
Setting detail: SURGERY ADMIT
Discharge: HOME | End: 2019-01-16
Attending: OBSTETRICS & GYNECOLOGY | Admitting: NURSE PRACTITIONER

## 2019-01-16 ENCOUNTER — ROUTINE PRENATAL (OUTPATIENT)
Dept: OBSTETRICS AND GYNECOLOGY | Facility: CLINIC | Age: 24
End: 2019-01-16

## 2019-01-16 VITALS — DIASTOLIC BLOOD PRESSURE: 72 MMHG | WEIGHT: 113 LBS | BODY MASS INDEX: 22.82 KG/M2 | SYSTOLIC BLOOD PRESSURE: 118 MMHG

## 2019-01-16 VITALS
HEART RATE: 93 BPM | TEMPERATURE: 97.7 F | OXYGEN SATURATION: 100 % | DIASTOLIC BLOOD PRESSURE: 56 MMHG | SYSTOLIC BLOOD PRESSURE: 103 MMHG

## 2019-01-16 DIAGNOSIS — Z30.2 ENCOUNTER FOR STERILIZATION: ICD-10-CM

## 2019-01-16 DIAGNOSIS — O24.419 GESTATIONAL DIABETES MELLITUS, CLASS A2: ICD-10-CM

## 2019-01-16 DIAGNOSIS — Z98.891 PREVIOUS CESAREAN SECTION: Primary | ICD-10-CM

## 2019-01-16 DIAGNOSIS — Z98.891 PREVIOUS CESAREAN SECTION: ICD-10-CM

## 2019-01-16 DIAGNOSIS — Z34.93 PRENATAL CARE IN THIRD TRIMESTER: Primary | ICD-10-CM

## 2019-01-16 DIAGNOSIS — B37.31 VULVOVAGINAL CANDIDIASIS: ICD-10-CM

## 2019-01-16 LAB
BILIRUB BLD-MCNC: NEGATIVE MG/DL
CLARITY, POC: CLEAR
COLOR UR: YELLOW
GLUCOSE UR STRIP-MCNC: NEGATIVE MG/DL
KETONES UR QL: NEGATIVE
LEUKOCYTE EST, POC: NEGATIVE
NITRITE UR-MCNC: NEGATIVE MG/ML
PH UR: 6.5 [PH] (ref 5–8)
PROT UR STRIP-MCNC: ABNORMAL MG/DL
RBC # UR STRIP: NEGATIVE /UL
SP GR UR: 1.01 (ref 1–1.03)
UROBILINOGEN UR QL: NORMAL

## 2019-01-16 PROCEDURE — 81002 URINALYSIS NONAUTO W/O SCOPE: CPT | Performed by: OBSTETRICS & GYNECOLOGY

## 2019-01-16 PROCEDURE — 59025 FETAL NON-STRESS TEST: CPT

## 2019-01-16 PROCEDURE — 99213 OFFICE O/P EST LOW 20 MIN: CPT | Performed by: OBSTETRICS & GYNECOLOGY

## 2019-01-16 PROCEDURE — G0463 HOSPITAL OUTPT CLINIC VISIT: HCPCS

## 2019-01-16 PROCEDURE — 59025 FETAL NON-STRESS TEST: CPT | Performed by: MIDWIFE

## 2019-01-16 RX ORDER — PROMETHAZINE HYDROCHLORIDE 25 MG/ML
25 INJECTION, SOLUTION INTRAMUSCULAR; INTRAVENOUS 4 TIMES DAILY PRN
Status: CANCELLED | OUTPATIENT
Start: 2019-01-16

## 2019-01-16 RX ORDER — ONDANSETRON 4 MG/1
4 TABLET, FILM COATED ORAL EVERY 6 HOURS PRN
Status: CANCELLED | OUTPATIENT
Start: 2019-01-16

## 2019-01-16 RX ORDER — SODIUM CHLORIDE 0.9 % (FLUSH) 0.9 %
1-10 SYRINGE (ML) INJECTION AS NEEDED
Status: CANCELLED | OUTPATIENT
Start: 2019-01-16

## 2019-01-16 RX ORDER — LIDOCAINE HYDROCHLORIDE 10 MG/ML
5 INJECTION, SOLUTION EPIDURAL; INFILTRATION; INTRACAUDAL; PERINEURAL AS NEEDED
Status: CANCELLED | OUTPATIENT
Start: 2019-01-16

## 2019-01-16 RX ORDER — SODIUM CHLORIDE 0.9 % (FLUSH) 0.9 %
3 SYRINGE (ML) INJECTION EVERY 12 HOURS SCHEDULED
Status: CANCELLED | OUTPATIENT
Start: 2019-01-16

## 2019-01-16 RX ORDER — METHYLERGONOVINE MALEATE 0.2 MG/ML
200 INJECTION INTRAVENOUS ONCE AS NEEDED
Status: CANCELLED | OUTPATIENT
Start: 2019-01-16

## 2019-01-16 RX ORDER — CARBOPROST TROMETHAMINE 250 UG/ML
250 INJECTION, SOLUTION INTRAMUSCULAR AS NEEDED
Status: CANCELLED | OUTPATIENT
Start: 2019-01-16

## 2019-01-16 RX ORDER — PROMETHAZINE HYDROCHLORIDE 12.5 MG/1
12.5 SUPPOSITORY RECTAL EVERY 6 HOURS PRN
Status: CANCELLED | OUTPATIENT
Start: 2019-01-16

## 2019-01-16 RX ORDER — TRISODIUM CITRATE DIHYDRATE AND CITRIC ACID MONOHYDRATE 500; 334 MG/5ML; MG/5ML
30 SOLUTION ORAL ONCE
Status: CANCELLED | OUTPATIENT
Start: 2019-01-16 | End: 2019-01-16

## 2019-01-16 RX ORDER — PROMETHAZINE HYDROCHLORIDE 25 MG/ML
12.5 INJECTION, SOLUTION INTRAMUSCULAR; INTRAVENOUS EVERY 4 HOURS PRN
Status: CANCELLED | OUTPATIENT
Start: 2019-01-16

## 2019-01-16 RX ORDER — MORPHINE SULFATE 2 MG/ML
6 INJECTION, SOLUTION INTRAMUSCULAR; INTRAVENOUS
Status: CANCELLED | OUTPATIENT
Start: 2019-01-16 | End: 2019-01-26

## 2019-01-16 RX ORDER — ONDANSETRON 4 MG/1
4 TABLET, ORALLY DISINTEGRATING ORAL EVERY 6 HOURS PRN
Status: CANCELLED | OUTPATIENT
Start: 2019-01-16

## 2019-01-16 RX ORDER — MISOPROSTOL 200 UG/1
800 TABLET ORAL AS NEEDED
Status: CANCELLED | OUTPATIENT
Start: 2019-01-16

## 2019-01-16 RX ORDER — FAMOTIDINE 10 MG/ML
20 INJECTION, SOLUTION INTRAVENOUS ONCE
Status: CANCELLED | OUTPATIENT
Start: 2019-01-16 | End: 2019-01-16

## 2019-01-16 RX ORDER — ONDANSETRON 2 MG/ML
4 INJECTION INTRAMUSCULAR; INTRAVENOUS EVERY 6 HOURS PRN
Status: CANCELLED | OUTPATIENT
Start: 2019-01-16

## 2019-01-16 RX ORDER — SODIUM CHLORIDE, SODIUM LACTATE, POTASSIUM CHLORIDE, CALCIUM CHLORIDE 600; 310; 30; 20 MG/100ML; MG/100ML; MG/100ML; MG/100ML
125 INJECTION, SOLUTION INTRAVENOUS CONTINUOUS
Status: CANCELLED | OUTPATIENT
Start: 2019-01-16

## 2019-01-16 RX ORDER — CEFAZOLIN SODIUM 2 G/50ML
2 SOLUTION INTRAVENOUS ONCE
Status: CANCELLED | OUTPATIENT
Start: 2019-01-16 | End: 2019-01-16

## 2019-01-16 RX ORDER — PROMETHAZINE HYDROCHLORIDE 12.5 MG/1
12.5 TABLET ORAL EVERY 6 HOURS PRN
Status: CANCELLED | OUTPATIENT
Start: 2019-01-16

## 2019-01-16 NOTE — PROGRESS NOTES
Chief Complaint   Patient presents with   • Pregnancy Ultrasound     BPP done today, C/O nausea, diarrhea, yellow discharge, and pressure       HPI:   Cortney is a  currently at 33w6d who today reports the following:  Contractions - YES - but less than 4/hour AND no associated change in vaginal discharge; Leaking - No; Vaginal bleeding -  No; Swelling of extremities - No. Good fetal movement - YES.    + vaginal discharge, green, small amount of blood, mild pruritus    ROS:  GI: Nausea - No; Constipation - No; Diarrhea - No. RUQ pain - No    Neuro: Headache - No; Visual disturbances - No.    Pertinent items are noted in HPI, all other systems reviewed and negative    Review of History:  The following portions of the patient's history were reviewed and updated as appropriate:problem list, current medications, allergies, past family history, past medical history, past social history and past surgical history.    Current Outpatient Medications on File Prior to Visit   Medication Sig Dispense Refill   • Blood Glucose Monitoring Suppl (ONE TOUCH ULTRA 2) w/Device kit USE TO CHECK BLOOD GLUCOSE ONCE DAILY OR AS DIRECTED  0   • docusate sodium (COLACE) 100 MG capsule Take 1 capsule by mouth 2 (Two) Times a Day. 60 capsule 3   • doxylamine (UNISOM) 25 MG tablet Take 1 tablet by mouth Every Night. 30 tablet 5   • ferrous sulfate 325 (65 FE) MG tablet Take 1 tablet by mouth 2 (Two) Times a Day Before Meals. 60 tablet 6   • glucose blood (IGLUCOSE TEST STRIPS) test strip Use as instructed 100 each 12   • glyBURIDE (DIABETA) 2.5 MG tablet Take 1 tablet by mouth Every Night. 30 tablet 5   • hydrOXYzine (ATARAX) 25 MG tablet Take 1 tablet by mouth At Night As Needed for Itching. 20 tablet 0   • Lancets (FREESTYLE) lancets Use as directed by your physician 100 each 12   • ondansetron (ZOFRAN) 4 MG tablet Take 1 tablet by mouth Every 6 (Six) Hours As Needed for Nausea or Vomiting. 20 tablet 3   • Prenatal Vit-Fe Fumarate-FA  (PRENATAL VITAMINS) 28-0.8 MG tablet Take 1 tablet by mouth Daily. 90 tablet 3   • psyllium (METAMUCIL SMOOTH TEXTURE) 28 % packet Take 1 packet by mouth Daily. 30 each 11   • terconazole (TERAZOL 7) 0.4 % vaginal cream Insert 1 applicator into the vagina nightly for 7 nights 45 g 3   • vitamin B-6 (PYRIDOXINE) 25 MG tablet Take 1 tablet by mouth every night at bedtime. 30 tablet 5     No current facility-administered medications on file prior to visit.        EXAM:  /72   Wt 51.3 kg (113 lb)   LMP 2018   BMI 22.82 kg/m²     Gen: NAD, conversant  Pulm: No use of accessory muscles, normal respirations  Abdomen: Gravid, nontender, size = dates, + fetal cardiac activity  Ext: no edema, no rashes, WWP  Gait: normal for pregnancy  Psych: Mood, insight, judgement intact  SVE: 0.5/0/-3 - thick white discharge coating vaginal walls, erythematous vulva    Lab Results   Component Value Date    ABORH B Rh Positive 2014    ABO B 2018    RH Positive 2018    ABSCRN Negative 2018       Social History    Tobacco Use      Smoking status: Never Smoker      Smokeless tobacco: Never Used      I have reviewed the prenatal labs and previous ultrasounds today.    MDM:  Diagnosis: Supervision of high risk pregnancy  DM - GDMA2  Previous C/S with planned repeat C/S   Desires tubal sterilization  Recurrent vulvovaginal candidiasis  Breech presentation   Tests/Orders/Rx today: Orders Placed This Encounter   Procedures   • US Fetal Biophysical Profile;Without Non-Stress Testing     Order Specific Question:   Reason for Exam:     Answer:   GDM     IUP at 33+6 weeks.  Breech presentation.  BPP .    Meds Ordered: terconazole prn   Topics discussed: kick counts and fetal movement  PIH precautions   labor signs and symptoms   Tubal papers signed previously  A2DM - Glyburide at night, continue glucose logs, return in 1 week for glucose review   Tests next visit: BPP  NST   Next visit: 1 week(s)      Juan Meza MD  Obstetrics and Gynecology  [facility]

## 2019-01-17 NOTE — NON STRESS TEST
Triage Note - Nursing Documentation  Labor and Delivery Admission Log    Cortney Pastor  : 1995  MRN: [id number]  CSN: [id number]    Date in / Time in:  19  Time in:0    Date out / Time out:  19  Time out:     Nurse: Cassy Ward RN    Patient Info: She is a 23 y.o. year old  at 34w0d with an ELYSSA of 2019, by Ultrasound who was seen on the [facility] [place].    Chief Complaint:   Chief Complaint   Patient presents with   • Abdominal Pain       Provider Instructions / Disposition: SVE reviewed with YANA Perez. No change noted since previous exam. Pt discharged to home. To return  at 2pm for NST. Pt agreeable with plan and knows to return to [facility] [place] for NST.  labor signs and symptoms reviewed with patient prior to discharge. Pt verbalized understanding.     Patient Active Problem List   Diagnosis   • HPV in female   • Epigastric pain   • Left upper quadrant pain   • Nausea   • Heartburn   • Constipation   • Ovarian cyst   • Hx of  section   • Rubella non-immune status, antepartum   • Vulvovaginal candidiasis   • Gestational diabetes mellitus, class A2   • Previous  section   • Encounter for sterilization       NST Documentation (Only applicable > 32 weeks):  Nonstress Test Interpretation: Reactive- Cassy Ward RN

## 2019-01-18 ENCOUNTER — HOSPITAL ENCOUNTER (OUTPATIENT)
Facility: HOSPITAL | Age: 24
Discharge: HOME | End: 2019-01-18
Attending: MIDWIFE | Admitting: MIDWIFE

## 2019-01-18 ENCOUNTER — HOSPITAL ENCOUNTER (OUTPATIENT)
Dept: LABOR AND DELIVERY | Facility: HOSPITAL | Age: 24
Discharge: HOME | End: 2019-01-18

## 2019-01-18 VITALS
HEART RATE: 95 BPM | DIASTOLIC BLOOD PRESSURE: 74 MMHG | HEIGHT: 59 IN | TEMPERATURE: 98.4 F | OXYGEN SATURATION: 100 % | SYSTOLIC BLOOD PRESSURE: 108 MMHG | RESPIRATION RATE: 18 BRPM | WEIGHT: 114 LBS | BODY MASS INDEX: 22.98 KG/M2

## 2019-01-18 LAB
BILIRUB BLD-MCNC: NEGATIVE MG/DL
CLARITY, POC: CLEAR
COLOR UR: YELLOW
GLUCOSE UR STRIP-MCNC: NEGATIVE MG/DL
KETONES UR QL: NEGATIVE
LEUKOCYTE EST, POC: NEGATIVE
NITRITE UR-MCNC: NEGATIVE MG/ML
PH UR: 7 [PH] (ref 5–8)
PROT UR STRIP-MCNC: NEGATIVE MG/DL
RBC # UR STRIP: NEGATIVE /UL
SP GR UR: 1.01 (ref 1–1.03)
UROBILINOGEN UR QL: NORMAL

## 2019-01-18 PROCEDURE — 81002 URINALYSIS NONAUTO W/O SCOPE: CPT | Performed by: MIDWIFE

## 2019-01-18 PROCEDURE — 59025 FETAL NON-STRESS TEST: CPT

## 2019-01-18 PROCEDURE — G0463 HOSPITAL OUTPT CLINIC VISIT: HCPCS

## 2019-01-18 NOTE — NON STRESS TEST
Triage Note - Nursing Documentation  Labor and Delivery Admission Log    Cortney Pastor  : 1995  MRN: [id number]  CSN: [id number]    Date in / Time in:  2019  Time in: 1353    Date out / Time out:    Time out: 7282    Nurse: Keyana Loaiza RN    Patient Info: She is a 23 y.o. year old  at 34w1d with an ELYSSA of 2019, by Ultrasound who was seen on the [facility].    Chief Complaint:   Chief Complaint   Patient presents with   • Non-stress Test     GDM       Provider Instructions / Disposition: EFM, NST, urine dipstick, VE done cervix unchanged.    Patient Active Problem List   Diagnosis   • HPV in female   • Epigastric pain   • Left upper quadrant pain   • Nausea   • Heartburn   • Constipation   • Ovarian cyst   • Hx of  section   • Rubella non-immune status, antepartum   • Vulvovaginal candidiasis   • Gestational diabetes mellitus, class A2   • Previous  section   • Encounter for sterilization       NST Documentation (Only applicable > 32 weeks): Interpretation A  Nonstress Test Interpretation A: Reactive (19 1410 : Keyana Loaiza, RN)  Comments A: Positive FM (19 1410 : Keyana Loaiza, RN)

## 2019-01-18 NOTE — DISCHARGE INSTRUCTIONS
Keep scheduled OB appointment.  Drink plenty of water 6-8 glasses per day.  Return to labor [place] if any problems arise.

## 2019-01-21 ENCOUNTER — HOSPITAL ENCOUNTER (OUTPATIENT)
Facility: HOSPITAL | Age: 24
Discharge: HOME | End: 2019-01-21
Attending: MIDWIFE | Admitting: MIDWIFE

## 2019-01-21 VITALS
WEIGHT: 114 LBS | RESPIRATION RATE: 18 BRPM | DIASTOLIC BLOOD PRESSURE: 57 MMHG | HEIGHT: 59 IN | HEART RATE: 95 BPM | BODY MASS INDEX: 22.98 KG/M2 | OXYGEN SATURATION: 100 % | SYSTOLIC BLOOD PRESSURE: 106 MMHG | TEMPERATURE: 98.6 F

## 2019-01-21 LAB
A1 MICROGLOB PLACENTAL VAG QL: NEGATIVE
BILIRUB BLD-MCNC: NEGATIVE MG/DL
CLARITY, POC: CLEAR
COLOR UR: ABNORMAL
GLUCOSE UR STRIP-MCNC: NEGATIVE MG/DL
KETONES UR QL: NEGATIVE
LEUKOCYTE EST, POC: NEGATIVE
NITRITE UR-MCNC: NEGATIVE MG/ML
PH UR: 7 [PH] (ref 5–8)
PROT UR STRIP-MCNC: NEGATIVE MG/DL
RBC # UR STRIP: NEGATIVE /UL
SP GR UR: 1 (ref 1–1.03)
UROBILINOGEN UR QL: NORMAL

## 2019-01-21 PROCEDURE — 59025 FETAL NON-STRESS TEST: CPT

## 2019-01-21 PROCEDURE — G0463 HOSPITAL OUTPT CLINIC VISIT: HCPCS

## 2019-01-21 PROCEDURE — 84112 EVAL AMNIOTIC FLUID PROTEIN: CPT | Performed by: MIDWIFE

## 2019-01-21 PROCEDURE — 59025 FETAL NON-STRESS TEST: CPT | Performed by: MIDWIFE

## 2019-01-21 PROCEDURE — 81002 URINALYSIS NONAUTO W/O SCOPE: CPT | Performed by: MIDWIFE

## 2019-01-21 RX ORDER — HYDROXYZINE PAMOATE 50 MG/1
50 CAPSULE ORAL ONCE
Status: COMPLETED | OUTPATIENT
Start: 2019-01-21 | End: 2019-01-21

## 2019-01-21 RX ADMIN — HYDROXYZINE PAMOATE 50 MG: 50 CAPSULE ORAL at 01:39

## 2019-01-21 NOTE — NON STRESS TEST
"Triage Note - Nursing Documentation  Labor and Delivery Admission Log    Cortney Pastor  : 1995  MRN: [id number]  CSN: [id number]    Date in / Time in:  2019  Time in:40      Date out / Time out:    Time out: 130      Nurse: Kathleen Childs RN    Patient Info: She is a 23 y.o. year old  at 34w4d with an ELYSSA of 2019, by Ultrasound who was seen on the [facility].    Chief Complaint:   Chief Complaint   Patient presents with   • Leaking Fluid     \" been leaking fluid for a week feeling crapy\"       Provider Instructions / Disposition:Informed APRN that pt is here complaining of leaking x's a week, amnisure was negative, no cervical change since last office visit. Received order to d/c to home may have a vistaril    Patient Active Problem List   Diagnosis   • HPV in female   • Epigastric pain   • Left upper quadrant pain   • Nausea   • Heartburn   • Constipation   • Ovarian cyst   • Hx of  section   • Rubella non-immune status, antepartum   • Vulvovaginal candidiasis   • Gestational diabetes mellitus, class A2   • Previous  section   • Encounter for sterilization       NST Documentation (Only applicable > 32 weeks):      "

## 2019-01-22 ENCOUNTER — TELEPHONE (OUTPATIENT)
Dept: OBSTETRICS AND GYNECOLOGY | Facility: CLINIC | Age: 24
End: 2019-01-22

## 2019-01-22 ENCOUNTER — HOSPITAL ENCOUNTER (OUTPATIENT)
Facility: HOSPITAL | Age: 24
Discharge: HOME | End: 2019-01-23
Attending: NURSE PRACTITIONER | Admitting: NURSE PRACTITIONER

## 2019-01-22 LAB
A1 MICROGLOB PLACENTAL VAG QL: NEGATIVE
BACTERIA UR QL AUTO: ABNORMAL /HPF
BILIRUB UR QL STRIP: ABNORMAL
CLARITY UR: CLEAR
COLOR UR: YELLOW
GLUCOSE BLDC GLUCOMTR-MCNC: 84 MG/DL (ref 70–130)
GLUCOSE BLDC GLUCOMTR-MCNC: 89 MG/DL (ref 70–130)
GLUCOSE UR STRIP-MCNC: NEGATIVE MG/DL
HGB UR QL STRIP.AUTO: NEGATIVE
HYALINE CASTS UR QL AUTO: ABNORMAL /LPF
KETONES UR QL STRIP: ABNORMAL
LEUKOCYTE ESTERASE UR QL STRIP.AUTO: NEGATIVE
MUCOUS THREADS URNS QL MICRO: ABNORMAL /HPF
NITRITE UR QL STRIP: NEGATIVE
PH UR STRIP.AUTO: 6 [PH] (ref 5–8)
PROT UR QL STRIP: ABNORMAL
RBC # UR: ABNORMAL /HPF
REF LAB TEST METHOD: ABNORMAL
SP GR UR STRIP: >=1.03 (ref 1–1.03)
SQUAMOUS #/AREA URNS HPF: ABNORMAL /HPF
UROBILINOGEN UR QL STRIP: ABNORMAL
WBC UR QL AUTO: ABNORMAL /HPF

## 2019-01-22 PROCEDURE — 96374 THER/PROPH/DIAG INJ IV PUSH: CPT

## 2019-01-22 PROCEDURE — 85027 COMPLETE CBC AUTOMATED: CPT | Performed by: NURSE PRACTITIONER

## 2019-01-22 PROCEDURE — 25010000002 ONDANSETRON PER 1 MG: Performed by: NURSE PRACTITIONER

## 2019-01-22 PROCEDURE — 82962 GLUCOSE BLOOD TEST: CPT

## 2019-01-22 PROCEDURE — G0463 HOSPITAL OUTPT CLINIC VISIT: HCPCS

## 2019-01-22 PROCEDURE — 96375 TX/PRO/DX INJ NEW DRUG ADDON: CPT

## 2019-01-22 PROCEDURE — 59025 FETAL NON-STRESS TEST: CPT

## 2019-01-22 PROCEDURE — 25010000002 PROMETHAZINE PER 50 MG: Performed by: NURSE PRACTITIONER

## 2019-01-22 PROCEDURE — 84112 EVAL AMNIOTIC FLUID PROTEIN: CPT | Performed by: NURSE PRACTITIONER

## 2019-01-22 PROCEDURE — 87086 URINE CULTURE/COLONY COUNT: CPT | Performed by: NURSE PRACTITIONER

## 2019-01-22 PROCEDURE — 80053 COMPREHEN METABOLIC PANEL: CPT | Performed by: NURSE PRACTITIONER

## 2019-01-22 PROCEDURE — 36415 COLL VENOUS BLD VENIPUNCTURE: CPT | Performed by: NURSE PRACTITIONER

## 2019-01-22 PROCEDURE — 96361 HYDRATE IV INFUSION ADD-ON: CPT

## 2019-01-22 PROCEDURE — 81001 URINALYSIS AUTO W/SCOPE: CPT | Performed by: NURSE PRACTITIONER

## 2019-01-22 RX ORDER — ONDANSETRON 2 MG/ML
4 INJECTION INTRAMUSCULAR; INTRAVENOUS EVERY 6 HOURS PRN
Status: DISCONTINUED | OUTPATIENT
Start: 2019-01-22 | End: 2019-01-23 | Stop reason: HOSPADM

## 2019-01-22 RX ORDER — PROMETHAZINE HYDROCHLORIDE 25 MG/ML
12.5 INJECTION, SOLUTION INTRAMUSCULAR; INTRAVENOUS EVERY 6 HOURS PRN
Status: DISCONTINUED | OUTPATIENT
Start: 2019-01-22 | End: 2019-01-23 | Stop reason: HOSPADM

## 2019-01-22 RX ORDER — SODIUM CHLORIDE, SODIUM LACTATE, POTASSIUM CHLORIDE, CALCIUM CHLORIDE 600; 310; 30; 20 MG/100ML; MG/100ML; MG/100ML; MG/100ML
125 INJECTION, SOLUTION INTRAVENOUS CONTINUOUS
Status: DISCONTINUED | OUTPATIENT
Start: 2019-01-22 | End: 2019-01-23 | Stop reason: HOSPADM

## 2019-01-22 RX ORDER — LOPERAMIDE HYDROCHLORIDE 2 MG/1
4 CAPSULE ORAL 4 TIMES DAILY PRN
Status: DISCONTINUED | OUTPATIENT
Start: 2019-01-22 | End: 2019-01-23 | Stop reason: HOSPADM

## 2019-01-22 RX ADMIN — LOPERAMIDE HYDROCHLORIDE 4 MG: 2 CAPSULE ORAL at 21:24

## 2019-01-22 RX ADMIN — ONDANSETRON 4 MG: 2 INJECTION INTRAMUSCULAR; INTRAVENOUS at 18:24

## 2019-01-22 RX ADMIN — PROMETHAZINE HYDROCHLORIDE 12.5 MG: 25 INJECTION, SOLUTION INTRAMUSCULAR; INTRAVENOUS at 21:25

## 2019-01-22 RX ADMIN — SODIUM CHLORIDE, POTASSIUM CHLORIDE, SODIUM LACTATE AND CALCIUM CHLORIDE 125 ML/HR: 600; 310; 30; 20 INJECTION, SOLUTION INTRAVENOUS at 18:26

## 2019-01-22 NOTE — NURSING NOTE
Call to APRN to inform of pts arrival to  with complaints of N/V/D.  Pt states she has not been able to keep anything down, and hasnt eaten since yesterday. Reactive Nst, discussed results of UA, and ctx pattern.   Orders received that pt may have zofran for nausea, and check cervix.

## 2019-01-22 NOTE — TELEPHONE ENCOUNTER
----- Message from Lexie Beck sent at 1/22/2019  3:24 PM EST -----  Contact: PT  PT IS 34W5D AND CALLED STATING SHE HAS HAD VOMITING AND DIARRHEA TODAY.  SHE ASKED IF WE CAN SEND IN RX TO [facility].  THANKS

## 2019-01-23 VITALS
OXYGEN SATURATION: 99 % | BODY MASS INDEX: 23.03 KG/M2 | SYSTOLIC BLOOD PRESSURE: 116 MMHG | TEMPERATURE: 98.3 F | RESPIRATION RATE: 16 BRPM | DIASTOLIC BLOOD PRESSURE: 83 MMHG | HEART RATE: 114 BPM | WEIGHT: 114 LBS

## 2019-01-23 LAB
ALBUMIN SERPL-MCNC: 3.4 G/DL (ref 3.5–5)
ALBUMIN/GLOB SERPL: 1.2 G/DL (ref 1–2)
ALP SERPL-CCNC: 131 U/L (ref 38–126)
ALT SERPL W P-5'-P-CCNC: 11 U/L (ref 13–69)
AMPHET+METHAMPHET UR QL: NEGATIVE
AMPHETAMINES UR QL: NEGATIVE
ANION GAP SERPL CALCULATED.3IONS-SCNC: 12.8 MMOL/L (ref 10–20)
AST SERPL-CCNC: 20 U/L (ref 15–46)
BARBITURATES UR QL SCN: NEGATIVE
BENZODIAZ UR QL SCN: NEGATIVE
BILIRUB SERPL-MCNC: 0.4 MG/DL (ref 0.2–1.3)
BILIRUB UR QL STRIP: NEGATIVE
BUN BLD-MCNC: 5 MG/DL (ref 7–20)
BUN/CREAT SERPL: 10 (ref 7.1–23.5)
BUPRENORPHINE SERPL-MCNC: NEGATIVE NG/ML
CALCIUM SPEC-SCNC: 8.1 MG/DL (ref 8.4–10.2)
CANNABINOIDS SERPL QL: NEGATIVE
CHLORIDE SERPL-SCNC: 105 MMOL/L (ref 98–107)
CLARITY UR: CLEAR
CO2 SERPL-SCNC: 21 MMOL/L (ref 26–30)
COCAINE UR QL: NEGATIVE
COLOR UR: YELLOW
CREAT BLD-MCNC: 0.5 MG/DL (ref 0.6–1.3)
CREAT UR-MCNC: 23.7 MG/DL
DEPRECATED RDW RBC AUTO: 41.4 FL (ref 37–54)
ERYTHROCYTE [DISTWIDTH] IN BLOOD BY AUTOMATED COUNT: 16.1 % (ref 11.5–14.5)
GFR SERPL CREATININE-BSD FRML MDRD: >150 ML/MIN/1.73
GLOBULIN UR ELPH-MCNC: 2.8 GM/DL
GLUCOSE BLD-MCNC: 79 MG/DL (ref 74–98)
GLUCOSE UR STRIP-MCNC: NEGATIVE MG/DL
HCT VFR BLD AUTO: 29.7 % (ref 37–47)
HGB BLD-MCNC: 8.9 G/DL (ref 12–16)
HGB UR QL STRIP.AUTO: NEGATIVE
KETONES UR QL STRIP: NEGATIVE
LEUKOCYTE ESTERASE UR QL STRIP.AUTO: NEGATIVE
MCH RBC QN AUTO: 21.8 PG (ref 27–31)
MCHC RBC AUTO-ENTMCNC: 30 G/DL (ref 30–37)
MCV RBC AUTO: 72.6 FL (ref 81–99)
METHADONE UR QL SCN: NEGATIVE
NITRITE UR QL STRIP: NEGATIVE
OPIATES UR QL: NEGATIVE
OXYCODONE UR QL SCN: NEGATIVE
PCP UR QL SCN: NEGATIVE
PH UR STRIP.AUTO: 6.5 [PH] (ref 5–8)
PLATELET # BLD AUTO: 160 10*3/MM3 (ref 130–400)
PMV BLD AUTO: 11.2 FL (ref 6–12)
POTASSIUM BLD-SCNC: 3.8 MMOL/L (ref 3.5–5.1)
PROPOXYPH UR QL: NEGATIVE
PROT SERPL-MCNC: 6.2 G/DL (ref 6.3–8.2)
PROT UR QL STRIP: NEGATIVE
PROT UR-MCNC: 11 MG/DL (ref 0–11.9)
PROT/CREAT UR: 464.1 MG/G CREA
RBC # BLD AUTO: 4.09 10*6/MM3 (ref 4.2–5.4)
SODIUM BLD-SCNC: 135 MMOL/L (ref 137–145)
SP GR UR STRIP: 1.01 (ref 1–1.03)
TRICYCLICS UR QL SCN: NEGATIVE
UROBILINOGEN UR QL STRIP: NORMAL
WBC NRBC COR # BLD: 17.1 10*3/MM3 (ref 4.8–10.8)

## 2019-01-23 PROCEDURE — 25010000002 ONDANSETRON PER 1 MG: Performed by: NURSE PRACTITIONER

## 2019-01-23 PROCEDURE — 96361 HYDRATE IV INFUSION ADD-ON: CPT

## 2019-01-23 PROCEDURE — 81003 URINALYSIS AUTO W/O SCOPE: CPT | Performed by: NURSE PRACTITIONER

## 2019-01-23 PROCEDURE — 96376 TX/PRO/DX INJ SAME DRUG ADON: CPT

## 2019-01-23 PROCEDURE — 59025 FETAL NON-STRESS TEST: CPT

## 2019-01-23 PROCEDURE — 25010000002 MORPHINE PER 10 MG: Performed by: NURSE PRACTITIONER

## 2019-01-23 PROCEDURE — 96375 TX/PRO/DX INJ NEW DRUG ADDON: CPT

## 2019-01-23 PROCEDURE — 25010000002 PROMETHAZINE PER 50 MG: Performed by: NURSE PRACTITIONER

## 2019-01-23 PROCEDURE — 80306 DRUG TEST PRSMV INSTRMNT: CPT | Performed by: NURSE PRACTITIONER

## 2019-01-23 PROCEDURE — 84156 ASSAY OF PROTEIN URINE: CPT | Performed by: NURSE PRACTITIONER

## 2019-01-23 PROCEDURE — 82570 ASSAY OF URINE CREATININE: CPT | Performed by: NURSE PRACTITIONER

## 2019-01-23 PROCEDURE — 99214 OFFICE O/P EST MOD 30 MIN: CPT | Performed by: NURSE PRACTITIONER

## 2019-01-23 RX ORDER — FAMOTIDINE 20 MG/1
20 TABLET, FILM COATED ORAL 2 TIMES DAILY
Status: DISCONTINUED | OUTPATIENT
Start: 2019-01-23 | End: 2019-01-23 | Stop reason: HOSPADM

## 2019-01-23 RX ORDER — ZOLPIDEM TARTRATE 5 MG/1
5 TABLET ORAL NIGHTLY PRN
Status: DISCONTINUED | OUTPATIENT
Start: 2019-01-23 | End: 2019-01-23 | Stop reason: HOSPADM

## 2019-01-23 RX ORDER — FAMOTIDINE 20 MG/1
20 TABLET, FILM COATED ORAL 2 TIMES DAILY
Status: DISCONTINUED | OUTPATIENT
Start: 2019-01-23 | End: 2019-01-23

## 2019-01-23 RX ORDER — MORPHINE SULFATE 2 MG/ML
2 INJECTION, SOLUTION INTRAMUSCULAR; INTRAVENOUS ONCE
Status: COMPLETED | OUTPATIENT
Start: 2019-01-23 | End: 2019-01-23

## 2019-01-23 RX ADMIN — SODIUM CHLORIDE, POTASSIUM CHLORIDE, SODIUM LACTATE AND CALCIUM CHLORIDE 125 ML/HR: 600; 310; 30; 20 INJECTION, SOLUTION INTRAVENOUS at 10:46

## 2019-01-23 RX ADMIN — ONDANSETRON 4 MG: 2 INJECTION INTRAMUSCULAR; INTRAVENOUS at 10:10

## 2019-01-23 RX ADMIN — ONDANSETRON 4 MG: 2 INJECTION INTRAMUSCULAR; INTRAVENOUS at 01:30

## 2019-01-23 RX ADMIN — FAMOTIDINE 20 MG: 20 TABLET, FILM COATED ORAL at 01:29

## 2019-01-23 RX ADMIN — PROMETHAZINE HYDROCHLORIDE 12.5 MG: 25 INJECTION, SOLUTION INTRAMUSCULAR; INTRAVENOUS at 03:30

## 2019-01-23 RX ADMIN — MORPHINE SULFATE 2 MG: 2 INJECTION, SOLUTION INTRAMUSCULAR; INTRAVENOUS at 06:01

## 2019-01-23 RX ADMIN — ZOLPIDEM TARTRATE 5 MG: 5 TABLET ORAL at 01:29

## 2019-01-23 RX ADMIN — FAMOTIDINE 20 MG: 20 TABLET, FILM COATED ORAL at 10:15

## 2019-01-23 NOTE — NURSING NOTE
Pt reporting that she has not slept all night because she is having restless legs. Pt having tons of erratic movements with legs up then down flipping and flopping in the bed will ask APRN if we may give her soething else to help her to rest

## 2019-01-23 NOTE — NON STRESS TEST
Triage Note - Nursing Documentation  Labor and Delivery Admission Log    Cortney Pastor  : 1995  MRN: [id number]  CSN: [id number]    Date in / Time in:  2019  Time in: 17:23    Date out / Time out:    Time out: 1610    Nurse: Lisa Clemente, RN    Patient Info: She is a 23 y.o. year old  at 34w6d with an ELYSSA of 2019, by Ultrasound who was seen on the [facility] [place].    Chief Complaint:   Chief Complaint   Patient presents with   • Vomiting     N/V/D since 1000 this am       Provider Instructions / Disposition: IVF's medication Ambien Phenergan Zofran Immodium  Monitoring VE unchanged    Patient Active Problem List   Diagnosis   • HPV in female   • Epigastric pain   • Left upper quadrant pain   • Nausea   • Heartburn   • Constipation   • Ovarian cyst   • Hx of  section   • Rubella non-immune status, antepartum   • Vulvovaginal candidiasis   • Gestational diabetes mellitus, class A2   • Previous  section   • Encounter for sterilization       NST Documentation (Only applicable > 32 weeks): Interpretation A  Nonstress Test Interpretation A: Reactive (19 1713 : Lisa Clemente, RN)

## 2019-01-23 NOTE — PROGRESS NOTES
BRYAN Dorado [place]  : 1995  MRN: [id number]  CSN: [id number]    Labor progress note    Subjective   She reports no vomiting or diarrhea. She still feels some contractions occasionally. Baby is active.     Objective   Min/max vitals past 24 hours:  Temp  Min: 97.9 °F (36.6 °C)  Max: 98.4 °F (36.9 °C)   BP  Min: 87/46  Max: 132/94   Pulse  Min: 97  Max: 122   Resp  Min: 16  Max: 16        FHT's: reassuring, reactive and category 1   Cervix: was checked (by me): outer os 1-2, inner os 1 cm / 0 % / Floating   Contractions: irregular - external monitors used      Assessment   1. IUP at 34w6d  2. Fetal status reassuring   3. N/V/D resolved  4.  contractions     Plan   1. Discharge home   2. Discussed S/S TREVOR  3. Advised to drink a lot of fluids. Zofran PRN  4. RTO tomorrow for BPP    Cindy Dorado, APRN  2019  4:01 PM

## 2019-01-23 NOTE — PROGRESS NOTES
BRYAN Dorado York  : 1995  MRN: [id number]  CSN: [id number]    Progress note    Subjective   She reports she is hungry this am. She is still feeling contractions. States she was taken off of her Glyburide. Got a little bit of sleep. Baby is active.     Objective   Min/max vitals past 24 hours:  Temp  Min: 97.9 °F (36.6 °C)  Max: 98.4 °F (36.9 °C)   BP  Min: 93/55  Max: 132/94   Pulse  Min: 101  Max: 122   Resp  Min: 16  Max: 16     Labs- UA negative, PC ratio 464, UDS neg            CBC- WBC 17.1, H/H 8.9/29.7, Plt 160            CMP- ALT AST normal        FHT's: reassuring, reactive and category 1   Cervix: was not checked.   Contractions: irregular - external monitors used           Assessment   1. IUP at 34w6d  2. Fetal status reassuring     Plan   1. Will give diet and see how she tolerates.    Cindy Dorado, APRN  2019  8:12 AM

## 2019-01-23 NOTE — H&P
BRYAN Willard  Cortney Pastor  : 1995  MRN: [id number]  CSN: [id number]    Chief Complain:  N/V/D    History and Physical    Cortney Pastor is a 23 y.o. year old  with an Estimated Date of Delivery: 19 currently at 34w6d presenting with N/V/D.  Pt had received bolus of IVF's & zofran very early after arrival with c/o n/v/d.  She desired to go home if we would call in zofran.  RN called stating pt with various complaints through evening.  She did have a spell of diarrhea & received imodium.  She c/o nausea again & received phenergan.  RN reports pt resting quietly - no contractions per monitor.  States pts father came out stating pt was hurting.   RN reports she did an exam - earlier cx was closed inner os and outer os 2 & thick -3.  She stated she feels there is a change to 1 cm inner OS /outer os 2 / still thick - no bloody show or fluid leaking.  Pts BS 89 earlier.     Arrived to see pt - she is resting quietly - she states she is hurting back, sides, abdomen, legs - everywhere.  Pain is sporadic - not specific.   She does have a hx of UTI's though states certain it is not UTI.  She also c/o heartburn.       [facility] has been [facility].   Prenatal course has been complicated by GDM and was started on glyburide - she states she does not take any longer. Frequent yeast infections.  Intermittent BH's contractions & n/v.         Obstetric History       T2      L2     SAB0   TAB0   Ectopic0   Molar0   Multiple0   Live Births2       # Outcome Date GA Lbr Flex/2nd Weight Sex Delivery Anes PTL Lv   3 Current            2 Term 2016 38w0d  3175 g (7 lb) F CS-LTranv   MUKESH      Complications: History of blood loss   1 Term 02/01/15   3175 g (7 lb) F Vag-Forceps EPI  MUKESH        Past Medical History:   Diagnosis Date   • Abnormal Pap smear of cervix 2015    ASCUS +HPV   • Anxiety and depression    • Body piercing     LOWER LIP, TOP OF LEFT EAR    • Constipation    • Depression    • GERD (gastroesophageal reflux disease)    • Hearing loss     REPORTS USES NO HEARING DEVICES   • History of blood transfusion 2016    REPORTS NO REACTION   • History of Papanicolaou smear of cervix 2016    NORMAL    • HPV in female    • Hypoglycemia    • Migraine    • Ovarian cyst 2017    RIGHT    • Rubella non-immune status, antepartum 2018   • Tattoos     1 ON LEFT WRIST, BILATERAL SHOULDER BLADES, LEFT LEG     Past Surgical History:   Procedure Laterality Date   •  SECTION  2016    DR MARC SALINAS   • EAR TUBES         Review of Systems        Pertinent items are noted in HPI, all other systems reviewed and negative  Allergies   Allergen Reactions   • Latex Hives     Social History    Tobacco Use      Smoking status: Never Smoker      Smokeless tobacco: Never Used      BP 93/55   Pulse 113   Temp 98.4 °F (36.9 °C)   Resp 16   Wt 51.7 kg (114 lb)   LMP 2018   SpO2 100%   BMI 23.03 kg/m²     Physical Exam       Psych: Altert and oriented to time, place and person  Mood and affect appropriate - tired   General: well developed; well nourished  No obvious distress at present   Head: normocephalic  Musculoskeletal: Normal gait  Full range of motion  Heart: regular rate and rhythm, no murmur  Lungs:  breathing is unlabored  clear to auscultation bilaterally  Back: Rt. CVAT  Abdomen: Gravid - soft and non-tender   Occasional spaced contractions  FHT's 150 + accels and variability   Lower Extremities:  Neg edema, DTR's 2+ ,  Neg. Clonus and no calf tenderness  V/E: 1-2  / thick /soft /high - membranes intact                     Prenatal Labs  Lab Results   Component Value Date    HGB 8.9 (L) 2019    HEPBSAG Negative 2018    ABORH B Rh Positive 2014    ABSCRN Negative 2018    [id number] Non Reactive 2018    HEPCVIRUSABY 0.1 2018    URINECX No growth 2019       Current Labs Reviewed   CBC, CMP and  UA    Assessment:    1. IUP at 34w6d  2. Previous C/S - term   3.  contractions /cervical change  4. FHT's reassuring  5. Anemia   6. Leukocytosis   7. GDM     Plan:  IVF's - zofran - labs   Consult with Dr. Martel pt status as written/ labs    Ambien   Spoke with pt & FOB with plan / encouraged questions and answered             Kathrin Lopez CNM  2019  12:27 AM

## 2019-01-23 NOTE — NURSING NOTE
EMILY MILLAN at bedside VE done no change will send home and pt to make appt when she can come back   15:45 DC teaching done pt VU

## 2019-01-23 NOTE — NURSING NOTE
"Discussed POC with p.t pt seamed pleased that APRN is calling in a refill for Zofran. Pt is comfortable going home knowing she is not having cervical change. Pt stated the only reason she came in was that she needed a refill on her zofran and she had called the office and no one got back with her and that she was on the \"shitter\" all day and couldn't take care of her other 2 children if she was on the \"shitter all day\", Pt wants to know why she is has N&V. Pt is afebrile. Pt said she wants to go home because she has an appointment tomorrow and will discuss the n&v with provider>  "

## 2019-01-24 ENCOUNTER — ROUTINE PRENATAL (OUTPATIENT)
Dept: OBSTETRICS AND GYNECOLOGY | Facility: CLINIC | Age: 24
End: 2019-01-24

## 2019-01-24 VITALS — DIASTOLIC BLOOD PRESSURE: 70 MMHG | WEIGHT: 116 LBS | BODY MASS INDEX: 23.43 KG/M2 | SYSTOLIC BLOOD PRESSURE: 122 MMHG

## 2019-01-24 DIAGNOSIS — Z36.85 ANTENATAL SCREENING FOR STREPTOCOCCUS B: ICD-10-CM

## 2019-01-24 DIAGNOSIS — O24.419 GESTATIONAL DIABETES MELLITUS, CLASS A2: Primary | ICD-10-CM

## 2019-01-24 LAB — BACTERIA SPEC AEROBE CULT: NORMAL

## 2019-01-24 PROCEDURE — 99213 OFFICE O/P EST LOW 20 MIN: CPT | Performed by: MIDWIFE

## 2019-01-24 NOTE — PROGRESS NOTES
Chief Complaint   Patient presents with   • Routine Prenatal Visit     BPP today and GBS done, c/o contractions        HPI: Cortney is a  currently at 35w0d who today reports the following:  Contractions - YES - has had these for several weeks; Leaking - No; Vaginal bleeding -  No; Swelling of extremities - No; Baby is active - YES She was hospitalized on  until  with no cervical change. She missed her BPP yesterday and came for it today. She states her sugars have been good and she was taken off of her meds.    ROS:   GI:   Nausea - No; Constipation - No   Neuro:  Headache - No; Visual disturbances - No.    Social History    Tobacco Use      Smoking status: Never Smoker      Smokeless tobacco: Never Used      EXAM:   Vitals:  See prenatal flowsheet, /70, Wt +2#   Abdomen:   See prenatal flowsheet as noted and reviewed, soft, nontender   Pelvic:  See prenatal flowsheet, cervix soft and posterior   Urine:  See prenatal flowsheet as noted and reviewed    MDM:  Impression: Supervision of low risk pregnancy  DM - GDMA2  Previous C/S with planned repeat C/S  Breech presentation   contractions   Tests done today: BPP - 8 / 8   Topics discussed: kick counts and fetal movement  labor signs and symptoms   Tests next visit: none   Next visit: 1 week. Will cancel NST for tomorrow     This note was electronically signed.  Cindy Dorado, YANA  2019

## 2019-01-25 ENCOUNTER — HOSPITAL ENCOUNTER (OUTPATIENT)
Facility: HOSPITAL | Age: 24
Discharge: HOME | End: 2019-01-25
Attending: NURSE PRACTITIONER | Admitting: NURSE PRACTITIONER

## 2019-01-25 VITALS
DIASTOLIC BLOOD PRESSURE: 84 MMHG | HEART RATE: 109 BPM | RESPIRATION RATE: 16 BRPM | OXYGEN SATURATION: 100 % | SYSTOLIC BLOOD PRESSURE: 116 MMHG | TEMPERATURE: 98.2 F

## 2019-01-25 LAB
BILIRUB BLD-MCNC: NEGATIVE MG/DL
CLARITY, POC: CLEAR
COLOR UR: ABNORMAL
GLUCOSE BLDC GLUCOMTR-MCNC: 96 MG/DL (ref 70–130)
GLUCOSE UR STRIP-MCNC: NEGATIVE MG/DL
KETONES UR QL: ABNORMAL
LEUKOCYTE EST, POC: NEGATIVE
NITRITE UR-MCNC: NEGATIVE MG/ML
PH UR: 6 [PH] (ref 5–8)
PROT UR STRIP-MCNC: NEGATIVE MG/DL
RBC # UR STRIP: NEGATIVE /UL
SP GR UR: 1.01 (ref 1–1.03)
UROBILINOGEN UR QL: NORMAL

## 2019-01-25 PROCEDURE — 59025 FETAL NON-STRESS TEST: CPT | Performed by: NURSE PRACTITIONER

## 2019-01-25 PROCEDURE — 82962 GLUCOSE BLOOD TEST: CPT

## 2019-01-25 PROCEDURE — G0463 HOSPITAL OUTPT CLINIC VISIT: HCPCS

## 2019-01-25 PROCEDURE — 59025 FETAL NON-STRESS TEST: CPT

## 2019-01-25 PROCEDURE — 81002 URINALYSIS NONAUTO W/O SCOPE: CPT | Performed by: NURSE PRACTITIONER

## 2019-01-25 NOTE — NON STRESS TEST
"Triage Note - Nursing Documentation  Labor and Delivery Admission Log    Cortney Boucher  : 1995  MRN: [id number]  CSN: [id number]    Date in / Time in:  2019  Time in: 320  Date out / Time out:    Time out: 405  Nurse: Carissa Larsen, RN    Patient Info: She is a 23 y.o. year old  at 35w1d with an ELYSSA of 2019, by Ultrasound who was seen on the [facility].    Chief Complaint:   Chief Complaint   Patient presents with   • Abdominal Cramping     \" I'm having upper abd. pain and when I was at [facility] I got dizzy and my eyesight got blurry.\"       Provider Instructions / Disposition: Pt 24 yo  come in at 0320 with complaints of upper abdominal pain. Pt reports being seen in the office today around 4 pm and then went to [facility]. Pt. Reports while there she started having chest pain, dizziness, vision changes and ctx. EMS was called and she was assessed and released from their care per pt. Vitals reported, UA reported, Reactive NST ctx X 3 in 30 min mild, abd soft and non tender, Finger stick 96. VE no change from office today 1-2 thick and high. Positive fetal movement no blood or leaking of fluid noticed. Per Crow MILLAN pt may be discharged home and is to keep up coming office appt.   Patient Active Problem List   Diagnosis   • HPV in female   • Epigastric pain   • Left upper quadrant pain   • Nausea   • Heartburn   • Constipation   • Ovarian cyst   • Hx of  section   • Rubella non-immune status, antepartum   • Vulvovaginal candidiasis   • Gestational diabetes mellitus, class A2   • Previous  section   • Encounter for sterilization       NST Documentation (Only applicable > 32 weeks): Interpretation A  Nonstress Test Interpretation A: Reactive (19 0404 : Carissa Lasren, RN)    "

## 2019-01-26 LAB — GP B STREP DNA SPEC QL NAA+PROBE: NEGATIVE

## 2019-01-29 ENCOUNTER — ROUTINE PRENATAL (OUTPATIENT)
Dept: OBSTETRICS AND GYNECOLOGY | Facility: CLINIC | Age: 24
End: 2019-01-29

## 2019-01-29 VITALS — DIASTOLIC BLOOD PRESSURE: 68 MMHG | SYSTOLIC BLOOD PRESSURE: 118 MMHG | BODY MASS INDEX: 23.83 KG/M2 | WEIGHT: 118 LBS

## 2019-01-29 DIAGNOSIS — Z98.891 PREVIOUS CESAREAN SECTION: ICD-10-CM

## 2019-01-29 DIAGNOSIS — Z30.2 ENCOUNTER FOR STERILIZATION: ICD-10-CM

## 2019-01-29 DIAGNOSIS — B37.31 VULVOVAGINAL CANDIDIASIS: ICD-10-CM

## 2019-01-29 DIAGNOSIS — Z34.93 PRENATAL CARE IN THIRD TRIMESTER: Primary | ICD-10-CM

## 2019-01-29 DIAGNOSIS — R05.3 PERSISTENT COUGH: ICD-10-CM

## 2019-01-29 PROCEDURE — 99213 OFFICE O/P EST LOW 20 MIN: CPT | Performed by: OBSTETRICS & GYNECOLOGY

## 2019-01-29 RX ORDER — BENZONATATE 100 MG/1
100 CAPSULE ORAL 3 TIMES DAILY PRN
Qty: 60 CAPSULE | Refills: 0 | Status: SHIPPED | OUTPATIENT
Start: 2019-01-29 | End: 2021-04-13

## 2019-02-01 ENCOUNTER — HOSPITAL ENCOUNTER (OUTPATIENT)
Dept: LABOR AND DELIVERY | Facility: HOSPITAL | Age: 24
Discharge: HOME | End: 2019-02-01
Admitting: OBSTETRICS & GYNECOLOGY

## 2019-02-01 ENCOUNTER — HOSPITAL ENCOUNTER (OUTPATIENT)
Facility: HOSPITAL | Age: 24
Discharge: HOME | End: 2019-02-01
Attending: OBSTETRICS & GYNECOLOGY | Admitting: OBSTETRICS & GYNECOLOGY

## 2019-02-01 VITALS
HEART RATE: 116 BPM | BODY MASS INDEX: 23.79 KG/M2 | DIASTOLIC BLOOD PRESSURE: 66 MMHG | OXYGEN SATURATION: 96 % | SYSTOLIC BLOOD PRESSURE: 109 MMHG | HEIGHT: 59 IN | WEIGHT: 118 LBS

## 2019-02-01 LAB
BILIRUB BLD-MCNC: NEGATIVE MG/DL
CLARITY, POC: CLEAR
COLOR UR: YELLOW
GLUCOSE BLDC GLUCOMTR-MCNC: 86 MG/DL (ref 70–130)
GLUCOSE UR STRIP-MCNC: NEGATIVE MG/DL
KETONES UR QL: NEGATIVE
LEUKOCYTE EST, POC: NEGATIVE
NITRITE UR-MCNC: NEGATIVE MG/ML
PH UR: 7.5 [PH] (ref 5–8)
PROT UR STRIP-MCNC: ABNORMAL MG/DL
RBC # UR STRIP: NEGATIVE /UL
SP GR UR: 1.01 (ref 1–1.03)
UROBILINOGEN UR QL: NORMAL

## 2019-02-01 PROCEDURE — 59025 FETAL NON-STRESS TEST: CPT

## 2019-02-01 PROCEDURE — 82962 GLUCOSE BLOOD TEST: CPT

## 2019-02-01 PROCEDURE — 81002 URINALYSIS NONAUTO W/O SCOPE: CPT | Performed by: OBSTETRICS & GYNECOLOGY

## 2019-02-01 PROCEDURE — G0463 HOSPITAL OUTPT CLINIC VISIT: HCPCS

## 2019-02-01 NOTE — NON STRESS TEST
Triage Note - Nursing Documentation  Labor and Delivery Admission Log    Cortney Boucher  : 1995  MRN: [id number]  CSN: [id number]    Date in / Time in:  2019  Time in: 1200    Date out / Time out:    Time out: 1500    Nurse: Lisa Clemente RN    Patient Info: She is a 23 y.o. year old  at 36w1d with an ELYSSA of 2019, by Ultrasound who was seen on the [facility].    Chief Complaint: No chief complaint on file.      Provider Instructions / Disposition: monitor, po hydration VE no change from last admission    Patient Active Problem List   Diagnosis   • HPV in female   • Epigastric pain   • Left upper quadrant pain   • Nausea   • Heartburn   • Constipation   • Ovarian cyst   • Hx of  section   • Rubella non-immune status, antepartum   • Vulvovaginal candidiasis   • Gestational diabetes mellitus, class A2   • Previous  section   • Encounter for sterilization       NST Documentation (Only applicable > 32 weeks):

## 2019-02-05 ENCOUNTER — ROUTINE PRENATAL (OUTPATIENT)
Dept: OBSTETRICS AND GYNECOLOGY | Facility: CLINIC | Age: 24
End: 2019-02-05

## 2019-02-05 ENCOUNTER — HOSPITAL ENCOUNTER (OUTPATIENT)
Facility: HOSPITAL | Age: 24
Discharge: HOME | End: 2019-02-06
Attending: NURSE PRACTITIONER | Admitting: OBSTETRICS & GYNECOLOGY

## 2019-02-05 VITALS — SYSTOLIC BLOOD PRESSURE: 118 MMHG | DIASTOLIC BLOOD PRESSURE: 72 MMHG | BODY MASS INDEX: 23.83 KG/M2 | WEIGHT: 118 LBS

## 2019-02-05 DIAGNOSIS — O24.419 GESTATIONAL DIABETES MELLITUS (GDM), ANTEPARTUM, GESTATIONAL DIABETES METHOD OF CONTROL UNSPECIFIED: ICD-10-CM

## 2019-02-05 DIAGNOSIS — Z98.891 PREVIOUS CESAREAN SECTION: ICD-10-CM

## 2019-02-05 DIAGNOSIS — Z34.93 PRENATAL CARE IN THIRD TRIMESTER: Primary | ICD-10-CM

## 2019-02-05 LAB — GLUCOSE BLDC GLUCOMTR-MCNC: 96 MG/DL (ref 70–130)

## 2019-02-05 PROCEDURE — 82962 GLUCOSE BLOOD TEST: CPT

## 2019-02-05 PROCEDURE — G0463 HOSPITAL OUTPT CLINIC VISIT: HCPCS

## 2019-02-05 PROCEDURE — 25010000002 BETAMETHASONE ACET & SOD PHOS PER 4 MG: Performed by: OBSTETRICS & GYNECOLOGY

## 2019-02-05 PROCEDURE — 59025 FETAL NON-STRESS TEST: CPT | Performed by: NURSE PRACTITIONER

## 2019-02-05 PROCEDURE — 99213 OFFICE O/P EST LOW 20 MIN: CPT | Performed by: OBSTETRICS & GYNECOLOGY

## 2019-02-05 PROCEDURE — 96361 HYDRATE IV INFUSION ADD-ON: CPT

## 2019-02-05 PROCEDURE — 59025 FETAL NON-STRESS TEST: CPT

## 2019-02-05 PROCEDURE — 96360 HYDRATION IV INFUSION INIT: CPT

## 2019-02-05 PROCEDURE — 96372 THER/PROPH/DIAG INJ SC/IM: CPT

## 2019-02-05 RX ORDER — SODIUM CHLORIDE 9 MG/ML
75 INJECTION, SOLUTION INTRAVENOUS CONTINUOUS
Status: DISCONTINUED | OUTPATIENT
Start: 2019-02-05 | End: 2019-02-06 | Stop reason: HOSPADM

## 2019-02-05 RX ORDER — BETAMETHASONE SODIUM PHOSPHATE AND BETAMETHASONE ACETATE 3; 3 MG/ML; MG/ML
12 INJECTION, SUSPENSION INTRA-ARTICULAR; INTRALESIONAL; INTRAMUSCULAR; SOFT TISSUE EVERY 24 HOURS
Status: COMPLETED | OUTPATIENT
Start: 2019-02-05 | End: 2019-02-06

## 2019-02-05 RX ORDER — ZOLPIDEM TARTRATE 5 MG/1
5 TABLET ORAL ONCE
Status: COMPLETED | OUTPATIENT
Start: 2019-02-05 | End: 2019-02-05

## 2019-02-05 RX ORDER — FAMOTIDINE 20 MG/1
20 TABLET, FILM COATED ORAL 2 TIMES DAILY
Status: DISCONTINUED | OUTPATIENT
Start: 2019-02-05 | End: 2019-02-06 | Stop reason: HOSPADM

## 2019-02-05 RX ADMIN — SODIUM CHLORIDE 75 ML/HR: 9 INJECTION, SOLUTION INTRAVENOUS at 18:07

## 2019-02-05 RX ADMIN — FAMOTIDINE 20 MG: 20 TABLET, FILM COATED ORAL at 20:26

## 2019-02-05 RX ADMIN — ZOLPIDEM TARTRATE 5 MG: 5 TABLET ORAL at 23:00

## 2019-02-05 RX ADMIN — BETAMETHASONE SODIUM PHOSPHATE AND BETAMETHASONE ACETATE 12 MG: 3; 3 INJECTION, SUSPENSION INTRA-ARTICULAR; INTRALESIONAL; INTRAMUSCULAR at 18:13

## 2019-02-06 VITALS
BODY MASS INDEX: 23.79 KG/M2 | SYSTOLIC BLOOD PRESSURE: 122 MMHG | HEIGHT: 59 IN | HEART RATE: 117 BPM | DIASTOLIC BLOOD PRESSURE: 73 MMHG | RESPIRATION RATE: 16 BRPM | TEMPERATURE: 98.4 F | OXYGEN SATURATION: 98 % | WEIGHT: 118 LBS

## 2019-02-06 LAB
GLUCOSE BLDC GLUCOMTR-MCNC: 103 MG/DL (ref 70–130)
GLUCOSE BLDC GLUCOMTR-MCNC: 147 MG/DL (ref 70–130)
GLUCOSE BLDC GLUCOMTR-MCNC: 83 MG/DL (ref 70–130)

## 2019-02-06 PROCEDURE — 96361 HYDRATE IV INFUSION ADD-ON: CPT

## 2019-02-06 PROCEDURE — 82962 GLUCOSE BLOOD TEST: CPT

## 2019-02-06 PROCEDURE — 96372 THER/PROPH/DIAG INJ SC/IM: CPT

## 2019-02-06 PROCEDURE — 99213 OFFICE O/P EST LOW 20 MIN: CPT | Performed by: MIDWIFE

## 2019-02-06 PROCEDURE — 25010000002 BETAMETHASONE ACET & SOD PHOS PER 4 MG: Performed by: OBSTETRICS & GYNECOLOGY

## 2019-02-06 RX ADMIN — SODIUM CHLORIDE 75 ML/HR: 9 INJECTION, SOLUTION INTRAVENOUS at 06:49

## 2019-02-06 RX ADMIN — BETAMETHASONE SODIUM PHOSPHATE AND BETAMETHASONE ACETATE 30 MG: 3; 3 INJECTION, SUSPENSION INTRA-ARTICULAR; INTRALESIONAL; INTRAMUSCULAR at 12:32

## 2019-02-06 RX ADMIN — FAMOTIDINE 20 MG: 20 TABLET, FILM COATED ORAL at 11:23

## 2019-02-06 NOTE — PROGRESS NOTES
Chief Complaint   Patient presents with   • Pregnancy Ultrasound     BPP and Growth scan done today. C/O headaches, nausea, discharge and contractions        HPI:   Cortney is a  currently at 36w5d who today reports the following:  Contractions - No; Leaking - No; Vaginal bleeding -  No; Swelling of extremities - No. Good fetal movement - YES.    ROS:  GI: Nausea - YES; Constipation - No; Diarrhea - No. RUQ pain - No    Neuro: Headache - YES; Visual disturbances - No.    Pertinent items are noted in HPI, all other systems reviewed and negative    Review of History:  The following portions of the patient's history were reviewed and updated as appropriate:problem list, current medications, allergies, past family history, past medical history, past social history and past surgical history.    Current Facility-Administered Medications on File Prior to Visit   Medication Dose Route Frequency Provider Last Rate Last Dose   • betamethasone acetate-betamethasone sodium phosphate (CELESTONE SOLUSPAN) injection 12 mg  12 mg Intramuscular Q24H Juan Meza MD   12 mg at 19 1813   • famotidine (PEPCID) tablet 20 mg  20 mg Oral BID Kathrin oLpez CNM   20 mg at 19   • sodium chloride 0.9 % infusion  75 mL/hr Intravenous Continuous Juan Meza MD 75 mL/hr at 19 75 mL/hr at 19 180     Current Outpatient Medications on File Prior to Visit   Medication Sig Dispense Refill   • benzonatate (TESSALON PERLES) 100 MG capsule Take 1 capsule by mouth 3 (Three) Times a Day As Needed for Cough. 60 capsule 0   • Blood Glucose Monitoring Suppl (ONE TOUCH ULTRA 2) w/Device kit USE TO CHECK BLOOD GLUCOSE ONCE DAILY OR AS DIRECTED  0   • docusate sodium (COLACE) 100 MG capsule Take 1 capsule by mouth 2 (Two) Times a Day. 60 capsule 3   • glucose blood (IGLUCOSE TEST STRIPS) test strip Use as instructed 100 each 12   • glyBURIDE (DIABETA) 2.5 MG tablet Take 1 tablet by mouth Every  Night. 30 tablet 5   • Lancets (FREESTYLE) lancets Use as directed by your physician 100 each 12   • ondansetron (ZOFRAN) 4 MG tablet Take 1 tablet by mouth Every 6 (Six) Hours As Needed for Nausea or Vomiting. 20 tablet 3   • Prenatal Vit-Fe Fumarate-FA (PRENATAL VITAMINS) 28-0.8 MG tablet Take 1 tablet by mouth Daily. 90 tablet 3   • doxylamine (UNISOM) 25 MG tablet Take 1 tablet by mouth Every Night. 30 tablet 5   • ferrous sulfate 325 (65 FE) MG tablet Take 1 tablet by mouth 2 (Two) Times a Day Before Meals. 60 tablet 6   • psyllium (METAMUCIL SMOOTH TEXTURE) 28 % packet Take 1 packet by mouth Daily. 30 each 11   • vitamin B-6 (PYRIDOXINE) 25 MG tablet Take 1 tablet by mouth every night at bedtime. 30 tablet 5   • [DISCONTINUED] hydrOXYzine (ATARAX) 25 MG tablet Take 1 tablet by mouth At Night As Needed for Itching. 20 tablet 0       EXAM:  /72   Wt 53.5 kg (118 lb)   LMP 05/28/2018   BMI 23.83 kg/m²     Gen: NAD, conversant  Pulm: No use of accessory muscles, normal respirations  Abdomen: Gravid, nontender, size = dates, + fetal cardiac activity  Ext: no edema, no rashes, WWP  Gait: normal for pregnancy  Psych: Mood, insight, judgement intact  SVE: not performed    Lab Results   Component Value Date    ABORH B Rh Positive 11/21/2014    ABO B 07/24/2018    RH Positive 07/24/2018    ABSCRN Negative 07/24/2018       Social History    Tobacco Use      Smoking status: Never Smoker      Smokeless tobacco: Never Used      I have reviewed the prenatal labs and previous ultrasounds today.    MDM:  Diagnosis: Supervision of high risk pregnancy  DM - GDMA1  Previous C/S with planned repeat C/S   Desires tubal sterilization  Recurrent vulvovaginal candidiasis  Breech presentation  Persistent cough   Tests/Orders/Rx today: Orders Placed This Encounter   Procedures   • US Fetal Biophysical Profile;Without Non-Stress Testing     Order Specific Question:   Reason for Exam:     Answer:   GDM     IUP at 36+5 weeks.  Limited anatomy was reviewed today and is normal in appearance. EFW 3264g(77%) AC 96%. Cephalic presentation.  BPP . Amniotic fluid and fetal heart rate are normal.    Meds Ordered: none   Topics discussed: kick counts and fetal movement  PIH precautions   labor signs and symptoms   Tubal papers signed previously  A2DM - Continue twice weekly ANT  rLTCS + BTL at 39 weeks   Tests next visit: BPP  NST   Next visit: 1 week(s)     Juan Meza MD  Obstetrics and Gynecology  [facility]

## 2019-02-06 NOTE — NON STRESS TEST
Cortney Catia Boucher, a  at 36w5d with an ELYSSA of 2019, by Ultrasound, was seen at [facility] for a nonstress test.    Chief Complaint   Patient presents with   • Contractions     Sent from office to rule out contractions.       Patient Active Problem List   Diagnosis   • HPV in female   • Epigastric pain   • Left upper quadrant pain   • Nausea   • Heartburn   • Constipation   • Ovarian cyst   • Hx of  section   • Rubella non-immune status, antepartum   • Vulvovaginal candidiasis   • Gestational diabetes mellitus, class A2   • Previous  section   • Encounter for sterilization       Start Time:   Stop Time:     Interpretation A  Nonstress Test Interpretation A: Reactive (19 : Val Peng RN)

## 2019-02-06 NOTE — NURSING NOTE
"After steroids pt wanting to be checked \"just in case she has made changed\"  EMILY MILLAN here VE done no change. Discharge teaching to pt pt VU.  "

## 2019-02-06 NOTE — H&P
BRYAN Willard  Cortney Boucher  : 1995  MRN: [id number]  CSN: [id number]    History and Physical    Subjective   Cortney Boucher is a 23 y.o. year old  with an Estimated Date of Delivery: 19 currently at 36w6d sent from the office yesterday afternoon to monitor for contractions.   She was checked in the office and had cervical change to 3-4 cm but cervix was still thick. Baby has been active. She has had irregular contractions throughout the night. She received Celestone 12mg last pm @ 1830.    She has been recently examined.  At the time of her last cervical exam she was 3-4 cm dilated     Prenatal care has been with [facility].  It has been complicated by  labor and gestational diabetes.    History  Obstetric History       T2      L2     SAB0   TAB0   Ectopic0   Molar0   Multiple0   Live Births2       # Outcome Date GA Lbr Flex/2nd Weight Sex Delivery Anes PTL Lv   3 Current            2 Term 2016 38w0d  3175 g (7 lb) F CS-LTranv   MUKESH      Complications: History of blood loss   1 Term 02/01/15   3175 g (7 lb) F Vag-Forceps EPI  MUKESH        Past Medical History:   Diagnosis Date   • Abnormal Pap smear of cervix 2015    ASCUS +HPV   • Anxiety and depression    • Body piercing     LOWER LIP, TOP OF LEFT EAR   • Constipation    • Depression    • GERD (gastroesophageal reflux disease)    • Hearing loss     REPORTS USES NO HEARING DEVICES   • History of blood transfusion 2016    REPORTS NO REACTION   • History of Papanicolaou smear of cervix 2016    NORMAL    • HPV in female    • Hypoglycemia    • Migraine    • Ovarian cyst 2017    RIGHT    • Rubella non-immune status, antepartum 2018   • Tattoos     1 ON LEFT WRIST, BILATERAL SHOULDER BLADES, LEFT LEG     No current facility-administered medications on file prior to encounter.      Current Outpatient Medications on File Prior to Encounter   Medication Sig Dispense Refill   •  Blood Glucose Monitoring Suppl (ONE TOUCH ULTRA 2) w/Device kit USE TO CHECK BLOOD GLUCOSE ONCE DAILY OR AS DIRECTED  0   • glucose blood (IGLUCOSE TEST STRIPS) test strip Use as instructed 100 each 12   • glyBURIDE (DIABETA) 2.5 MG tablet Take 1 tablet by mouth Every Night. 30 tablet 5   • Lancets (FREESTYLE) lancets Use as directed by your physician 100 each 12   • ondansetron (ZOFRAN) 4 MG tablet Take 1 tablet by mouth Every 6 (Six) Hours As Needed for Nausea or Vomiting. 20 tablet 3   • Prenatal Vit-Fe Fumarate-FA (PRENATAL VITAMINS) 28-0.8 MG tablet Take 1 tablet by mouth Daily. 90 tablet 3   • benzonatate (TESSALON PERLES) 100 MG capsule Take 1 capsule by mouth 3 (Three) Times a Day As Needed for Cough. 60 capsule 0   • docusate sodium (COLACE) 100 MG capsule Take 1 capsule by mouth 2 (Two) Times a Day. 60 capsule 3   • doxylamine (UNISOM) 25 MG tablet Take 1 tablet by mouth Every Night. 30 tablet 5   • ferrous sulfate 325 (65 FE) MG tablet Take 1 tablet by mouth 2 (Two) Times a Day Before Meals. 60 tablet 6   • psyllium (METAMUCIL SMOOTH TEXTURE) 28 % packet Take 1 packet by mouth Daily. 30 each 11   • vitamin B-6 (PYRIDOXINE) 25 MG tablet Take 1 tablet by mouth every night at bedtime. 30 tablet 5     Allergies   Allergen Reactions   • Latex Hives     Past Surgical History:   Procedure Laterality Date   •  SECTION  2016    DR MARC SALINAS   • EAR TUBES       Family History   Problem Relation Age of Onset   • Cancer Other    • Diabetes Other    • Hypertension Other    • Hyperlipidemia Other    • Liver disease Other         aunt has liver disease from hep c   • Mental illness Other    • Thyroid disease Other    • Ulcers Mother      Social History     Socioeconomic History   • Marital status:      Spouse name: Not on file   • Number of children: Not on file   • Years of education: Not on file   • Highest education level: Not on file   Tobacco Use   • Smoking status: Never Smoker   • Smokeless  tobacco: Never Used   Substance and Sexual Activity   • Alcohol use: No   • Drug use: No   • Sexual activity: Yes     Partners: Male     Review of Systems  Pertinent items are noted in HPI, all other systems reviewed and negative     Objective  Vitals:    02/06/19 0000 02/06/19 0400 02/06/19 0600 02/06/19 0811   BP: 96/59 108/71 116/63 118/74   Pulse: 85 89 107 105   Resp:       Temp:       TempSrc:       SpO2: 98%      Weight:       Height:         Physical Exam:  General Appearance: alert, appears stated age and cooperative  Lungs: clear to auscultation, respirations regular, respirations even and respirations unlabored  Heart: regular rhythm and normal rate, normal S1, S2 and no murmur, gallop, or rubs  Abdomen: uterus gravid and nontender  Extremities: moves extremities well, no edema, no cyanosis and no redness    FHT's:  reassuring, reactive and category 1  Cervix:  was checked (by Dr Meza this am per patient): 3-4 cm / 20 % / -2  Presentation:  breech  Contractions:  irregular - external monitors used    Prenatal Labs  Lab Results   Component Value Date    HGB 8.9 (L) 01/22/2019    HEPBSAG Negative 07/24/2018    ABORH B Rh Positive 11/21/2014    ABSCRN Negative 07/24/2018    [id number] Non Reactive 07/24/2018    HEPCVIRUSABY 0.1 07/24/2018    STREPGPB Negative 01/24/2019    URINECX Mixed Aletha Isolated 01/22/2019       Current Labs Reviewed               I reviewed the patient's new clinical results.                  Lab Results (last 24 hours)     Procedure Component Value Units Date/Time    POC Glucose Once [[id number]]  (Normal) Collected:  02/06/19 0644    Specimen:  Blood Updated:  02/06/19 0650     Glucose 83 mg/dL      Comment: Serial Number: [id number]:  [id number]       POC Glucose Once [[id number]]  (Normal) Collected:  02/06/19 0219    Specimen:  Blood Updated:  02/06/19 0225     Glucose 103 mg/dL      Comment: Serial Number: [id number]:  [id number]       POC Glucose Once [[id number]]   (Normal) Collected:  02/05/19 2217    Specimen:  Blood Updated:  02/05/19 2225     Glucose 96 mg/dL      Comment: Serial Number: [id number]:  [id number]                Assessment   1. IUP at 36w6d  2. False labor  3. Reactive NST     Plan   1. Will give second Celestone @ 1230 today  2. Keep scheduled follow-up as previously scheduled.  3. Return if > 4 contractions/hour or any frequency associated with a change in vaginal discharge, leaking or bleeding.  4. Discharge home after that if stable    Cindy Dorado CNM  2/6/2019  8:42 AM

## 2019-02-06 NOTE — PROGRESS NOTES
S: Wants VE prior to discharge. States she continues to have back discomfort.   O: VSS FHT reactive, VE: 3/thick/high, soft posterior  A:  contractions, Previous CSection  P: She received second dose of Celestone. Discharge home. RTO tomorrow. Discussed s/s of TREVOR

## 2019-02-07 ENCOUNTER — ROUTINE PRENATAL (OUTPATIENT)
Dept: OBSTETRICS AND GYNECOLOGY | Facility: CLINIC | Age: 24
End: 2019-02-07

## 2019-02-07 VITALS — SYSTOLIC BLOOD PRESSURE: 118 MMHG | DIASTOLIC BLOOD PRESSURE: 70 MMHG | BODY MASS INDEX: 24.04 KG/M2 | WEIGHT: 119 LBS

## 2019-02-07 DIAGNOSIS — O24.419 GESTATIONAL DIABETES MELLITUS, CLASS A2: Primary | ICD-10-CM

## 2019-02-07 DIAGNOSIS — Z98.891 PREVIOUS CESAREAN SECTION: ICD-10-CM

## 2019-02-07 PROCEDURE — 99213 OFFICE O/P EST LOW 20 MIN: CPT | Performed by: OBSTETRICS & GYNECOLOGY

## 2019-02-07 NOTE — PROGRESS NOTES
Chief Complaint   Patient presents with   • Routine Prenatal Visit     C section scheduled , Wants to discuss moving c Section up        HPI:   , 37w0d gestation reports still persistent u.c.'s    ROS:  See Prenatal Episode/Flowsheet  /70   Wt 54 kg (119 lb)   LMP 2018   BMI 24.04 kg/m²      EXAM:  EXTREMITIES:  No swelling-See Prenatal Episode/Flowsheet    ABDOMEN:  FHTs/Movement noted-See Prenatal Episode/Flowsheet    URINE GLUCOSE/PROTEIN:  See Prenatal Episode/Flowsheet    PELVIC EXAM:  See Prenatal Episode/Flowsheet  CV:  Lungs:    MDM:    Lab Results   Component Value Date    HGB 8.9 (L) 2019    RUBELLAABIGG <0.90 (L) 2018    HEPBSAG Negative 2018    ABORH B Rh Positive 2014    ABO B 2018    RH Positive 2018    ABSCRN Negative 2018    [id number] Non Reactive 2018    HEPCVIRUSABY 0.1 2018    STREPGPB Negative 2019    URINECX Mixed Aletha Isolated 2019       U/S:    1. IUP 37w0d  2. Routine care   3. Move C/S to early next week given a2GDM, advainced dilation

## 2019-02-09 ENCOUNTER — HOSPITAL ENCOUNTER (OUTPATIENT)
Facility: HOSPITAL | Age: 24
Setting detail: SURGERY ADMIT
Discharge: HOME | End: 2019-02-09
Attending: NURSE PRACTITIONER | Admitting: OBSTETRICS & GYNECOLOGY

## 2019-02-09 ENCOUNTER — LAB (OUTPATIENT)
Dept: LAB | Facility: HOSPITAL | Age: 24
End: 2019-02-09

## 2019-02-09 VITALS
RESPIRATION RATE: 18 BRPM | HEIGHT: 59 IN | HEART RATE: 68 BPM | TEMPERATURE: 98 F | BODY MASS INDEX: 23.99 KG/M2 | SYSTOLIC BLOOD PRESSURE: 113 MMHG | OXYGEN SATURATION: 100 % | DIASTOLIC BLOOD PRESSURE: 65 MMHG | WEIGHT: 119 LBS

## 2019-02-09 DIAGNOSIS — Z30.2 ENCOUNTER FOR STERILIZATION: ICD-10-CM

## 2019-02-09 DIAGNOSIS — Z98.891 PREVIOUS CESAREAN SECTION: ICD-10-CM

## 2019-02-09 LAB
ABO GROUP BLD: NORMAL
BILIRUB BLD-MCNC: NEGATIVE MG/DL
BLD GP AB SCN SERPL QL: NEGATIVE
CLARITY, POC: CLEAR
COLOR UR: YELLOW
GLUCOSE BLDC GLUCOMTR-MCNC: 86 MG/DL (ref 70–130)
GLUCOSE UR STRIP-MCNC: NEGATIVE MG/DL
KETONES UR QL: NEGATIVE
LEUKOCYTE EST, POC: NEGATIVE
NITRITE UR-MCNC: NEGATIVE MG/ML
PH UR: 8 [PH] (ref 5–8)
PROT UR STRIP-MCNC: ABNORMAL MG/DL
RBC # UR STRIP: NEGATIVE /UL
RH BLD: POSITIVE
SP GR UR: 1.01 (ref 1–1.03)
T&S EXPIRATION DATE: NORMAL
UROBILINOGEN UR QL: NORMAL

## 2019-02-09 PROCEDURE — 59025 FETAL NON-STRESS TEST: CPT

## 2019-02-09 PROCEDURE — 81002 URINALYSIS NONAUTO W/O SCOPE: CPT | Performed by: OBSTETRICS & GYNECOLOGY

## 2019-02-09 PROCEDURE — 86901 BLOOD TYPING SEROLOGIC RH(D): CPT

## 2019-02-09 PROCEDURE — 36415 COLL VENOUS BLD VENIPUNCTURE: CPT

## 2019-02-09 PROCEDURE — G0463 HOSPITAL OUTPT CLINIC VISIT: HCPCS

## 2019-02-09 PROCEDURE — 86900 BLOOD TYPING SEROLOGIC ABO: CPT

## 2019-02-09 PROCEDURE — 59025 FETAL NON-STRESS TEST: CPT | Performed by: NURSE PRACTITIONER

## 2019-02-09 PROCEDURE — 86850 RBC ANTIBODY SCREEN: CPT

## 2019-02-09 PROCEDURE — 82962 GLUCOSE BLOOD TEST: CPT

## 2019-02-09 NOTE — NURSING NOTE
YEN MILLAN notified of pt status new orders received for random fingerstick glucose and VE.  14:30 YEN MILLAN notified blood sugar 86 and no change in VE Discharge orders received pt was offered food before she left but said she would get something after she  left  14:40 THI's given pt VU

## 2019-02-09 NOTE — NON STRESS TEST
Triage Note - Nursing Documentation  Labor and Delivery Admission Log    Cortney Boucher  : 1995  MRN: [id number]  CSN: [id number]    Date in / Time in:  2019  Time in: 12:47  Date out / Time out:    Time out: 14:45  Nurse: Lisa Clemente, RN    Patient Info: She is a 23 y.o. year old  at 37w2d with an ELYSSA of 2019, by Ultrasound who was seen on the [facility].    Chief Complaint:   Chief Complaint   Patient presents with   • Non-stress Test     IUGR       Provider Instructions / Disposition: NST po hydrate VE (no change) fingerstick blood sugar 86 home. Repeat C/s and tubal Monday      Patient Active Problem List   Diagnosis   • HPV in female   • Epigastric pain   • Left upper quadrant pain   • Nausea   • Heartburn   • Constipation   • Ovarian cyst   • Hx of  section   • Rubella non-immune status, antepartum   • Vulvovaginal candidiasis   • Gestational diabetes mellitus, class A2   • Previous  section   • Encounter for sterilization       NST Documentation (Only applicable > 32 weeks): Interpretation A  Nonstress Test Interpretation A: Reactive (19 1450 : Lisa Clemente, RN)

## 2019-02-10 NOTE — H&P
BRYAN Willard  Cortney Boucher  : 1995  MRN: [id number]  CSN: [id number]    History and Physical    Subjective   Cortney Boucher is a 23 y.o. year old  who present for surgery due to prior C/S, desired permanent sterilization, A2GDM, advanced cervical dilation with H/O PTL, variable fetal lie.    Past Medical History:   Diagnosis Date   • Abnormal Pap smear of cervix 2015    ASCUS +HPV   • Anxiety and depression    • Body piercing     LOWER LIP, TOP OF LEFT EAR   • Constipation    • Depression    • GERD (gastroesophageal reflux disease)    • Hearing loss     REPORTS USES NO HEARING DEVICES   • History of blood transfusion 2016    REPORTS NO REACTION   • History of Papanicolaou smear of cervix 2016    NORMAL    • HPV in female    • Hypoglycemia    • Migraine    • Ovarian cyst 2017    RIGHT    • Rubella non-immune status, antepartum 2018   • Tattoos     1 ON LEFT WRIST, BILATERAL SHOULDER BLADES, LEFT LEG     Past Surgical History:   Procedure Laterality Date   •  SECTION  2016    DR MARC SALINAS   • EAR TUBES       Social History    Tobacco Use      Smoking status: Never Smoker      Smokeless tobacco: Never Used    No current facility-administered medications for this encounter.     Current Outpatient Medications:   •  benzonatate (TESSALON PERLES) 100 MG capsule, Take 1 capsule by mouth 3 (Three) Times a Day As Needed for Cough., Disp: 60 capsule, Rfl: 0  •  Blood Glucose Monitoring Suppl (ONE TOUCH ULTRA 2) w/Device kit, USE TO CHECK BLOOD GLUCOSE ONCE DAILY OR AS DIRECTED, Disp: , Rfl: 0  •  docusate sodium (COLACE) 100 MG capsule, Take 1 capsule by mouth 2 (Two) Times a Day., Disp: 60 capsule, Rfl: 3  •  doxylamine (UNISOM) 25 MG tablet, Take 1 tablet by mouth Every Night., Disp: 30 tablet, Rfl: 5  •  ferrous sulfate 325 (65 FE) MG tablet, Take 1 tablet by mouth 2 (Two) Times a Day Before Meals., Disp: 60 tablet, Rfl: 6  •  glucose blood  (IGLUCOSE TEST STRIPS) test strip, Use as instructed, Disp: 100 each, Rfl: 12  •  glyBURIDE (DIABETA) 2.5 MG tablet, Take 1 tablet by mouth Every Night., Disp: 30 tablet, Rfl: 5  •  Lancets (FREESTYLE) lancets, Use as directed by your physician, Disp: 100 each, Rfl: 12  •  ondansetron (ZOFRAN) 4 MG tablet, Take 1 tablet by mouth Every 6 (Six) Hours As Needed for Nausea or Vomiting., Disp: 20 tablet, Rfl: 3  •  Prenatal Vit-Fe Fumarate-FA (PRENATAL VITAMINS) 28-0.8 MG tablet, Take 1 tablet by mouth Daily., Disp: 90 tablet, Rfl: 3  •  psyllium (METAMUCIL SMOOTH TEXTURE) 28 % packet, Take 1 packet by mouth Daily., Disp: 30 each, Rfl: 11  •  vitamin B-6 (PYRIDOXINE) 25 MG tablet, Take 1 tablet by mouth every night at bedtime., Disp: 30 tablet, Rfl: 5    Allergies   Allergen Reactions   • Latex Hives       Review of Systems   Constitutional: Negative.    HENT: Negative.    Respiratory: Negative.    Cardiovascular: Negative.          Objective   LMP 05/28/2018   General: well developed; well nourished  no acute distress   Heart: regular rate and rhythm, S1, S2 normal, no murmur, click, rub or gallop   Lungs: breathing is unlabored  clear to auscultation bilaterally   Abdomen: soft, non-tender; no masses  no umbilical or inguinal hernias are present  no hepato-splenomegaly   Pelvis:: Not performed.   Labs  Lab Results   Component Value Date     01/22/2019    HGB 8.9 (L) 01/22/2019    HCT 29.7 (L) 01/22/2019    WBC 17.10 (H) 01/22/2019     (L) 01/22/2019    K 3.8 01/22/2019     01/22/2019    CO2 21.0 (L) 01/22/2019    BUN 5 (L) 01/22/2019    CREATININE 0.50 (L) 01/22/2019    GLUCOSE 79 01/22/2019    ALBUMIN 3.40 (L) 01/22/2019    CALCIUM 8.1 (L) 01/22/2019    AST 20 01/22/2019    ALT 11 (L) 01/22/2019    BILITOT 0.4 01/22/2019        Assessment   1. IUP @ 37 + weeks  2. Prior C/S  3. A2GDM  4. Advanced cervical dilation with h/o PTL  5. Variable fetal lie  6. Desired permanent sterilization     Plan    1. R C/S, BTL   2. Risks, complications, benefits, and other alternatives discussed.    Luis Miguel Martel MD  2/10/2019  5:17 PM

## 2019-02-11 ENCOUNTER — ANESTHESIA EVENT (OUTPATIENT)
Dept: LABOR AND DELIVERY | Facility: HOSPITAL | Age: 24
End: 2019-02-11

## 2019-02-11 ENCOUNTER — HOSPITAL ENCOUNTER (INPATIENT)
Facility: HOSPITAL | Age: 24
LOS: 3 days | Discharge: HOME | End: 2019-02-14
Attending: OBSTETRICS & GYNECOLOGY | Admitting: OBSTETRICS & GYNECOLOGY

## 2019-02-11 ENCOUNTER — ANESTHESIA (OUTPATIENT)
Dept: LABOR AND DELIVERY | Facility: HOSPITAL | Age: 24
End: 2019-02-11

## 2019-02-11 DIAGNOSIS — D50.9 IRON DEFICIENCY ANEMIA, UNSPECIFIED IRON DEFICIENCY ANEMIA TYPE: ICD-10-CM

## 2019-02-11 DIAGNOSIS — Z30.2 ENCOUNTER FOR STERILIZATION: ICD-10-CM

## 2019-02-11 DIAGNOSIS — Z98.891 PREVIOUS CESAREAN SECTION: ICD-10-CM

## 2019-02-11 LAB
BASOPHILS # BLD AUTO: 0.02 10*3/MM3 (ref 0–0.2)
BASOPHILS NFR BLD AUTO: 0.1 % (ref 0–2.5)
DEPRECATED RDW RBC AUTO: 43.4 FL (ref 37–54)
EOSINOPHIL # BLD AUTO: 0.1 10*3/MM3 (ref 0–0.7)
EOSINOPHIL NFR BLD AUTO: 0.7 % (ref 0–7)
ERYTHROCYTE [DISTWIDTH] IN BLOOD BY AUTOMATED COUNT: 17.4 % (ref 11.5–14.5)
GLUCOSE BLDC GLUCOMTR-MCNC: 80 MG/DL (ref 70–130)
HCT VFR BLD AUTO: 28.8 % (ref 37–47)
HGB BLD-MCNC: 8.8 G/DL (ref 12–16)
IMM GRANULOCYTES # BLD AUTO: 0.1 10*3/MM3 (ref 0–0.06)
IMM GRANULOCYTES NFR BLD AUTO: 0.7 % (ref 0–0.6)
LYMPHOCYTES # BLD AUTO: 1.53 10*3/MM3 (ref 0.6–3.4)
LYMPHOCYTES NFR BLD AUTO: 10.1 % (ref 10–50)
MCH RBC QN AUTO: 21.4 PG (ref 27–31)
MCHC RBC AUTO-ENTMCNC: 30.6 G/DL (ref 30–37)
MCV RBC AUTO: 69.9 FL (ref 81–99)
MONOCYTES # BLD AUTO: 0.65 10*3/MM3 (ref 0–0.9)
MONOCYTES NFR BLD AUTO: 4.3 % (ref 0–12)
NEUTROPHILS # BLD AUTO: 12.75 10*3/MM3 (ref 2–6.9)
NEUTROPHILS NFR BLD AUTO: 84.1 % (ref 37–80)
NRBC BLD AUTO-RTO: 0.3 /100 WBC (ref 0–0)
PLATELET # BLD AUTO: 116 10*3/MM3 (ref 130–400)
PMV BLD AUTO: 10.5 FL (ref 6–12)
RBC # BLD AUTO: 4.12 10*6/MM3 (ref 4.2–5.4)
WBC NRBC COR # BLD: 15.15 10*3/MM3 (ref 4.8–10.8)

## 2019-02-11 PROCEDURE — 85007 BL SMEAR W/DIFF WBC COUNT: CPT | Performed by: OBSTETRICS & GYNECOLOGY

## 2019-02-11 PROCEDURE — 25010000002 MIDAZOLAM PER 1 MG: Performed by: NURSE ANESTHETIST, CERTIFIED REGISTERED

## 2019-02-11 PROCEDURE — 85025 COMPLETE CBC W/AUTO DIFF WBC: CPT | Performed by: OBSTETRICS & GYNECOLOGY

## 2019-02-11 PROCEDURE — 25010000002 HYDROMORPHONE 1 MG/ML SOLUTION: Performed by: NURSE ANESTHETIST, CERTIFIED REGISTERED

## 2019-02-11 PROCEDURE — 51703 INSERT BLADDER CATH COMPLEX: CPT

## 2019-02-11 PROCEDURE — 59025 FETAL NON-STRESS TEST: CPT | Performed by: MIDWIFE

## 2019-02-11 PROCEDURE — 59515 CESAREAN DELIVERY: CPT | Performed by: OBSTETRICS & GYNECOLOGY

## 2019-02-11 PROCEDURE — 25010000003 CEFAZOLIN SODIUM-DEXTROSE 2-3 GM-%(50ML) RECONSTITUTED SOLUTION: Performed by: OBSTETRICS & GYNECOLOGY

## 2019-02-11 PROCEDURE — 0UB70ZZ EXCISION OF BILATERAL FALLOPIAN TUBES, OPEN APPROACH: ICD-10-PCS | Performed by: OBSTETRICS & GYNECOLOGY

## 2019-02-11 PROCEDURE — 25010000002 NALBUPHINE PER 10 MG: Performed by: NURSE ANESTHETIST, CERTIFIED REGISTERED

## 2019-02-11 PROCEDURE — 25010000002 ONDANSETRON PER 1 MG: Performed by: NURSE ANESTHETIST, CERTIFIED REGISTERED

## 2019-02-11 PROCEDURE — 82962 GLUCOSE BLOOD TEST: CPT

## 2019-02-11 PROCEDURE — 25010000002 FENTANYL CITRATE (PF) 100 MCG/2ML SOLUTION: Performed by: NURSE ANESTHETIST, CERTIFIED REGISTERED

## 2019-02-11 PROCEDURE — 59025 FETAL NON-STRESS TEST: CPT

## 2019-02-11 DEVICE — CLIP FALLOP FILSHIE TI PR STRL BX/20: Type: IMPLANTABLE DEVICE | Site: FALLOPIAN TUBE | Status: FUNCTIONAL

## 2019-02-11 RX ORDER — SODIUM CHLORIDE, SODIUM LACTATE, POTASSIUM CHLORIDE, CALCIUM CHLORIDE 600; 310; 30; 20 MG/100ML; MG/100ML; MG/100ML; MG/100ML
125 INJECTION, SOLUTION INTRAVENOUS CONTINUOUS
Status: DISCONTINUED | OUTPATIENT
Start: 2019-02-11 | End: 2019-02-11

## 2019-02-11 RX ORDER — LANOLIN
CREAM (GRAM) TOPICAL
Status: DISCONTINUED | OUTPATIENT
Start: 2019-02-11 | End: 2019-02-14 | Stop reason: HOSPADM

## 2019-02-11 RX ORDER — CARBOPROST TROMETHAMINE 250 UG/ML
250 INJECTION, SOLUTION INTRAMUSCULAR AS NEEDED
Status: DISCONTINUED | OUTPATIENT
Start: 2019-02-11 | End: 2019-02-14 | Stop reason: HOSPADM

## 2019-02-11 RX ORDER — PROMETHAZINE HYDROCHLORIDE 25 MG/ML
12.5 INJECTION, SOLUTION INTRAMUSCULAR; INTRAVENOUS EVERY 4 HOURS PRN
Status: DISCONTINUED | OUTPATIENT
Start: 2019-02-11 | End: 2019-02-13

## 2019-02-11 RX ORDER — CEFAZOLIN SODIUM 2 G/50ML
2 SOLUTION INTRAVENOUS ONCE
Status: COMPLETED | OUTPATIENT
Start: 2019-02-11 | End: 2019-02-11

## 2019-02-11 RX ORDER — PROMETHAZINE HYDROCHLORIDE 12.5 MG/1
12.5 SUPPOSITORY RECTAL EVERY 6 HOURS PRN
Status: DISCONTINUED | OUTPATIENT
Start: 2019-02-11 | End: 2019-02-14 | Stop reason: HOSPADM

## 2019-02-11 RX ORDER — NALOXONE HCL 0.4 MG/ML
0.4 VIAL (ML) INJECTION
Status: DISCONTINUED | OUTPATIENT
Start: 2019-02-11 | End: 2019-02-13

## 2019-02-11 RX ORDER — FAMOTIDINE 10 MG/ML
20 INJECTION, SOLUTION INTRAVENOUS ONCE
Status: COMPLETED | OUTPATIENT
Start: 2019-02-11 | End: 2019-02-11

## 2019-02-11 RX ORDER — DOCUSATE SODIUM 100 MG/1
100 CAPSULE, LIQUID FILLED ORAL 2 TIMES DAILY PRN
Status: DISCONTINUED | OUTPATIENT
Start: 2019-02-11 | End: 2019-02-14 | Stop reason: HOSPADM

## 2019-02-11 RX ORDER — ONDANSETRON 2 MG/ML
4 INJECTION INTRAMUSCULAR; INTRAVENOUS EVERY 6 HOURS PRN
Status: DISCONTINUED | OUTPATIENT
Start: 2019-02-11 | End: 2019-02-13

## 2019-02-11 RX ORDER — SODIUM CHLORIDE 9 MG/ML
125 INJECTION, SOLUTION INTRAVENOUS CONTINUOUS
Status: DISCONTINUED | OUTPATIENT
Start: 2019-02-11 | End: 2019-02-14 | Stop reason: HOSPADM

## 2019-02-11 RX ORDER — MORPHINE SULFATE 2 MG/ML
6 INJECTION, SOLUTION INTRAMUSCULAR; INTRAVENOUS
Status: DISCONTINUED | OUTPATIENT
Start: 2019-02-11 | End: 2019-02-13

## 2019-02-11 RX ORDER — MAGNESIUM HYDROXIDE 1200 MG/15ML
LIQUID ORAL AS NEEDED
Status: DISCONTINUED | OUTPATIENT
Start: 2019-02-11 | End: 2019-02-14 | Stop reason: HOSPADM

## 2019-02-11 RX ORDER — NALBUPHINE HCL 10 MG/ML
2.5 AMPUL (ML) INJECTION EVERY 4 HOURS PRN
Status: DISPENSED | OUTPATIENT
Start: 2019-02-11 | End: 2019-02-12

## 2019-02-11 RX ORDER — MIDAZOLAM HYDROCHLORIDE 1 MG/ML
INJECTION INTRAMUSCULAR; INTRAVENOUS AS NEEDED
Status: DISCONTINUED | OUTPATIENT
Start: 2019-02-11 | End: 2019-02-11 | Stop reason: SURG

## 2019-02-11 RX ORDER — LIDOCAINE HYDROCHLORIDE 10 MG/ML
5 INJECTION, SOLUTION EPIDURAL; INFILTRATION; INTRACAUDAL; PERINEURAL AS NEEDED
Status: DISCONTINUED | OUTPATIENT
Start: 2019-02-11 | End: 2019-02-11 | Stop reason: HOSPADM

## 2019-02-11 RX ORDER — MISOPROSTOL 200 UG/1
800 TABLET ORAL AS NEEDED
Status: DISCONTINUED | OUTPATIENT
Start: 2019-02-11 | End: 2019-02-14 | Stop reason: HOSPADM

## 2019-02-11 RX ORDER — METHYLERGONOVINE MALEATE 0.2 MG/ML
200 INJECTION INTRAVENOUS ONCE AS NEEDED
Status: DISCONTINUED | OUTPATIENT
Start: 2019-02-11 | End: 2019-02-14 | Stop reason: HOSPADM

## 2019-02-11 RX ORDER — ONDANSETRON 2 MG/ML
INJECTION INTRAMUSCULAR; INTRAVENOUS AS NEEDED
Status: DISCONTINUED | OUTPATIENT
Start: 2019-02-11 | End: 2019-02-11 | Stop reason: SURG

## 2019-02-11 RX ORDER — SODIUM CHLORIDE 0.9 % (FLUSH) 0.9 %
1-10 SYRINGE (ML) INJECTION AS NEEDED
Status: DISCONTINUED | OUTPATIENT
Start: 2019-02-11 | End: 2019-02-11 | Stop reason: HOSPADM

## 2019-02-11 RX ORDER — PROMETHAZINE HYDROCHLORIDE 12.5 MG/1
12.5 TABLET ORAL EVERY 6 HOURS PRN
Status: DISCONTINUED | OUTPATIENT
Start: 2019-02-11 | End: 2019-02-14 | Stop reason: HOSPADM

## 2019-02-11 RX ORDER — PROMETHAZINE HYDROCHLORIDE 25 MG/ML
25 INJECTION, SOLUTION INTRAMUSCULAR; INTRAVENOUS 4 TIMES DAILY PRN
Status: DISCONTINUED | OUTPATIENT
Start: 2019-02-11 | End: 2019-02-14 | Stop reason: HOSPADM

## 2019-02-11 RX ORDER — ONDANSETRON 4 MG/1
4 TABLET, ORALLY DISINTEGRATING ORAL EVERY 6 HOURS PRN
Status: DISCONTINUED | OUTPATIENT
Start: 2019-02-11 | End: 2019-02-14 | Stop reason: HOSPADM

## 2019-02-11 RX ORDER — BUPIVACAINE HYDROCHLORIDE 7.5 MG/ML
INJECTION, SOLUTION EPIDURAL; RETROBULBAR
Status: COMPLETED | OUTPATIENT
Start: 2019-02-11 | End: 2019-02-11

## 2019-02-11 RX ORDER — FENTANYL CITRATE 50 UG/ML
INJECTION, SOLUTION INTRAMUSCULAR; INTRAVENOUS AS NEEDED
Status: DISCONTINUED | OUTPATIENT
Start: 2019-02-11 | End: 2019-02-11 | Stop reason: SURG

## 2019-02-11 RX ORDER — SODIUM CHLORIDE 0.9 % (FLUSH) 0.9 %
3 SYRINGE (ML) INJECTION EVERY 12 HOURS SCHEDULED
Status: DISCONTINUED | OUTPATIENT
Start: 2019-02-11 | End: 2019-02-11 | Stop reason: HOSPADM

## 2019-02-11 RX ORDER — ONDANSETRON 4 MG/1
4 TABLET, FILM COATED ORAL EVERY 6 HOURS PRN
Status: DISCONTINUED | OUTPATIENT
Start: 2019-02-11 | End: 2019-02-14 | Stop reason: HOSPADM

## 2019-02-11 RX ORDER — NALOXONE HCL 0.4 MG/ML
0.4 VIAL (ML) INJECTION
Status: ACTIVE | OUTPATIENT
Start: 2019-02-11 | End: 2019-02-12

## 2019-02-11 RX ORDER — ONDANSETRON 2 MG/ML
4 INJECTION INTRAMUSCULAR; INTRAVENOUS ONCE AS NEEDED
Status: ACTIVE | OUTPATIENT
Start: 2019-02-11 | End: 2019-02-12

## 2019-02-11 RX ORDER — OXYCODONE HYDROCHLORIDE AND ACETAMINOPHEN 5; 325 MG/1; MG/1
1 TABLET ORAL EVERY 4 HOURS PRN
Status: DISCONTINUED | OUTPATIENT
Start: 2019-02-11 | End: 2019-02-14 | Stop reason: HOSPADM

## 2019-02-11 RX ORDER — IBUPROFEN 600 MG/1
600 TABLET ORAL EVERY 8 HOURS PRN
Status: DISCONTINUED | OUTPATIENT
Start: 2019-02-11 | End: 2019-02-14

## 2019-02-11 RX ORDER — OXYCODONE AND ACETAMINOPHEN 7.5; 325 MG/1; MG/1
1 TABLET ORAL EVERY 4 HOURS PRN
Status: DISCONTINUED | OUTPATIENT
Start: 2019-02-11 | End: 2019-02-14 | Stop reason: HOSPADM

## 2019-02-11 RX ORDER — MORPHINE SULFATE 2 MG/ML
2 INJECTION, SOLUTION INTRAMUSCULAR; INTRAVENOUS EVERY 4 HOURS PRN
Status: DISCONTINUED | OUTPATIENT
Start: 2019-02-11 | End: 2019-02-12 | Stop reason: SDUPTHER

## 2019-02-11 RX ORDER — TRISODIUM CITRATE DIHYDRATE AND CITRIC ACID MONOHYDRATE 500; 334 MG/5ML; MG/5ML
30 SOLUTION ORAL ONCE
Status: COMPLETED | OUTPATIENT
Start: 2019-02-11 | End: 2019-02-11

## 2019-02-11 RX ORDER — SIMETHICONE 80 MG
80 TABLET,CHEWABLE ORAL 4 TIMES DAILY PRN
Status: DISCONTINUED | OUTPATIENT
Start: 2019-02-11 | End: 2019-02-14 | Stop reason: HOSPADM

## 2019-02-11 RX ORDER — OXYTOCIN 10 [USP'U]/ML
INJECTION, SOLUTION INTRAMUSCULAR; INTRAVENOUS AS NEEDED
Status: DISCONTINUED | OUTPATIENT
Start: 2019-02-11 | End: 2019-02-11 | Stop reason: SURG

## 2019-02-11 RX ADMIN — FAMOTIDINE 20 MG: 10 INJECTION INTRAVENOUS at 13:51

## 2019-02-11 RX ADMIN — SODIUM CITRATE AND CITRIC ACID MONOHYDRATE 30 ML: 500; 334 SOLUTION ORAL at 13:51

## 2019-02-11 RX ADMIN — OXYTOCIN 20 UNITS: 10 INJECTION INTRAVENOUS at 14:38

## 2019-02-11 RX ADMIN — SODIUM CHLORIDE 125 ML/HR: 9 INJECTION, SOLUTION INTRAVENOUS at 17:30

## 2019-02-11 RX ADMIN — MIDAZOLAM HYDROCHLORIDE 2 MG: 1 INJECTION, SOLUTION INTRAMUSCULAR; INTRAVENOUS at 14:38

## 2019-02-11 RX ADMIN — CEFAZOLIN SODIUM 2 G: 2 SOLUTION INTRAVENOUS at 14:20

## 2019-02-11 RX ADMIN — NALBUPHINE HYDROCHLORIDE 2.5 MG: 10 INJECTION, SOLUTION INTRAMUSCULAR; INTRAVENOUS; SUBCUTANEOUS at 16:48

## 2019-02-11 RX ADMIN — ONDANSETRON 8 MG: 2 INJECTION INTRAMUSCULAR; INTRAVENOUS at 14:20

## 2019-02-11 RX ADMIN — FENTANYL CITRATE 50 MCG: 50 INJECTION, SOLUTION INTRAMUSCULAR; INTRAVENOUS at 14:38

## 2019-02-11 RX ADMIN — BUPIVACAINE HYDROCHLORIDE 2 ML: 7.5 INJECTION, SOLUTION EPIDURAL; RETROBULBAR at 14:22

## 2019-02-11 RX ADMIN — SODIUM CHLORIDE, POTASSIUM CHLORIDE, SODIUM LACTATE AND CALCIUM CHLORIDE 2000 ML: 600; 310; 30; 20 INJECTION, SOLUTION INTRAVENOUS at 13:54

## 2019-02-11 RX ADMIN — HYDROMORPHONE HYDROCHLORIDE 0.25 MG: 1 INJECTION, SOLUTION INTRAMUSCULAR; INTRAVENOUS; SUBCUTANEOUS at 17:15

## 2019-02-11 RX ADMIN — SODIUM CHLORIDE 125 ML/HR: 9 INJECTION, SOLUTION INTRAVENOUS at 17:29

## 2019-02-11 NOTE — NON STRESS TEST
"  Cortney Hardyeboni Boucher, a  at 37w4d with an ELYSSA of 2019, by Ultrasound, was seen at [facility] for a nonstress test.    Chief Complaint   Patient presents with   • Scheduled      \"I am here for my  today\"       Patient Active Problem List   Diagnosis   • HPV in female   • Epigastric pain   • Left upper quadrant pain   • Nausea   • Heartburn   • Constipation   • Ovarian cyst   • Hx of  section   • Rubella non-immune status, antepartum   • Vulvovaginal candidiasis   • Gestational diabetes mellitus, class A2   • Previous  section   • Encounter for sterilization       NST reactive               "

## 2019-02-11 NOTE — OP NOTE
Sudheer Boucher  : 1995  MRN: [id number]  CSN: [id number]    Operative Report    Pre-Operative Dx:   1. IUP at 37w4d weeks   2. Previous  section - declines   3. Advanced cervical dilatation with a history of  labor, unstable fetal lie, A2 gestational diabetes  4. Desires sterilization      Postoperative dx:    1. Same as above     Procedure: Repeat  (LTCS) with bilateral placement of filshie clips       Surgeon: Luis Miguel Martel MD   Assistant: none       Anesthesia: Spinal        EBL: <950 mls.       Antibiotics: cefazolin 2 gms     Drains: Ceballos     Findings: VMI, 3 VC, intact placenta, normal adnexa       Indications: Patient's 23-year-old  prior  section presents for repeat and tubal ligation at term.  This brace is incompetent by  labor admitted with advanced cervical dilatation.  She's had A2 gestational diabetes with moderate glycemic control.  Patient also had a unstable variable fetal lie.  For all his reason she was brought in for 39 weeks.  Risk benefits alternatives were discussed at length all courses were answered.             Procedure Details:   After the appropriate time out, the patient was prepped and draped in the usual sterile fashion.  She had been placed in the dorsal supine left lateral tilt position.  A ceballos catheter had been placed in the bladder for drainage during the procedure. A pfannenstiel skin incision was made with a knife and carried down to the fascia.  The fascia was nicked with a scalpel and the incision was extended bilaterally with curved Phan scissors. The superior aspect of the fascia was grasped with Kocher clamps x 2 and bluntly as well as sharply dissected away from the underlying rectus muscles.  A similar process was repeated inferiorly. The rectus muscles were  and the peritoneal cavity was entered sharply without complications. The bladder flap was created with  Metzenbaum scissors and pickups with teeth.  Omental adhesions were taken down with Bovie electrocautery The  retractor was placed and after checking for no entrapment, was retracted down.  A transverse uterine incision was made with the knife and extended bilaterally.  The infant was delivered from the vertex presentation.  The mouth and nose were bulb suctioned.  The cord was doubly clamped and cut and the infant handed to the pediatric nurse in attendance.  Cord blood was obtained.  The placenta was manually extracted from the uterus.  The uterus was then elevated from the abdomen and wiped free of remaining membranes.  The uterine incision was closed with #1 chromic in a running locking fashion with a second imbricating stitch.  The uterus had been returned to the abdomen.  The lateral gutters were wiped free of remaining clots.  The site of surgical incision was inspected and noted to be hemostatic.  Bilateral Filshie clip culture place approximately ampullary portions of the tubes after identifying the fimbriated ends respectfully.  There are no compilations.  The  retractor was removed.  The subfascial tissue was inspected and noted to be hemostatic.  Peritoneum was closed with 3-0 Vicryl suture in a running fashion.  The fascia was closed with 0 PDS in a running non-locking fashion.  Subcutaneous tissue was inspected and noted to be hemostatic with minimal bovie electrocautery.  Shyann's fascia was closed with 3-0 chromic in a running non-locking fashion.  The skin was approximated with staples. Dressings were placed.  All instrument and sponge counts were correct at the end of the procedure. The patient tolerated the procedure well.  There were no complications.  She was taken to postoperative recovery room in stable condition.          Complications:   None      Disposition:   Mother to Mother Baby/Postpartum  in stable condition currently.   Baby to NBN  in stable condition currently.      Luis Miguel Martel MD  2/11/2019  3:11 PM

## 2019-02-11 NOTE — ANESTHESIA PROCEDURE NOTES
Spinal Block    Pre-sedation assessment completed: 2/11/2019 2:22 PM    Patient reassessed immediately prior to procedure    Patient location during procedure: OR  Start Time: 2/11/2019 2:18 PM  Stop Time: 2/11/2019 2:20 PM  Indication:at surgeon's request and procedure for pain  Performed By  CRNA: Chucho Self CRNA  Preanesthetic Checklist  Completed: patient identified, site marked, surgical consent, pre-op evaluation, timeout performed, IV checked, risks and benefits discussed and monitors and equipment checked  Spinal Block Prep:  Patient Position:sitting  Sterile Tech:cap, gloves, mask and sterile barriers  Prep:Chloraprep  Patient Monitoring:blood pressure monitoring, continuous pulse oximetry and EKG  Spinal Block Procedure  Approach:midline  Guidance:landmark technique and palpation technique  Location:L3-L4  Needle Type:Pencan  Needle Gauge:25 G  Placement of Spinal needle event:cerebrospinal fluid aspirated  Paresthesia: no  Fluid Appearance:clear  Medications: bupivacaine PF (MARCAINE) injection 0.75%, 2 mL   Post Assessment  Patient Tolerance:patient tolerated the procedure well with no apparent complications  Complications no  Additional Notes  Risks of spinal anesthesia discussed , including but not limited to:  Headache, itching, nausea and vomiting, infection, failure of the block, decreased BP, permanent chronic back pain, nerve damage, paralysis, etc.    Skin localized with lidocaine skin wheal.    SPINAL INJECTED INTRATHECALLY WITH    1.0 ml 0.75% Bupivacaine with dextrose  ml 0.5% Bupivacaine PF  0.1 ml of Epinephrine 1:1000  .2 mg Morphine PF

## 2019-02-11 NOTE — ANESTHESIA PREPROCEDURE EVALUATION
Anesthesia Evaluation     Patient summary reviewed   no history of anesthetic complications:  NPO Solid Status: > 8 hours  NPO Liquid Status: > 8 hours           Airway   Dental      Pulmonary    (-) not a smoker  Cardiovascular         Neuro/Psych  (+) headaches, psychiatric history Depression and Anxiety,     GI/Hepatic/Renal/Endo    (+)  GERD,  diabetes mellitus gestational,     Musculoskeletal     Abdominal    Substance History      OB/GYN          Other                        Anesthesia Plan    ASA 2     spinal   (Risks discussed , including but not limited to:  Headache, itching, n&v, infection, failure, decreased blood pressure, permanent/chronic back pain, nerve damage, paralysis, etc. All questions answered and informed consent obtained. )  intravenous induction   Anesthetic plan, all risks, benefits, and alternatives have been provided, discussed and informed consent has been obtained with: patient.

## 2019-02-11 NOTE — ANESTHESIA POSTPROCEDURE EVALUATION
Patient: Cortney Boucher    Procedure Summary     Date:  19 Room / Location:  [facility] / [facility]    Anesthesia Start:  141 Anesthesia Stop:      Procedure:   SECTION REPEAT WITH TUBAL (N/A Abdomen) Diagnosis:       Previous  section      Encounter for sterilization      (Previous  section [Z98.891])      (Encounter for sterilization [Z30.2])    Surgeon:  Luis Miguel Martel MD Provider:  Chucho Self CRNA    Anesthesia Type:  spinal ASA Status:  2          Anesthesia Type: spinal  Last vitals  BP   119/69 (19 0200)   Temp   98.6 °F (37 °C) (19)   Pulse   81 (19 020)   Resp   20 (19)     SpO2   97 % (19 0300)     Post Anesthesia Care and Evaluation    Patient location during evaluation: PACU  Patient participation: complete - patient participated  Level of consciousness: awake and alert and awake  Pain score: 3  Pain management: adequate  Airway patency: patent  Anesthetic complications: No anesthetic complications  PONV Status: controlled  Cardiovascular status: acceptable, hemodynamically stable and stable  Respiratory status: acceptable and nasal cannula  Hydration status: acceptable  Post Neuraxial Block status: No signs or symptoms of PDPH

## 2019-02-12 LAB
ANISOCYTOSIS BLD QL: NORMAL
BASOPHILS # BLD AUTO: 0.02 10*3/MM3 (ref 0–0.2)
BASOPHILS NFR BLD AUTO: 0.1 % (ref 0–2.5)
DEPRECATED RDW RBC AUTO: 44.2 FL (ref 37–54)
EOSINOPHIL # BLD AUTO: 0.07 10*3/MM3 (ref 0–0.7)
EOSINOPHIL NFR BLD AUTO: 0.4 % (ref 0–7)
ERYTHROCYTE [DISTWIDTH] IN BLOOD BY AUTOMATED COUNT: 17.3 % (ref 11.5–14.5)
GLUCOSE BLDC GLUCOMTR-MCNC: 98 MG/DL (ref 70–130)
HCT VFR BLD AUTO: 25.3 % (ref 37–47)
HGB BLD-MCNC: 7.3 G/DL (ref 12–16)
IMM GRANULOCYTES # BLD AUTO: 0.11 10*3/MM3 (ref 0–0.06)
IMM GRANULOCYTES NFR BLD AUTO: 0.7 % (ref 0–0.6)
LYMPHOCYTES # BLD AUTO: 1.43 10*3/MM3 (ref 0.6–3.4)
LYMPHOCYTES NFR BLD AUTO: 8.7 % (ref 10–50)
MCH RBC QN AUTO: 20.4 PG (ref 27–31)
MCHC RBC AUTO-ENTMCNC: 28.9 G/DL (ref 30–37)
MCV RBC AUTO: 70.9 FL (ref 81–99)
MICROCYTES BLD QL: NORMAL
MONOCYTES # BLD AUTO: 0.61 10*3/MM3 (ref 0–0.9)
MONOCYTES NFR BLD AUTO: 3.7 % (ref 0–12)
NEUTROPHILS # BLD AUTO: 14.16 10*3/MM3 (ref 2–6.9)
NEUTROPHILS NFR BLD AUTO: 86.4 % (ref 37–80)
NRBC BLD AUTO-RTO: 0.2 /100 WBC (ref 0–0)
PLATELET # BLD AUTO: 107 10*3/MM3 (ref 130–400)
PMV BLD AUTO: 10.7 FL (ref 6–12)
POLYCHROMASIA BLD QL SMEAR: NORMAL
RBC # BLD AUTO: 3.57 10*6/MM3 (ref 4.2–5.4)
SMALL PLATELETS BLD QL SMEAR: NORMAL
WBC MORPH BLD: NORMAL
WBC NRBC COR # BLD: 16.4 10*3/MM3 (ref 4.8–10.8)

## 2019-02-12 PROCEDURE — 85007 BL SMEAR W/DIFF WBC COUNT: CPT | Performed by: OBSTETRICS & GYNECOLOGY

## 2019-02-12 PROCEDURE — 82962 GLUCOSE BLOOD TEST: CPT

## 2019-02-12 PROCEDURE — 25010000002 NALBUPHINE PER 10 MG: Performed by: NURSE ANESTHETIST, CERTIFIED REGISTERED

## 2019-02-12 PROCEDURE — 85025 COMPLETE CBC W/AUTO DIFF WBC: CPT | Performed by: OBSTETRICS & GYNECOLOGY

## 2019-02-12 PROCEDURE — 99024 POSTOP FOLLOW-UP VISIT: CPT | Performed by: NURSE PRACTITIONER

## 2019-02-12 PROCEDURE — 94762 N-INVAS EAR/PLS OXIMTRY CONT: CPT

## 2019-02-12 RX ORDER — FERROUS SULFATE TAB EC 324 MG (65 MG FE EQUIVALENT) 324 (65 FE) MG
324 TABLET DELAYED RESPONSE ORAL 2 TIMES DAILY WITH MEALS
Status: DISCONTINUED | OUTPATIENT
Start: 2019-02-12 | End: 2019-02-14 | Stop reason: HOSPADM

## 2019-02-12 RX ORDER — FERROUS SULFATE TAB EC 324 MG (65 MG FE EQUIVALENT) 324 (65 FE) MG
324 TABLET DELAYED RESPONSE ORAL 2 TIMES DAILY WITH MEALS
Status: DISCONTINUED | OUTPATIENT
Start: 2019-02-12 | End: 2019-02-12 | Stop reason: SDUPTHER

## 2019-02-12 RX ADMIN — IBUPROFEN 600 MG: 600 TABLET, FILM COATED ORAL at 04:52

## 2019-02-12 RX ADMIN — NALBUPHINE HYDROCHLORIDE 2.5 MG: 10 INJECTION, SOLUTION INTRAMUSCULAR; INTRAVENOUS; SUBCUTANEOUS at 00:58

## 2019-02-12 RX ADMIN — Medication 7 APPLICATION: at 10:57

## 2019-02-12 RX ADMIN — IBUPROFEN 600 MG: 600 TABLET, FILM COATED ORAL at 15:03

## 2019-02-12 RX ADMIN — FERROUS SULFATE TAB EC 324 MG (65 MG FE EQUIVALENT) 324 MG: 324 (65 FE) TABLET DELAYED RESPONSE at 18:32

## 2019-02-12 RX ADMIN — OXYCODONE HYDROCHLORIDE AND ACETAMINOPHEN 1 TABLET: 7.5; 325 TABLET ORAL at 20:09

## 2019-02-12 RX ADMIN — NALBUPHINE HYDROCHLORIDE 2.5 MG: 10 INJECTION, SOLUTION INTRAMUSCULAR; INTRAVENOUS; SUBCUTANEOUS at 09:06

## 2019-02-12 RX ADMIN — DOCUSATE SODIUM 100 MG: 100 CAPSULE, LIQUID FILLED ORAL at 10:57

## 2019-02-12 RX ADMIN — FERROUS SULFATE TAB EC 324 MG (65 MG FE EQUIVALENT) 324 MG: 324 (65 FE) TABLET DELAYED RESPONSE at 10:57

## 2019-02-12 RX ADMIN — OXYCODONE HYDROCHLORIDE AND ACETAMINOPHEN 1 TABLET: 7.5; 325 TABLET ORAL at 03:10

## 2019-02-12 RX ADMIN — OXYCODONE HYDROCHLORIDE AND ACETAMINOPHEN 1 TABLET: 7.5; 325 TABLET ORAL at 15:03

## 2019-02-12 RX ADMIN — Medication 80 MG: at 04:52

## 2019-02-12 RX ADMIN — OXYCODONE HYDROCHLORIDE AND ACETAMINOPHEN 1 TABLET: 7.5; 325 TABLET ORAL at 08:56

## 2019-02-12 RX ADMIN — DOCUSATE SODIUM 100 MG: 100 CAPSULE, LIQUID FILLED ORAL at 20:08

## 2019-02-12 NOTE — PROGRESS NOTES
BRYAN Willard   PROGRESS NOTE    Post-Op Day 1 S/P   Subjective   Subjective  Patient reports:  She states she is doing well.  Ambulating and voiding without difficulty.  Regular diet tolerated + flatus.    Pain control adequate with pain meds given.  Vaginal bleeding minimal.     Objective    Objective     Vitals: Vital Signs Range for the last 24 hours  Temperature: Temp:  [97.4 °F (36.3 °C)-99.2 °F (37.3 °C)] 98.6 °F (37 °C)   Temp Source: Temp src: Oral   BP: BP: (112-134)/(59-92) 119/69   Pulse: Heart Rate:  [] 81   Respirations: Resp:  [16-20] 20   SPO2: SpO2:  [96 %-100 %] 97 %   O2 Amount (l/min):     O2 Devices Device (Oxygen Therapy): room air   Weight:                Physical Exam    Psych: Altert and oriented to time, place and person  Mood and affect appropriate   General: well developed; well nourished  no acute distress  Lungs:  breathing is unlabored  Abdomen: Dressing dry and intact  fundus firm - below the umbilicus  abdomen soft though + distention  Lower Extremities: no calf tenderness    I reviewed the patient's new clinical results.    Assessment/Plan        Previous  section    Assessment & Plan    Assessment:    Cortney Dorado Boucher is Day 1  post-partum  , Low Transverse    .  Anemia     Plan:  continue post op care and start iron .      Kathrin Lopez CNM  19  9:25 AM

## 2019-02-13 PROCEDURE — 99024 POSTOP FOLLOW-UP VISIT: CPT | Performed by: MIDWIFE

## 2019-02-13 RX ADMIN — IBUPROFEN 600 MG: 600 TABLET, FILM COATED ORAL at 01:25

## 2019-02-13 RX ADMIN — OXYCODONE HYDROCHLORIDE AND ACETAMINOPHEN 1 TABLET: 7.5; 325 TABLET ORAL at 23:45

## 2019-02-13 RX ADMIN — FERROUS SULFATE TAB EC 324 MG (65 MG FE EQUIVALENT) 324 MG: 324 (65 FE) TABLET DELAYED RESPONSE at 17:46

## 2019-02-13 RX ADMIN — ONDANSETRON 4 MG: 4 TABLET, FILM COATED ORAL at 17:46

## 2019-02-13 RX ADMIN — FERROUS SULFATE TAB EC 324 MG (65 MG FE EQUIVALENT) 324 MG: 324 (65 FE) TABLET DELAYED RESPONSE at 08:27

## 2019-02-13 RX ADMIN — OXYCODONE HYDROCHLORIDE AND ACETAMINOPHEN 1 TABLET: 7.5; 325 TABLET ORAL at 15:09

## 2019-02-13 RX ADMIN — IBUPROFEN 600 MG: 600 TABLET, FILM COATED ORAL at 19:59

## 2019-02-13 RX ADMIN — OXYCODONE HYDROCHLORIDE AND ACETAMINOPHEN 1 TABLET: 7.5; 325 TABLET ORAL at 01:26

## 2019-02-13 RX ADMIN — OXYCODONE HYDROCHLORIDE AND ACETAMINOPHEN 1 TABLET: 7.5; 325 TABLET ORAL at 19:59

## 2019-02-13 RX ADMIN — IBUPROFEN 600 MG: 600 TABLET, FILM COATED ORAL at 09:21

## 2019-02-13 RX ADMIN — OXYCODONE HYDROCHLORIDE AND ACETAMINOPHEN 1 TABLET: 7.5; 325 TABLET ORAL at 06:30

## 2019-02-13 RX ADMIN — DOCUSATE SODIUM 100 MG: 100 CAPSULE, LIQUID FILLED ORAL at 08:27

## 2019-02-13 NOTE — PROGRESS NOTES
BRYAN Hardyeboni Boucher  : 1995  MRN: [id number]  CSN: [id number]    Post-operative Day #2  Subjective   Her pain is well controlled. She states she is hurting now. Vaginal bleeding is appropriate amount.  She is passing gas and has not had a bowel movement.  Upon review of her respiratory system, she has no respiratory symptoms and and denies SOB, cough, wheezing, chest pain. She is Breastfeeding  and it is going well.     Objective     Min/max vitals past 24 hours:   Temp  Min: 98.2 °F (36.8 °C)  Max: 98.8 °F (37.1 °C)  BP  Min: 118/85  Max: 132/81  Pulse  Min: 78  Max: 96  Resp  Min: 14  Max: 20        General: well developed; well nourished  no acute distress   Resp: breathing is unlabored   CV: Not performed.   Abdomen: soft, non-tender; no masses  incision is intact and has a small amount of dried blood on it.   Pelvic: Not performed   Ext: normal appearance with no cyanosis or edema and no calf tenderness     Results from last 7 days   Lab Units 19  0507 19  1245   WBC 10*3/mm3 16.40* 15.15*   HEMOGLOBIN g/dL 7.3* 8.8*   HEMATOCRIT % 25.3* 28.8*   PLATELETS 10*3/mm3 107* 116*     Lab Results   Component Value Date    ABO B 2019    RH Positive 2019    RUBELLAABIGG <0.90 (L) 2018    HEPBSAG Negative 2018        Assessment   1. POD #2 S/P Repeat  (LTCS) with BTL     Plan   1. Continue routine postpartum care    Cindy Dorado, APRMYRTLE  2019  9:39 AM

## 2019-02-13 NOTE — PLAN OF CARE
Problem: Patient Care Overview  Goal: Plan of Care Review  Outcome: Ongoing (interventions implemented as appropriate)   19   Plan of Care Review   Progress --  improving   OTHER   Outcome Summary --  progressing normally   Coping/Psychosocial   Plan of Care Reviewed With patient;spouse --      Goal: Individualization and Mutuality  Outcome: Ongoing (interventions implemented as appropriate)   19   Individualization   Patient Specific Preferences wants to breastfeed exclusively   Patient Specific Goals (Include Timeframe) hopes to have a routine recovery   Mutuality/Individual Preferences   What Anxieties, Fears, Concerns, or Questions Do You Have About Your Care? none   What Information Would Help Us Give You More Personalized Care? likes to have family around     Goal: Discharge Needs Assessment  Outcome: Ongoing (interventions implemented as appropriate)   19   Discharge Needs Assessment   Readmission Within the Last 30 Days no previous admission in last 30 days   Concerns to be Addressed no discharge needs identified   Patient/Family Anticipates Transition to home   Patient/Family Anticipated Services at Transition none   Transportation Concerns car, none   Transportation Anticipated family or friend will provide   Anticipated Changes Related to Illness none   Equipment Needed After Discharge none   Disability   Equipment Currently Used at Home none     Goal: Interprofessional Rounds/Family Conf  Outcome: Ongoing (interventions implemented as appropriate)   19   Interdisciplinary Rounds/Family Conf   Participants family;nursing;dietitian/nutrition services;patient;physician;advanced practice nurse       Problem: Postpartum ( Delivery) (Adult,Obstetrics,Pediatric)  Goal: Anesthesia/Sedation Recovery  Outcome: Ongoing (interventions implemented as appropriate)   19   Goal/Outcome Evaluation   Anesthesia/Sedation Recovery recovered to  baseline     Goal: Signs and Symptoms of Listed Potential Problems Will be Absent, Minimized or Managed (Postpartum)  Outcome: Ongoing (interventions implemented as appropriate)   19   Goal/Outcome Evaluation   Problems Assessed (Postpartum ) all   Problems Present (Postpartum ) none     Goal: Anesthesia/Sedation Recovery  Outcome: Outcome(s) achieved Date Met: 19   Goal/Outcome Evaluation   Anesthesia/Sedation Recovery recovered to baseline

## 2019-02-14 VITALS
OXYGEN SATURATION: 97 % | DIASTOLIC BLOOD PRESSURE: 85 MMHG | RESPIRATION RATE: 16 BRPM | HEART RATE: 88 BPM | TEMPERATURE: 98.2 F | SYSTOLIC BLOOD PRESSURE: 132 MMHG

## 2019-02-14 PROCEDURE — 99024 POSTOP FOLLOW-UP VISIT: CPT | Performed by: NURSE PRACTITIONER

## 2019-02-14 RX ORDER — DOCUSATE SODIUM 100 MG/1
100 CAPSULE, LIQUID FILLED ORAL 2 TIMES DAILY
Qty: 60 CAPSULE | Refills: 1 | Status: SHIPPED | OUTPATIENT
Start: 2019-02-14 | End: 2021-04-13

## 2019-02-14 RX ORDER — IBUPROFEN 600 MG/1
600 TABLET ORAL EVERY 6 HOURS SCHEDULED
Status: DISCONTINUED | OUTPATIENT
Start: 2019-02-14 | End: 2019-02-14 | Stop reason: HOSPADM

## 2019-02-14 RX ORDER — FERROUS SULFATE 325(65) MG
325 TABLET ORAL
Qty: 60 TABLET | Refills: 1 | Status: SHIPPED | OUTPATIENT
Start: 2019-02-14 | End: 2021-04-13

## 2019-02-14 RX ORDER — IBUPROFEN 800 MG/1
800 TABLET ORAL EVERY 8 HOURS PRN
Qty: 30 TABLET | Refills: 0 | Status: SHIPPED | OUTPATIENT
Start: 2019-02-14

## 2019-02-14 RX ORDER — ACETAMINOPHEN 500 MG
TABLET ORAL
Qty: 120 TABLET | Refills: 0 | Status: SHIPPED | OUTPATIENT
Start: 2019-02-14

## 2019-02-14 RX ORDER — OXYCODONE HYDROCHLORIDE 5 MG/1
5 TABLET ORAL EVERY 4 HOURS PRN
Qty: 15 TABLET | Refills: 0 | Status: SHIPPED | OUTPATIENT
Start: 2019-02-14 | End: 2021-04-13

## 2019-02-14 RX ADMIN — IBUPROFEN 600 MG: 600 TABLET, FILM COATED ORAL at 15:28

## 2019-02-14 RX ADMIN — OXYCODONE HYDROCHLORIDE AND ACETAMINOPHEN 1 TABLET: 7.5; 325 TABLET ORAL at 08:43

## 2019-02-14 RX ADMIN — MEASLES, MUMPS, AND RUBELLA VIRUS VACCINE LIVE 0.5 ML: 1000; 12500; 1000 INJECTION, POWDER, LYOPHILIZED, FOR SUSPENSION SUBCUTANEOUS at 04:28

## 2019-02-14 RX ADMIN — IBUPROFEN 600 MG: 600 TABLET, FILM COATED ORAL at 08:44

## 2019-02-14 RX ADMIN — DOCUSATE SODIUM 100 MG: 100 CAPSULE, LIQUID FILLED ORAL at 08:44

## 2019-02-14 RX ADMIN — OXYCODONE HYDROCHLORIDE AND ACETAMINOPHEN 1 TABLET: 7.5; 325 TABLET ORAL at 15:29

## 2019-02-14 RX ADMIN — FERROUS SULFATE TAB EC 324 MG (65 MG FE EQUIVALENT) 324 MG: 324 (65 FE) TABLET DELAYED RESPONSE at 08:43

## 2019-02-14 RX ADMIN — IBUPROFEN 600 MG: 600 TABLET, FILM COATED ORAL at 02:05

## 2019-02-14 NOTE — DISCHARGE SUMMARY
Discharge Summary     Sudheer Boucher  : 1995  MRN: [id number]  CSN: [id number]    Date of Admission: 2019   Date of Discharge:  2019   Delivering Physician: Luis Miguel Martel MD       Admission Diagnosis: 1. Previous  section [Z98.891]   Discharge Diagnosis: 1. Same as above plus  2. Pregnancy at 37w4d - delivered       Procedures: 1. Repeat  (LTCS) & BTL     Hospital Course  See the completed operative report for details regarding antepartum course and delivery.    Her post-operative course was unremarkable.  On POD # 3 she felt like she ready ready for D/C and requested to go home today.  She denies problems and is tolerating regular diet, ambulating & voiding without difficulty, + flatus.  States lochia is light.  Her wound was healing well & without obvious signs of infections.    Infant  male  fetus weighing 3374 g (7 lb 7 oz)   Apgars -  8  @ 1 minute /  9  @ 5 minutes.    Discharge labs  Lab Results   Component Value Date    WBC 16.40 (H) 2019    HGB 7.3 (C) 2019    HCT 25.3 (L) 2019     (L) 2019       Discharge Medications     Discharge Medications      New Medications      Instructions Start Date   acetaminophen 500 MG tablet  Commonly known as:  TYLENOL   Take 2 tablet q 8 hrs      ibuprofen 800 MG tablet  Commonly known as:  ADVIL,MOTRIN   800 mg, Oral, Every 8 Hours PRN      oxyCODONE 5 MG immediate release tablet  Commonly known as:  ROXICODONE   5 mg, Oral, Every 4 Hours PRN         Continue These Medications      Instructions Start Date   benzonatate 100 MG capsule  Commonly known as:  TESSALON PERLES   100 mg, Oral, 3 Times Daily PRN      docusate sodium 100 MG capsule  Commonly known as:  COLACE   100 mg, Oral, 2 Times Daily      ferrous sulfate 325 (65 FE) MG tablet   325 mg, Oral, 2 Times Daily Before Meals      freestyle lancets   Use as directed by your physician      ONE TOUCH ULTRA 2 w/Device  kit   USE TO CHECK BLOOD GLUCOSE ONCE DAILY OR AS DIRECTED      Prenatal Vitamins 28-0.8 MG tablet   1 tablet, Oral, Daily      psyllium 28 % packet  Commonly known as:  METAMUCIL SMOOTH TEXTURE   1 packet, Oral, Daily      vitamin B-6 25 MG tablet  Commonly known as:  PYRIDOXINE   25 mg, Oral, Every Night at Bedtime         Stop These Medications    doxylamine 25 MG tablet  Commonly known as:  UNISOM     glucose blood test strip  Commonly known as:  IGLUCOSE TEST STRIPS     glyBURIDE 2.5 MG tablet  Commonly known as:  DIABETA     ondansetron 4 MG tablet  Commonly known as:  ZOFRAN            Discharge Disposition Home or Self Care   Condition on Discharge: good   Follow-up: 1 week    F/U at [facility] office     Kathrin Lopez CNM  2/14/2019

## 2019-02-14 NOTE — PLAN OF CARE
Problem: Patient Care Overview  Goal: Plan of Care Review  Outcome: Ongoing (interventions implemented as appropriate)   19 0506   Plan of Care Review   Progress improving   OTHER   Outcome Summary Difficult to control patient's pain. VSS. Breastfeeding well. Milk came in tonight. Plans for discharge this am. Requested breastpump because pt states that she only had hand-held one at home.    Coping/Psychosocial   Plan of Care Reviewed With patient       Problem: Postpartum ( Delivery) (Adult,Obstetrics,Pediatric)  Goal: Signs and Symptoms of Listed Potential Problems Will be Absent, Minimized or Managed (Postpartum)  Outcome: Ongoing (interventions implemented as appropriate)   19 0506   Goal/Outcome Evaluation   Problems Assessed (Postpartum ) all   Problems Present (Postpartum ) none

## 2019-02-14 NOTE — CONSULTS
Continued Stay Note  [facility]     Patient Name: Cortney Boucher  MRN: [id number]  Today's Date: 2/14/2019    Admit Date: 2/11/2019    Discharge Plan     Row Name 02/14/19 0929       Plan    Plan  Social Service consult due to [place] score of 11      Plan Comments  SW spoke with STEFANIA Rivas regarding consult. Evelyn has no concens with mother. Evelyn has talked to mother and mother does not express current concerns or s/s of depression or PPD. Evelyn will discuss PPD s/s with mother and provide some information for mother to take home with her. No other concerns at this time. STEFANIA Rivas in agreement with plan. ANNE may be consulted should other needs arise.         Discharge Codes    No documentation.       Expected Discharge Date and Time     Expected Discharge Date Expected Discharge Time    Feb 14, 2019             VANESSA Monte  9:32 AM  02/14/19

## 2019-02-18 ENCOUNTER — OFFICE VISIT (OUTPATIENT)
Dept: OBSTETRICS AND GYNECOLOGY | Facility: CLINIC | Age: 24
End: 2019-02-18

## 2019-02-18 VITALS
SYSTOLIC BLOOD PRESSURE: 102 MMHG | HEIGHT: 59 IN | WEIGHT: 106.2 LBS | DIASTOLIC BLOOD PRESSURE: 70 MMHG | BODY MASS INDEX: 21.41 KG/M2

## 2019-02-18 DIAGNOSIS — Z09 POSTOP CHECK: Primary | ICD-10-CM

## 2019-02-18 PROCEDURE — 99024 POSTOP FOLLOW-UP VISIT: CPT | Performed by: PHYSICIAN ASSISTANT

## 2019-02-18 NOTE — PATIENT INSTRUCTIONS
RTO 5 weeks or prn  Drink plenty of water  Call if diarrhea worsens or not resolved in 24-48 hours

## 2019-02-18 NOTE — PROGRESS NOTES
Problem: Patient Care Overview (Adult)  Goal: Discharge Needs Assessment  Outcome: Outcome(s) achieved Date Met:  01/09/17 01/09/17 1141   Discharge Needs Assessment   Concerns To Be Addressed no discharge needs identified   Equipment Needed After Discharge none   Current Health   Anticipated Changes Related to Illness none   Living Environment   Transportation Available car            Subjective   Chief Complaint   Patient presents with   • Post-op     One week post-op C/S and tubal; staples removed and steri-strips applied; doing well       Cortney Catia Boucher is a 23 y.o. year old  presenting to be seen for post op visit  She is one week post op  section and tubal  She has done well post op with normal bowel and bladder function. She has had some diarrhea twice daily yesterday and once today  Vaginal bleeding light  She is breastfeeding without problems  Has no complaints today    Past Medical History:   Diagnosis Date   • Abnormal Pap smear of cervix 2015    ASCUS +HPV   • Anxiety and depression    • Body piercing     LOWER LIP, TOP OF LEFT EAR   • Constipation    • Depression    • GERD (gastroesophageal reflux disease)    • Hearing loss     REPORTS USES NO HEARING DEVICES   • History of blood transfusion 2016    REPORTS NO REACTION   • History of Papanicolaou smear of cervix 2016    NORMAL    • HPV in female    • Hypoglycemia    • Migraine    • Ovarian cyst 2017    RIGHT    • Rubella non-immune status, antepartum 2018   • Tattoos     1 ON LEFT WRIST, BILATERAL SHOULDER BLADES, LEFT LEG        Current Outpatient Medications:   •  Blood Glucose Monitoring Suppl (ONE TOUCH ULTRA 2) w/Device kit, USE TO CHECK BLOOD GLUCOSE ONCE DAILY OR AS DIRECTED, Disp: , Rfl: 0  •  docusate sodium (COLACE) 100 MG capsule, Take 1 capsule by mouth 2 (Two) Times a Day., Disp: 60 capsule, Rfl: 1  •  ferrous sulfate 325 (65 FE) MG tablet, Take 1 tablet by mouth 2 (Two) Times a Day Before Meals., Disp: 60 tablet, Rfl: 1  •  ibuprofen (ADVIL,MOTRIN) 800 MG tablet, Take 1 tablet by mouth Every 8 (Eight) Hours As Needed for Mild Pain  for up to 30 doses., Disp: 30 tablet, Rfl: 0  •  Lancets (FREESTYLE) lancets, Use as directed by your physician, Disp: 100 each, Rfl: 12  •  oxyCODONE (ROXICODONE) 5 MG immediate release tablet, Take 1 tablet by mouth Every 4 (Four) Hours As  Needed for Moderate Pain, Disp: 15 tablet, Rfl: 0  •  acetaminophen (TYLENOL) 500 MG tablet, Take 2 tablet q 8 hrs, Disp: 120 tablet, Rfl: 0  •  benzonatate (TESSALON PERLES) 100 MG capsule, Take 1 capsule by mouth 3 (Three) Times a Day As Needed for Cough., Disp: 60 capsule, Rfl: 0  •  Prenatal Vit-Fe Fumarate-FA (PRENATAL VITAMINS) 28-0.8 MG tablet, Take 1 tablet by mouth Daily., Disp: 90 tablet, Rfl: 3  •  psyllium (METAMUCIL SMOOTH TEXTURE) 28 % packet, Take 1 packet by mouth Daily., Disp: 30 each, Rfl: 11  •  vitamin B-6 (PYRIDOXINE) 25 MG tablet, Take 1 tablet by mouth every night at bedtime., Disp: 30 tablet, Rfl: 5   Allergies   Allergen Reactions   • Latex Hives      Past Surgical History:   Procedure Laterality Date   •  SECTION  2016    DR MARC SALINAS   •  SECTION WITH TUBAL N/A 2019    Procedure:  SECTION REPEAT WITH TUBAL;  Surgeon: Luis Miguel Martel MD;  Location: [facility] LABOR DELIVERY;  Service: Obstetrics/Gynecology   • EAR TUBES        Social History     Socioeconomic History   • Marital status:      Spouse name: Not on file   • Number of children: Not on file   • Years of education: Not on file   • Highest education level: Not on file   Social Needs   • Financial resource strain: Not on file   • Food insecurity - worry: Not on file   • Food insecurity - inability: Not on file   • Transportation needs - medical: Not on file   • Transportation needs - non-medical: Not on file   Occupational History   • Not on file   Tobacco Use   • Smoking status: Never Smoker   • Smokeless tobacco: Never Used   Substance and Sexual Activity   • Alcohol use: No   • Drug use: No   • Sexual activity: Yes     Partners: Male   Other Topics Concern   • Not on file   Social History Narrative   • Not on file      Family History   Problem Relation Age of Onset   • Cancer Other    • Diabetes Other    • Hypertension Other    • Hyperlipidemia Other    • Liver disease Other          "aunt has liver disease from hep c   • Mental illness Other    • Thyroid disease Other    • Ulcers Mother        Review of Systems        Objective   /70   Ht 149.9 cm (59\")   Wt 48.2 kg (106 lb 3.2 oz)   LMP 05/28/2018   Breastfeeding? Yes   BMI 21.45 kg/m²     Physical Exam   Constitutional: She appears well-developed and well-nourished. She is cooperative. No distress.   Abdominal: Soft. Normal appearance. There is no tenderness. There is no rigidity and no guarding.   Incision healing well   Neurological: She is alert.   Skin: Skin is warm and dry.   Psychiatric: She has a normal mood and affect. Her behavior is normal.            Assessment and Plan  Cortney was seen today for post-op.    Diagnoses and all orders for this visit:    Postop check      Patient Instructions   RTO 5 weeks or prn  Drink plenty of water  Call if diarrhea worsens or not resolved in 24-48 hours             This note was electronically signed.    Kiya Cowart PA-C   February 18, 2019  "

## 2019-04-04 DIAGNOSIS — D50.9 IRON DEFICIENCY ANEMIA, UNSPECIFIED IRON DEFICIENCY ANEMIA TYPE: ICD-10-CM

## 2019-04-04 RX ORDER — DOCUSATE SODIUM 100 MG/1
CAPSULE, LIQUID FILLED ORAL
Qty: 60 CAPSULE | Refills: 0 | OUTPATIENT
Start: 2019-04-04

## 2019-04-04 RX ORDER — FERROUS SULFATE 325(65) MG
TABLET ORAL
Qty: 60 TABLET | Refills: 0 | OUTPATIENT
Start: 2019-04-04

## 2019-05-05 ENCOUNTER — HOSPITAL ENCOUNTER (EMERGENCY)
Facility: HOSPITAL | Age: 24
Discharge: HOME | End: 2019-05-05
Attending: EMERGENCY MEDICINE | Admitting: EMERGENCY MEDICINE

## 2019-05-05 VITALS
BODY MASS INDEX: 19.84 KG/M2 | RESPIRATION RATE: 16 BRPM | HEART RATE: 89 BPM | HEIGHT: 59 IN | WEIGHT: 98.4 LBS | SYSTOLIC BLOOD PRESSURE: 118 MMHG | DIASTOLIC BLOOD PRESSURE: 86 MMHG | OXYGEN SATURATION: 99 % | TEMPERATURE: 97.4 F

## 2019-05-05 DIAGNOSIS — N93.9 VAGINAL BLEEDING: Primary | ICD-10-CM

## 2019-05-05 LAB
ALBUMIN SERPL-MCNC: 4.5 G/DL (ref 3.5–5)
ALBUMIN/GLOB SERPL: 1.3 G/DL (ref 1–2)
ALP SERPL-CCNC: 70 U/L (ref 38–126)
ALT SERPL W P-5'-P-CCNC: 18 U/L (ref 13–69)
ANION GAP SERPL CALCULATED.3IONS-SCNC: 17.7 MMOL/L (ref 10–20)
AST SERPL-CCNC: 23 U/L (ref 15–46)
B-HCG UR QL: NEGATIVE
BASOPHILS # BLD AUTO: 0 10*3/MM3 (ref 0–0.2)
BASOPHILS NFR BLD AUTO: 0 % (ref 0–1.5)
BILIRUB SERPL-MCNC: 0.2 MG/DL (ref 0.2–1.3)
BUN BLD-MCNC: 8 MG/DL (ref 7–20)
BUN/CREAT SERPL: 10 (ref 7.1–23.5)
CALCIUM SPEC-SCNC: 9.1 MG/DL (ref 8.4–10.2)
CHLORIDE SERPL-SCNC: 104 MMOL/L (ref 98–107)
CO2 SERPL-SCNC: 22 MMOL/L (ref 26–30)
CREAT BLD-MCNC: 0.8 MG/DL (ref 0.6–1.3)
D-LACTATE SERPL-SCNC: 1.2 MMOL/L (ref 0.5–2)
DEPRECATED RDW RBC AUTO: 39.5 FL (ref 37–54)
EOSINOPHIL # BLD AUTO: 0.1 10*3/MM3 (ref 0–0.4)
EOSINOPHIL NFR BLD AUTO: 1.5 % (ref 0.3–6.2)
ERYTHROCYTE [DISTWIDTH] IN BLOOD BY AUTOMATED COUNT: 14.3 % (ref 12.3–15.4)
GFR SERPL CREATININE-BSD FRML MDRD: 88 ML/MIN/1.73
GLOBULIN UR ELPH-MCNC: 3.6 GM/DL
GLUCOSE BLD-MCNC: 98 MG/DL (ref 74–98)
HCT VFR BLD AUTO: 37.1 % (ref 34–46.6)
HEMOCCULT STL QL: NEGATIVE
HGB BLD-MCNC: 11.8 G/DL (ref 12–15.9)
IMM GRANULOCYTES # BLD AUTO: 0.01 10*3/MM3 (ref 0–0.05)
IMM GRANULOCYTES NFR BLD AUTO: 0.2 % (ref 0–0.5)
LIPASE SERPL-CCNC: 133 U/L (ref 23–300)
LYMPHOCYTES # BLD AUTO: 1.46 10*3/MM3 (ref 0.7–3.1)
LYMPHOCYTES NFR BLD AUTO: 22.1 % (ref 19.6–45.3)
MCH RBC QN AUTO: 24.6 PG (ref 26.6–33)
MCHC RBC AUTO-ENTMCNC: 31.8 G/DL (ref 31.5–35.7)
MCV RBC AUTO: 77.5 FL (ref 79–97)
MONOCYTES # BLD AUTO: 0.37 10*3/MM3 (ref 0.1–0.9)
MONOCYTES NFR BLD AUTO: 5.6 % (ref 5–12)
NEUTROPHILS # BLD AUTO: 4.66 10*3/MM3 (ref 1.7–7)
NEUTROPHILS NFR BLD AUTO: 70.6 % (ref 42.7–76)
NRBC BLD AUTO-RTO: 0 /100 WBC (ref 0–0.2)
PLATELET # BLD AUTO: 191 10*3/MM3 (ref 140–450)
PMV BLD AUTO: 10.7 FL (ref 6–12)
POTASSIUM BLD-SCNC: 3.7 MMOL/L (ref 3.5–5.1)
PROT SERPL-MCNC: 8.1 G/DL (ref 6.3–8.2)
RBC # BLD AUTO: 4.79 10*6/MM3 (ref 3.77–5.28)
SODIUM BLD-SCNC: 140 MMOL/L (ref 137–145)
WBC NRBC COR # BLD: 6.6 10*3/MM3 (ref 3.4–10.8)

## 2019-05-05 PROCEDURE — 80053 COMPREHEN METABOLIC PANEL: CPT | Performed by: EMERGENCY MEDICINE

## 2019-05-05 PROCEDURE — 85025 COMPLETE CBC W/AUTO DIFF WBC: CPT | Performed by: EMERGENCY MEDICINE

## 2019-05-05 PROCEDURE — 81025 URINE PREGNANCY TEST: CPT | Performed by: EMERGENCY MEDICINE

## 2019-05-05 PROCEDURE — 83690 ASSAY OF LIPASE: CPT | Performed by: EMERGENCY MEDICINE

## 2019-05-05 PROCEDURE — 99283 EMERGENCY DEPT VISIT LOW MDM: CPT

## 2019-05-05 PROCEDURE — 82272 OCCULT BLD FECES 1-3 TESTS: CPT | Performed by: EMERGENCY MEDICINE

## 2019-05-05 PROCEDURE — 83605 ASSAY OF LACTIC ACID: CPT | Performed by: EMERGENCY MEDICINE

## 2019-05-06 NOTE — ED PROVIDER NOTES
Subjective   23-year-old female presenting with concern about rectal bleeding.  She states that for the last 3 days she has had dark red blood when she wipes after having bowel movement.  She did discharge her.  But is convinced that this is not the source of her bleeding.  She just delivered a baby 3 months ago via .  She denies any fevers, chills, nausea, vomiting, abdominal pain, diarrhea or constipation.  No rectal pain.            Review of Systems   Constitutional: Negative.    HENT: Negative.    Eyes: Negative.    Respiratory: Negative.    Cardiovascular: Negative.    Gastrointestinal: Positive for blood in stool. Negative for abdominal pain, diarrhea, nausea, rectal pain and vomiting.   Genitourinary: Positive for vaginal bleeding.   Musculoskeletal: Negative.    Skin: Negative.    Neurological: Negative.    Psychiatric/Behavioral: Negative.        Past Medical History:   Diagnosis Date   • Abnormal Pap smear of cervix 2015    ASCUS +HPV   • Anxiety and depression    • Body piercing     LOWER LIP, TOP OF LEFT EAR   • Constipation    • Depression    • GERD (gastroesophageal reflux disease)    • Hearing loss     REPORTS USES NO HEARING DEVICES   • History of blood transfusion 2016    REPORTS NO REACTION   • History of Papanicolaou smear of cervix 2016    NORMAL    • HPV in female    • Hypoglycemia    • Migraine    • Ovarian cyst 2017    RIGHT    • Rubella non-immune status, antepartum 2018   • Tattoos     1 ON LEFT WRIST, BILATERAL SHOULDER BLADES, LEFT LEG       Allergies   Allergen Reactions   • Latex Hives       Past Surgical History:   Procedure Laterality Date   •  SECTION  2016    DR MARC SALINAS   •  SECTION WITH TUBAL N/A 2019    Procedure:  SECTION REPEAT WITH TUBAL;  Surgeon: Luis Miguel Martel MD;  Location: [facility] LABOR DELIVERY;  Service: Obstetrics/Gynecology   • EAR TUBES         Family History   Problem Relation Age of Onset   •  Cancer Other    • Diabetes Other    • Hypertension Other    • Hyperlipidemia Other    • Liver disease Other         aunt has liver disease from hep c   • Mental illness Other    • Thyroid disease Other    • Ulcers Mother        Social History     Socioeconomic History   • Marital status:      Spouse name: Not on file   • Number of children: Not on file   • Years of education: Not on file   • Highest education level: Not on file   Tobacco Use   • Smoking status: Never Smoker   • Smokeless tobacco: Never Used   Substance and Sexual Activity   • Alcohol use: No   • Drug use: No   • Sexual activity: Yes     Partners: Male           Objective   Physical Exam   Constitutional: She is oriented to person, place, and time. She appears well-developed and well-nourished. No distress.   HENT:   Head: Normocephalic and atraumatic.   Right Ear: External ear normal.   Left Ear: External ear normal.   Nose: Nose normal.   Mouth/Throat: Oropharynx is clear and moist.   Eyes: Conjunctivae and EOM are normal. Pupils are equal, round, and reactive to light.   Neck: Normal range of motion. Neck supple.   Cardiovascular: Normal rate, regular rhythm, normal heart sounds and intact distal pulses.   Pulmonary/Chest: Effort normal and breath sounds normal. No respiratory distress.   Abdominal: Soft. Bowel sounds are normal. She exhibits no distension. There is no tenderness. There is no rebound and no guarding.   Genitourinary:   Genitourinary Comments: Rectum is normal, no bleeding from rectum, soft brown stool, bleeding from vagina   Musculoskeletal: Normal range of motion. She exhibits no edema, tenderness or deformity.   Neurological: She is alert and oriented to person, place, and time.   Skin: Skin is warm and dry. No rash noted.   Psychiatric: She has a normal mood and affect. Her behavior is normal.   Nursing note and vitals reviewed.      Procedures           ED Course                  MDM  Number of Diagnoses or Management  Options  Vaginal bleeding:   Diagnosis management comments: 23-year-old female with concern about rectal bleeding.  Well-developed, well-nourished female no distress with normal vital signs and exam as above.  Her abdominal exam is benign.  Based on her rectal exam I suspect the bleeding she has noticed is actually from her vagina.  She states she is concerned that she has been anemic before so we will check labs and occult blood.  Disposition pending.    DDX: Vaginal bleeding, rectal bleeding, anemia, hemorrhoids    Lab work is largely unremarkable.  Fecal occult blood is negative.  Reassured her that this is likely vaginal bleeding that she was noticing.  Will discharge home with outpatient follow-up.  She is happy with the plan.       Amount and/or Complexity of Data Reviewed  Clinical lab tests: reviewed          Final diagnoses:   Vaginal bleeding            Phill Abdi MD  05/05/19 1917

## 2019-11-03 ENCOUNTER — HOSPITAL ENCOUNTER (EMERGENCY)
Facility: HOSPITAL | Age: 24
Discharge: HOME | End: 2019-11-04
Attending: STUDENT IN AN ORGANIZED HEALTH CARE EDUCATION/TRAINING PROGRAM | Admitting: STUDENT IN AN ORGANIZED HEALTH CARE EDUCATION/TRAINING PROGRAM

## 2019-11-03 ENCOUNTER — APPOINTMENT (OUTPATIENT)
Dept: CT IMAGING | Facility: HOSPITAL | Age: 24
End: 2019-11-03

## 2019-11-03 DIAGNOSIS — R10.13 EPIGASTRIC PAIN: Primary | ICD-10-CM

## 2019-11-03 LAB
ALBUMIN SERPL-MCNC: 4.5 G/DL (ref 3.5–5.2)
ALBUMIN/GLOB SERPL: 1.4 G/DL
ALP SERPL-CCNC: 66 U/L (ref 39–117)
ALT SERPL W P-5'-P-CCNC: 9 U/L (ref 1–33)
ANION GAP SERPL CALCULATED.3IONS-SCNC: 11.3 MMOL/L (ref 5–15)
AST SERPL-CCNC: 18 U/L (ref 1–32)
B-HCG UR QL: NEGATIVE
BASOPHILS # BLD AUTO: 0.01 10*3/MM3 (ref 0–0.2)
BASOPHILS NFR BLD AUTO: 0.2 % (ref 0–1.5)
BILIRUB SERPL-MCNC: 0.3 MG/DL (ref 0.2–1.2)
BILIRUB UR QL STRIP: NEGATIVE
BUN BLD-MCNC: 6 MG/DL (ref 6–20)
BUN/CREAT SERPL: 7.6 (ref 7–25)
CALCIUM SPEC-SCNC: 9.4 MG/DL (ref 8.6–10.5)
CHLORIDE SERPL-SCNC: 102 MMOL/L (ref 98–107)
CLARITY UR: CLEAR
CO2 SERPL-SCNC: 25.7 MMOL/L (ref 22–29)
COLOR UR: YELLOW
CREAT BLD-MCNC: 0.79 MG/DL (ref 0.57–1)
DEPRECATED RDW RBC AUTO: 40 FL (ref 37–54)
EOSINOPHIL # BLD AUTO: 0.1 10*3/MM3 (ref 0–0.4)
EOSINOPHIL NFR BLD AUTO: 1.6 % (ref 0.3–6.2)
ERYTHROCYTE [DISTWIDTH] IN BLOOD BY AUTOMATED COUNT: 14.5 % (ref 12.3–15.4)
GFR SERPL CREATININE-BSD FRML MDRD: 89 ML/MIN/1.73
GLOBULIN UR ELPH-MCNC: 3.3 GM/DL
GLUCOSE BLD-MCNC: 101 MG/DL (ref 65–99)
GLUCOSE UR STRIP-MCNC: NEGATIVE MG/DL
HCT VFR BLD AUTO: 38.5 % (ref 34–46.6)
HGB BLD-MCNC: 12 G/DL (ref 12–15.9)
HGB UR QL STRIP.AUTO: NEGATIVE
IMM GRANULOCYTES # BLD AUTO: 0.02 10*3/MM3 (ref 0–0.05)
IMM GRANULOCYTES NFR BLD AUTO: 0.3 % (ref 0–0.5)
KETONES UR QL STRIP: NEGATIVE
LEUKOCYTE ESTERASE UR QL STRIP.AUTO: NEGATIVE
LIPASE SERPL-CCNC: 25 U/L (ref 13–60)
LYMPHOCYTES # BLD AUTO: 1.31 10*3/MM3 (ref 0.7–3.1)
LYMPHOCYTES NFR BLD AUTO: 20.9 % (ref 19.6–45.3)
MCH RBC QN AUTO: 24 PG (ref 26.6–33)
MCHC RBC AUTO-ENTMCNC: 31.2 G/DL (ref 31.5–35.7)
MCV RBC AUTO: 77 FL (ref 79–97)
MONOCYTES # BLD AUTO: 0.44 10*3/MM3 (ref 0.1–0.9)
MONOCYTES NFR BLD AUTO: 7 % (ref 5–12)
NEUTROPHILS # BLD AUTO: 4.4 10*3/MM3 (ref 1.7–7)
NEUTROPHILS NFR BLD AUTO: 70 % (ref 42.7–76)
NITRITE UR QL STRIP: NEGATIVE
NRBC BLD AUTO-RTO: 0 /100 WBC (ref 0–0.2)
PH UR STRIP.AUTO: 7 [PH] (ref 5–8)
PLATELET # BLD AUTO: 215 10*3/MM3 (ref 140–450)
PMV BLD AUTO: 11.3 FL (ref 6–12)
POTASSIUM BLD-SCNC: 4 MMOL/L (ref 3.5–5.2)
PROT SERPL-MCNC: 7.8 G/DL (ref 6–8.5)
PROT UR QL STRIP: NEGATIVE
RBC # BLD AUTO: 5 10*6/MM3 (ref 3.77–5.28)
SODIUM BLD-SCNC: 139 MMOL/L (ref 136–145)
SP GR UR STRIP: 1.01 (ref 1–1.03)
UROBILINOGEN UR QL STRIP: NORMAL
WBC NRBC COR # BLD: 6.28 10*3/MM3 (ref 3.4–10.8)

## 2019-11-03 PROCEDURE — 81003 URINALYSIS AUTO W/O SCOPE: CPT | Performed by: STUDENT IN AN ORGANIZED HEALTH CARE EDUCATION/TRAINING PROGRAM

## 2019-11-03 PROCEDURE — 96374 THER/PROPH/DIAG INJ IV PUSH: CPT

## 2019-11-03 PROCEDURE — 85025 COMPLETE CBC W/AUTO DIFF WBC: CPT | Performed by: STUDENT IN AN ORGANIZED HEALTH CARE EDUCATION/TRAINING PROGRAM

## 2019-11-03 PROCEDURE — 25010000002 IOPAMIDOL 61 % SOLUTION: Performed by: STUDENT IN AN ORGANIZED HEALTH CARE EDUCATION/TRAINING PROGRAM

## 2019-11-03 PROCEDURE — 25010000002 KETOROLAC TROMETHAMINE PER 15 MG: Performed by: STUDENT IN AN ORGANIZED HEALTH CARE EDUCATION/TRAINING PROGRAM

## 2019-11-03 PROCEDURE — 81025 URINE PREGNANCY TEST: CPT | Performed by: STUDENT IN AN ORGANIZED HEALTH CARE EDUCATION/TRAINING PROGRAM

## 2019-11-03 PROCEDURE — 96375 TX/PRO/DX INJ NEW DRUG ADDON: CPT

## 2019-11-03 PROCEDURE — 83690 ASSAY OF LIPASE: CPT | Performed by: STUDENT IN AN ORGANIZED HEALTH CARE EDUCATION/TRAINING PROGRAM

## 2019-11-03 PROCEDURE — 25010000002 ONDANSETRON PER 1 MG: Performed by: STUDENT IN AN ORGANIZED HEALTH CARE EDUCATION/TRAINING PROGRAM

## 2019-11-03 PROCEDURE — 80053 COMPREHEN METABOLIC PANEL: CPT | Performed by: STUDENT IN AN ORGANIZED HEALTH CARE EDUCATION/TRAINING PROGRAM

## 2019-11-03 PROCEDURE — 74177 CT ABD & PELVIS W/CONTRAST: CPT

## 2019-11-03 PROCEDURE — 99283 EMERGENCY DEPT VISIT LOW MDM: CPT

## 2019-11-03 PROCEDURE — 25010000002 MORPHINE PER 10 MG: Performed by: STUDENT IN AN ORGANIZED HEALTH CARE EDUCATION/TRAINING PROGRAM

## 2019-11-03 RX ORDER — KETOROLAC TROMETHAMINE 30 MG/ML
30 INJECTION, SOLUTION INTRAMUSCULAR; INTRAVENOUS ONCE
Status: COMPLETED | OUTPATIENT
Start: 2019-11-03 | End: 2019-11-03

## 2019-11-03 RX ORDER — MORPHINE SULFATE 4 MG/ML
4 INJECTION, SOLUTION INTRAMUSCULAR; INTRAVENOUS ONCE
Status: COMPLETED | OUTPATIENT
Start: 2019-11-03 | End: 2019-11-03

## 2019-11-03 RX ORDER — ONDANSETRON 2 MG/ML
4 INJECTION INTRAMUSCULAR; INTRAVENOUS ONCE
Status: COMPLETED | OUTPATIENT
Start: 2019-11-03 | End: 2019-11-03

## 2019-11-03 RX ADMIN — KETOROLAC TROMETHAMINE 30 MG: 30 INJECTION, SOLUTION INTRAMUSCULAR at 20:43

## 2019-11-03 RX ADMIN — SODIUM CHLORIDE 1000 ML: 9 INJECTION, SOLUTION INTRAVENOUS at 20:42

## 2019-11-03 RX ADMIN — ONDANSETRON 4 MG: 2 INJECTION INTRAMUSCULAR; INTRAVENOUS at 20:42

## 2019-11-03 RX ADMIN — IOPAMIDOL 60 ML: 612 INJECTION, SOLUTION INTRAVENOUS at 23:10

## 2019-11-03 RX ADMIN — MORPHINE SULFATE 4 MG: 4 INJECTION INTRAVENOUS at 20:44

## 2019-11-04 VITALS
HEIGHT: 59 IN | SYSTOLIC BLOOD PRESSURE: 108 MMHG | RESPIRATION RATE: 20 BRPM | DIASTOLIC BLOOD PRESSURE: 80 MMHG | OXYGEN SATURATION: 99 % | HEART RATE: 90 BPM | BODY MASS INDEX: 19.23 KG/M2 | TEMPERATURE: 97.5 F | WEIGHT: 95.4 LBS

## 2019-11-04 RX ORDER — METOCLOPRAMIDE HYDROCHLORIDE 5 MG/ML
10 INJECTION INTRAMUSCULAR; INTRAVENOUS ONCE
Status: DISCONTINUED | OUTPATIENT
Start: 2019-11-04 | End: 2019-11-04

## 2019-11-04 RX ORDER — DICYCLOMINE HYDROCHLORIDE 10 MG/1
40 CAPSULE ORAL ONCE
Status: COMPLETED | OUTPATIENT
Start: 2019-11-04 | End: 2019-11-04

## 2019-11-04 RX ORDER — DICYCLOMINE HCL 20 MG
20 TABLET ORAL EVERY 6 HOURS PRN
Qty: 20 TABLET | Refills: 0 | Status: SHIPPED | OUTPATIENT
Start: 2019-11-04 | End: 2021-04-13

## 2019-11-04 RX ORDER — DIPHENHYDRAMINE HYDROCHLORIDE 50 MG/ML
25 INJECTION INTRAMUSCULAR; INTRAVENOUS ONCE
Status: DISCONTINUED | OUTPATIENT
Start: 2019-11-04 | End: 2019-11-04

## 2019-11-04 RX ADMIN — DICYCLOMINE HYDROCHLORIDE 40 MG: 10 CAPSULE ORAL at 00:40

## 2019-11-04 NOTE — ED PROVIDER NOTES
"Subjective   24-year-old female complains of upper abdominal pain that started last night on the right side and is moved to the left side today.  She states if she bends over the pain is worse or if she takes a deep breath or moves her abdomen.  She reports she does still have her gallbladder.  Patient denies heartburn or hiatal hernia.  Reports pain is severe, describes it as someone \"trying to rip my insides out\", and is worse with eating.  Patient denies fever, chills, sweats, chest pain or shortness of breath.  Patient does state that she have nausea but has not vomited.            Review of Systems   All other systems reviewed and are negative.      Past Medical History:   Diagnosis Date   • Abnormal Pap smear of cervix 2015    ASCUS +HPV   • Anxiety and depression    • Body piercing     LOWER LIP, TOP OF LEFT EAR   • Constipation    • Depression    • GERD (gastroesophageal reflux disease)    • Hearing loss     REPORTS USES NO HEARING DEVICES   • History of blood transfusion 2016    REPORTS NO REACTION   • History of Papanicolaou smear of cervix 2016    NORMAL    • HPV in female    • Hypoglycemia    • Migraine    • Ovarian cyst 2017    RIGHT    • Rubella non-immune status, antepartum 2018   • Tattoos     1 ON LEFT WRIST, BILATERAL SHOULDER BLADES, LEFT LEG       Allergies   Allergen Reactions   • Latex Hives       Past Surgical History:   Procedure Laterality Date   •  SECTION  2016    DR MARC SALINAS   •  SECTION WITH TUBAL N/A 2019    Procedure:  SECTION REPEAT WITH TUBAL;  Surgeon: Luis Miguel Martel MD;  Location: [facility] LABOR DELIVERY;  Service: Obstetrics/Gynecology   • EAR TUBES         Family History   Problem Relation Age of Onset   • Cancer Other    • Diabetes Other    • Hypertension Other    • Hyperlipidemia Other    • Liver disease Other         aunt has liver disease from hep c   • Mental illness Other    • Thyroid disease Other    • Ulcers " Mother        Social History     Socioeconomic History   • Marital status:      Spouse name: Not on file   • Number of children: Not on file   • Years of education: Not on file   • Highest education level: Not on file   Tobacco Use   • Smoking status: Never Smoker   • Smokeless tobacco: Never Used   Substance and Sexual Activity   • Alcohol use: No   • Drug use: No   • Sexual activity: Yes     Partners: Male           Objective   Physical Exam   Nursing note and vitals reviewed.    GEN: No acute distress  Head: Normocephalic, atraumatic  Eyes: Pupils equal round reactive to light  ENT: Posterior pharynx normal in appearance, oral mucosa is moist  Chest: Nontender to palpation  Cardiovascular: Regular rate  Lungs: Clear to auscultation bilaterally  Abdomen: Soft, tender to palpation in the right upper quadrant and epigastrium otherwise nontender, nondistended, no peritoneal signs  Extremities: No edema, normal appearance  Neuro: GCS 15  Psych: Mood and affect are appropriate    Procedures           ED Course                  MDM  Number of Diagnoses or Management Options  Epigastric pain:   Diagnosis management comments: CT scan shows a stomach that is mildly distended.  There are mildly dilated mostly air-filled small bowel loops throughout the abdomen without clear signs of obstruction.  Radiology stated to consider enteritis.  No gross bowel thickening.  Normal appendix.  Minimal pelvic free fluid without gross adnexal lesion.  Patient was treated with IV fluids, morphine, and Zofran with good symptom control.  Patient will be sent home with Bentyl and instructed to follow-up with primary care.       Amount and/or Complexity of Data Reviewed  Clinical lab tests: reviewed  Tests in the radiology section of CPT®: reviewed  Decide to obtain previous medical records or to obtain history from someone other than the patient: yes  Obtain history from someone other than the patient: yes  Review and summarize past  medical records: yes  Independent visualization of images, tracings, or specimens: yes        Final diagnoses:   Epigastric pain              Khari Huggins MD  11/04/19 0111

## 2020-01-04 ENCOUNTER — HOSPITAL ENCOUNTER (EMERGENCY)
Facility: HOSPITAL | Age: 25
Discharge: HOME | End: 2020-01-05
Attending: EMERGENCY MEDICINE | Admitting: EMERGENCY MEDICINE

## 2020-01-04 DIAGNOSIS — Z71.1 FEARED COMPLAINT WITHOUT DIAGNOSIS: Primary | ICD-10-CM

## 2020-01-04 DIAGNOSIS — R73.09 ELEVATED GLUCOSE: ICD-10-CM

## 2020-01-04 LAB
ALBUMIN SERPL-MCNC: 4.8 G/DL (ref 3.5–5.2)
ALBUMIN/GLOB SERPL: 1.3 G/DL
ALP SERPL-CCNC: 61 U/L (ref 39–117)
ALT SERPL W P-5'-P-CCNC: 7 U/L (ref 1–33)
ANION GAP SERPL CALCULATED.3IONS-SCNC: 13 MMOL/L (ref 5–15)
AST SERPL-CCNC: 17 U/L (ref 1–32)
B-HCG UR QL: NEGATIVE
BASOPHILS # BLD AUTO: 0.01 10*3/MM3 (ref 0–0.2)
BASOPHILS NFR BLD AUTO: 0.1 % (ref 0–1.5)
BILIRUB SERPL-MCNC: 0.3 MG/DL (ref 0.2–1.2)
BILIRUB UR QL STRIP: NEGATIVE
BUN BLD-MCNC: 7 MG/DL (ref 6–20)
BUN/CREAT SERPL: 8.8 (ref 7–25)
CALCIUM SPEC-SCNC: 9.7 MG/DL (ref 8.6–10.5)
CHLORIDE SERPL-SCNC: 103 MMOL/L (ref 98–107)
CLARITY UR: CLEAR
CO2 SERPL-SCNC: 24 MMOL/L (ref 22–29)
COLOR UR: YELLOW
CREAT BLD-MCNC: 0.8 MG/DL (ref 0.57–1)
DEPRECATED RDW RBC AUTO: 39.2 FL (ref 37–54)
EOSINOPHIL # BLD AUTO: 0.05 10*3/MM3 (ref 0–0.4)
EOSINOPHIL NFR BLD AUTO: 0.7 % (ref 0.3–6.2)
ERYTHROCYTE [DISTWIDTH] IN BLOOD BY AUTOMATED COUNT: 14.3 % (ref 12.3–15.4)
GFR SERPL CREATININE-BSD FRML MDRD: 87 ML/MIN/1.73
GLOBULIN UR ELPH-MCNC: 3.6 GM/DL
GLUCOSE BLD-MCNC: 84 MG/DL (ref 65–99)
GLUCOSE UR STRIP-MCNC: NEGATIVE MG/DL
HCT VFR BLD AUTO: 39.7 % (ref 34–46.6)
HGB BLD-MCNC: 12.7 G/DL (ref 12–15.9)
HGB UR QL STRIP.AUTO: NEGATIVE
IMM GRANULOCYTES # BLD AUTO: 0.01 10*3/MM3 (ref 0–0.05)
IMM GRANULOCYTES NFR BLD AUTO: 0.1 % (ref 0–0.5)
KETONES UR QL STRIP: NEGATIVE
LEUKOCYTE ESTERASE UR QL STRIP.AUTO: NEGATIVE
LYMPHOCYTES # BLD AUTO: 1.24 10*3/MM3 (ref 0.7–3.1)
LYMPHOCYTES NFR BLD AUTO: 17.9 % (ref 19.6–45.3)
MCH RBC QN AUTO: 24.4 PG (ref 26.6–33)
MCHC RBC AUTO-ENTMCNC: 32 G/DL (ref 31.5–35.7)
MCV RBC AUTO: 76.2 FL (ref 79–97)
MONOCYTES # BLD AUTO: 0.37 10*3/MM3 (ref 0.1–0.9)
MONOCYTES NFR BLD AUTO: 5.3 % (ref 5–12)
NEUTROPHILS # BLD AUTO: 5.24 10*3/MM3 (ref 1.7–7)
NEUTROPHILS NFR BLD AUTO: 75.9 % (ref 42.7–76)
NITRITE UR QL STRIP: NEGATIVE
NRBC BLD AUTO-RTO: 0 /100 WBC (ref 0–0.2)
PH UR STRIP.AUTO: 6.5 [PH] (ref 5–8)
PLATELET # BLD AUTO: 232 10*3/MM3 (ref 140–450)
PMV BLD AUTO: 11.2 FL (ref 6–12)
POTASSIUM BLD-SCNC: 4.2 MMOL/L (ref 3.5–5.2)
PROT SERPL-MCNC: 8.4 G/DL (ref 6–8.5)
PROT UR QL STRIP: NEGATIVE
RBC # BLD AUTO: 5.21 10*6/MM3 (ref 3.77–5.28)
SODIUM BLD-SCNC: 140 MMOL/L (ref 136–145)
SP GR UR STRIP: <=1.005 (ref 1–1.03)
UROBILINOGEN UR QL STRIP: NORMAL
WBC NRBC COR # BLD: 6.92 10*3/MM3 (ref 3.4–10.8)

## 2020-01-04 PROCEDURE — 99284 EMERGENCY DEPT VISIT MOD MDM: CPT

## 2020-01-04 PROCEDURE — 81025 URINE PREGNANCY TEST: CPT | Performed by: EMERGENCY MEDICINE

## 2020-01-04 PROCEDURE — 81003 URINALYSIS AUTO W/O SCOPE: CPT | Performed by: EMERGENCY MEDICINE

## 2020-01-04 PROCEDURE — 85025 COMPLETE CBC W/AUTO DIFF WBC: CPT | Performed by: EMERGENCY MEDICINE

## 2020-01-04 PROCEDURE — 80053 COMPREHEN METABOLIC PANEL: CPT | Performed by: EMERGENCY MEDICINE

## 2020-01-04 RX ORDER — ACETAMINOPHEN 500 MG
1000 TABLET ORAL ONCE
Status: COMPLETED | OUTPATIENT
Start: 2020-01-04 | End: 2020-01-04

## 2020-01-04 RX ADMIN — ACETAMINOPHEN 1000 MG: 500 TABLET, FILM COATED ORAL at 23:13

## 2020-01-04 RX ADMIN — SODIUM CHLORIDE 1000 ML: 9 INJECTION, SOLUTION INTRAVENOUS at 22:58

## 2020-01-05 VITALS
SYSTOLIC BLOOD PRESSURE: 96 MMHG | HEIGHT: 59 IN | WEIGHT: 94.6 LBS | TEMPERATURE: 98 F | BODY MASS INDEX: 19.07 KG/M2 | DIASTOLIC BLOOD PRESSURE: 65 MMHG | RESPIRATION RATE: 18 BRPM | HEART RATE: 78 BPM | OXYGEN SATURATION: 100 %

## 2020-01-05 NOTE — ED PROVIDER NOTES
Subjective   24-year-old female presenting with concerns about her glucose.  She states that she delivered her son almost a year ago, she had gestational diabetes during that time.  Has not had any issues until few weeks ago she had some tingling all over.  Since then she has been checking her sugar fairly frequently.  She notes that after drinking a Pepsi or Mountain Dew her glucose goes up to about 150.  She has not been drinking much water.  Today she had a cappuccino and the sugar went up to 200.  She became concerned so presented for evaluation.  She denies any other complaints or concerns.          Review of Systems   Constitutional: Negative.    HENT: Negative.    Eyes: Negative.    Respiratory: Negative.    Cardiovascular: Negative.    Gastrointestinal: Negative.    Genitourinary: Negative.    Musculoskeletal: Negative.    Skin: Negative.    Neurological: Negative.    Psychiatric/Behavioral: Negative.        Past Medical History:   Diagnosis Date   • Abnormal Pap smear of cervix 2015    ASCUS +HPV   • Anxiety and depression    • Body piercing     LOWER LIP, TOP OF LEFT EAR   • Constipation    • Depression    • GERD (gastroesophageal reflux disease)    • Hearing loss     REPORTS USES NO HEARING DEVICES   • History of blood transfusion 2016    REPORTS NO REACTION   • History of Papanicolaou smear of cervix 2016    NORMAL    • HPV in female    • Hypoglycemia    • Migraine    • Ovarian cyst 2017    RIGHT    • Rubella non-immune status, antepartum 2018   • Tattoos     1 ON LEFT WRIST, BILATERAL SHOULDER BLADES, LEFT LEG       Allergies   Allergen Reactions   • Latex Hives       Past Surgical History:   Procedure Laterality Date   •  SECTION  2016    DR MARC SALINAS   •  SECTION WITH TUBAL N/A 2019    Procedure:  SECTION REPEAT WITH TUBAL;  Surgeon: Luis Miguel Martel MD;  Location: [facility] LABOR DELIVERY;  Service: Obstetrics/Gynecology   • EAR TUBES          Family History   Problem Relation Age of Onset   • Cancer Other    • Diabetes Other    • Hypertension Other    • Hyperlipidemia Other    • Liver disease Other         aunt has liver disease from hep c   • Mental illness Other    • Thyroid disease Other    • Ulcers Mother        Social History     Socioeconomic History   • Marital status:      Spouse name: Not on file   • Number of children: Not on file   • Years of education: Not on file   • Highest education level: Not on file   Tobacco Use   • Smoking status: Never Smoker   • Smokeless tobacco: Never Used   Substance and Sexual Activity   • Alcohol use: No   • Drug use: No   • Sexual activity: Yes     Partners: Male           Objective   Physical Exam   Constitutional: She is oriented to person, place, and time. She appears well-developed and well-nourished. No distress.   HENT:   Head: Normocephalic and atraumatic.   Right Ear: External ear normal.   Left Ear: External ear normal.   Nose: Nose normal.   Mouth/Throat: Oropharynx is clear and moist.   Eyes: Pupils are equal, round, and reactive to light. Conjunctivae and EOM are normal.   Neck: Normal range of motion. Neck supple.   Cardiovascular: Regular rhythm, normal heart sounds and intact distal pulses.   Mild tachycardia   Pulmonary/Chest: Effort normal and breath sounds normal. No respiratory distress.   Abdominal: Soft. Bowel sounds are normal. She exhibits no distension. There is no tenderness. There is no rebound and no guarding.   Musculoskeletal: Normal range of motion. She exhibits no edema, tenderness or deformity.   Neurological: She is alert and oriented to person, place, and time.   Skin: Skin is warm and dry. No rash noted.   Psychiatric: She has a normal mood and affect. Her behavior is normal.   Nursing note and vitals reviewed.      Procedures           ED Course                                               MDM  Number of Diagnoses or Management Options  Elevated glucose:    Feared complaint without diagnosis:   Diagnosis management comments: 24-year-old female with concerns about her glucose.  Well-developed, well-nourished young female in no distress with exam as above.  She is mildly tachycardic.  Her exam is fairly unremarkable otherwise.  Will obtain basic labs and give IV fluids.  Disposition is likely to home.    DDX: Hyperglycemia, dehydration, electrolyte abnormality    Work-up here is without any acute or significant abnormality.  Will discharge home with outpatient follow-up.       Amount and/or Complexity of Data Reviewed  Clinical lab tests: reviewed        Final diagnoses:   Feared complaint without diagnosis   Elevated glucose            Phill Abdi MD  01/05/20 0025

## 2020-03-04 ENCOUNTER — HOSPITAL ENCOUNTER (OUTPATIENT)
Dept: GENERAL RADIOLOGY | Facility: HOSPITAL | Age: 25
Discharge: HOME | End: 2020-03-04
Admitting: NURSE PRACTITIONER

## 2020-03-04 ENCOUNTER — TRANSCRIBE ORDERS (OUTPATIENT)
Dept: GENERAL RADIOLOGY | Facility: HOSPITAL | Age: 25
End: 2020-03-04

## 2020-03-04 DIAGNOSIS — M41.9 SCOLIOSIS, UNSPECIFIED SCOLIOSIS TYPE, UNSPECIFIED SPINAL REGION: ICD-10-CM

## 2020-03-04 DIAGNOSIS — M54.9 BACK PAIN, UNSPECIFIED BACK LOCATION, UNSPECIFIED BACK PAIN LATERALITY, UNSPECIFIED CHRONICITY: ICD-10-CM

## 2020-03-04 DIAGNOSIS — M41.9 SCOLIOSIS, UNSPECIFIED SCOLIOSIS TYPE, UNSPECIFIED SPINAL REGION: Primary | ICD-10-CM

## 2020-03-04 PROCEDURE — 72081 X-RAY EXAM ENTIRE SPI 1 VW: CPT

## 2020-09-24 ENCOUNTER — APPOINTMENT (OUTPATIENT)
Dept: CT IMAGING | Facility: HOSPITAL | Age: 25
End: 2020-09-24

## 2020-09-24 ENCOUNTER — HOSPITAL ENCOUNTER (EMERGENCY)
Facility: HOSPITAL | Age: 25
Discharge: HOME | End: 2020-09-24
Attending: STUDENT IN AN ORGANIZED HEALTH CARE EDUCATION/TRAINING PROGRAM | Admitting: STUDENT IN AN ORGANIZED HEALTH CARE EDUCATION/TRAINING PROGRAM

## 2020-09-24 VITALS
HEIGHT: 59 IN | BODY MASS INDEX: 18.55 KG/M2 | WEIGHT: 92 LBS | DIASTOLIC BLOOD PRESSURE: 71 MMHG | SYSTOLIC BLOOD PRESSURE: 122 MMHG | OXYGEN SATURATION: 100 % | HEART RATE: 100 BPM | TEMPERATURE: 98.4 F | RESPIRATION RATE: 16 BRPM

## 2020-09-24 DIAGNOSIS — R10.9 ABDOMINAL PAIN, UNSPECIFIED ABDOMINAL LOCATION: Primary | ICD-10-CM

## 2020-09-24 LAB
B-HCG UR QL: NEGATIVE
BILIRUB UR QL STRIP: NEGATIVE
CLARITY UR: CLEAR
COLOR UR: YELLOW
GLUCOSE UR STRIP-MCNC: NEGATIVE MG/DL
HGB UR QL STRIP.AUTO: NEGATIVE
KETONES UR QL STRIP: NEGATIVE
LEUKOCYTE ESTERASE UR QL STRIP.AUTO: NEGATIVE
NITRITE UR QL STRIP: NEGATIVE
PH UR STRIP.AUTO: <=5 [PH] (ref 5–8)
PROT UR QL STRIP: NEGATIVE
SP GR UR STRIP: 1.01 (ref 1–1.03)
UROBILINOGEN UR QL STRIP: NORMAL

## 2020-09-24 PROCEDURE — 81003 URINALYSIS AUTO W/O SCOPE: CPT | Performed by: PHYSICIAN ASSISTANT

## 2020-09-24 PROCEDURE — 99283 EMERGENCY DEPT VISIT LOW MDM: CPT

## 2020-09-24 PROCEDURE — 74176 CT ABD & PELVIS W/O CONTRAST: CPT

## 2020-09-24 PROCEDURE — 81025 URINE PREGNANCY TEST: CPT | Performed by: PHYSICIAN ASSISTANT

## 2020-09-24 NOTE — ED PROVIDER NOTES
Subjective   24-year-old female presents with left flank pain.  She has had left flank pain since earlier today.  No known injury.  It hurts on the left side of her abdomen and radiates downward to her lower abdomen.  No urinary symptoms.  No nausea vomiting fever chills.  The pain is worse to touch and with movement.      History provided by:  Patient   used: No        Review of Systems   Gastrointestinal: Positive for abdominal pain.   All other systems reviewed and are negative.      Past Medical History:   Diagnosis Date   • Abnormal Pap smear of cervix 2015    ASCUS +HPV   • Anxiety and depression    • Body piercing     LOWER LIP, TOP OF LEFT EAR   • Constipation    • Depression    • GERD (gastroesophageal reflux disease)    • Hearing loss     REPORTS USES NO HEARING DEVICES   • History of blood transfusion 2016    REPORTS NO REACTION   • History of Papanicolaou smear of cervix 2016    NORMAL    • HPV in female    • Hypoglycemia    • Migraine    • Ovarian cyst 2017    RIGHT    • Rubella non-immune status, antepartum 2018   • Tattoos     1 ON LEFT WRIST, BILATERAL SHOULDER BLADES, LEFT LEG       Allergies   Allergen Reactions   • Latex Hives       Past Surgical History:   Procedure Laterality Date   •  SECTION  2016    DR MARC SALINAS   •  SECTION WITH TUBAL N/A 2019    Procedure:  SECTION REPEAT WITH TUBAL;  Surgeon: Luis Miguel Martel MD;  Location: [facility] LABOR DELIVERY;  Service: Obstetrics/Gynecology   • EAR TUBES         Family History   Problem Relation Age of Onset   • Cancer Other    • Diabetes Other    • Hypertension Other    • Hyperlipidemia Other    • Liver disease Other         aunt has liver disease from hep c   • Mental illness Other    • Thyroid disease Other    • Ulcers Mother        Social History     Socioeconomic History   • Marital status:      Spouse name: Not on file   • Number of children: Not on file   •  Years of education: Not on file   • Highest education level: Not on file   Tobacco Use   • Smoking status: Never Smoker   • Smokeless tobacco: Never Used   Substance and Sexual Activity   • Alcohol use: No   • Drug use: No   • Sexual activity: Yes     Partners: Male           Objective   Physical Exam  Vitals signs and nursing note reviewed.   Constitutional:       Appearance: She is well-developed.   Neck:      Musculoskeletal: Normal range of motion and neck supple.   Cardiovascular:      Rate and Rhythm: Normal rate and regular rhythm.   Pulmonary:      Effort: Pulmonary effort is normal.      Breath sounds: Normal breath sounds.   Abdominal:      General: Bowel sounds are normal.      Palpations: Abdomen is soft.      Tenderness: There is abdominal tenderness in the left lower quadrant. There is no guarding.   Musculoskeletal: Normal range of motion.   Skin:     General: Skin is warm and dry.   Neurological:      Mental Status: She is alert and oriented to person, place, and time.      Deep Tendon Reflexes: Reflexes are normal and symmetric.         Procedures           ED Course                                           MDM  Number of Diagnoses or Management Options  Abdominal pain, unspecified abdominal location: new and requires workup     Amount and/or Complexity of Data Reviewed  Clinical lab tests: reviewed  Tests in the radiology section of CPT®: reviewed    Risk of Complications, Morbidity, and/or Mortality  Presenting problems: minimal  Diagnostic procedures: minimal  Management options: minimal    Patient Progress  Patient progress: stable      Final diagnoses:   Abdominal pain, unspecified abdominal location            Mark Jasmine Jr., PA-C  09/24/20 2046

## 2021-04-11 NOTE — PROGRESS NOTES
"  Subjective     PROBLEM LIST:  1. Anemia  2. GERD  3. Depression  4. migraines    CHIEF COMPLAINT: anemia      HISTORY OF PRESENT ILLNESS:  The patient is a 25 y.o. female, referred for evaluation of anemia.    Labs 12/11/20: hgb 12.5, RBC 5.32 (high), MCV 75.4, plt 248, iron 36, iron sat 9%, ferritin 5    Labs 10/19/20: hgb 11.0, mcv 73.2    She was prescribed oral iron but has not been taking it because of an interaction with her allergy treatment.  She also says that oral iron has not been effective for her.    She has heavy menses, 5-7 days of heavy bleeding with each cycle.  S/p BTL.  She has been referred to GYN but has not yet scheduled an appt.    REVIEW OF SYSTEMS:  A 14 point review of systems was performed and is negative except as noted above.    Past Medical History:   Diagnosis Date   • Abnormal Pap smear of cervix 06/11/2015    ASCUS +HPV   • Anxiety and depression    • Body piercing     LOWER LIP, TOP OF LEFT EAR   • Constipation    • Depression    • GERD (gastroesophageal reflux disease)    • Hearing loss     REPORTS USES NO HEARING DEVICES   • History of blood transfusion 2016    REPORTS NO REACTION   • History of Papanicolaou smear of cervix 07/29/2016    NORMAL    • HPV in female    • Hypoglycemia    • Migraine    • Ovarian cyst 2017    RIGHT    • Rubella non-immune status, antepartum 07/24/2018   • Tattoos     1 ON LEFT WRIST, BILATERAL SHOULDER BLADES, LEFT LEG           Objective     /66   Pulse 97   Temp 97.7 °F (36.5 °C) (Temporal)   Resp 16   Ht 149.9 cm (59\")   Wt 40.8 kg (90 lb)   SpO2 98%   BMI 18.18 kg/m²   Performance Status:0              General: well appearing female in no acute distress  Neuro: alert and oriented  HEENT: sclerae anicteric, oropharynx clear  Extremities: no lower extremity edema  Skin: no rashes, lesions, bruising, or petechiae  Psych: mood and affect appropriate    Lab Results   Component Value Date    WBC 6.92 01/04/2020    HGB 12.7 01/04/2020    HCT " 39.7 01/04/2020    MCV 76.2 (L) 01/04/2020     01/04/2020     Lab Results   Component Value Date    GLUCOSE 84 01/04/2020    BUN 7 01/04/2020    CREATININE 0.80 01/04/2020    EGFRIFNONA 87 01/04/2020    BCR 8.8 01/04/2020    K 4.2 01/04/2020    CO2 24.0 01/04/2020    CALCIUM 9.7 01/04/2020    ALBUMIN 4.80 01/04/2020    LABIL2 1.3 10/26/2016    AST 17 01/04/2020    ALT 7 01/04/2020         Assessment/Plan     Cortney Boucher is a 25 y.o. year old female with iron deficiency anemia secondary to menorrhagia.    She has not responded to oral iron supplementation.  We discussed treatment with feraheme.    She had an elevated RBC count on one set of labs, but this is not a consistent finding.  If microcytosis does not correct with iron replacement, could consider testing for thalassemia.    F/u 2 months with repeat labwork after feraheme.           A total greater than 45 mins minutes was spent in face to face patient time, examination, counseling, charting, reviewing test results, and reviewing outside records.    Emily Geller MD    4/13/2021

## 2021-04-13 ENCOUNTER — CONSULT (OUTPATIENT)
Dept: ONCOLOGY | Facility: CLINIC | Age: 26
End: 2021-04-13

## 2021-04-13 VITALS
SYSTOLIC BLOOD PRESSURE: 111 MMHG | BODY MASS INDEX: 18.14 KG/M2 | DIASTOLIC BLOOD PRESSURE: 66 MMHG | OXYGEN SATURATION: 98 % | TEMPERATURE: 97.7 F | WEIGHT: 90 LBS | HEIGHT: 59 IN | HEART RATE: 97 BPM | RESPIRATION RATE: 16 BRPM

## 2021-04-13 DIAGNOSIS — D50.8 IRON DEFICIENCY ANEMIA SECONDARY TO INADEQUATE DIETARY IRON INTAKE: Primary | ICD-10-CM

## 2021-04-13 DIAGNOSIS — K90.9 MALABSORPTION OF IRON: ICD-10-CM

## 2021-04-13 DIAGNOSIS — D50.0 IRON DEFICIENCY ANEMIA DUE TO CHRONIC BLOOD LOSS: Primary | ICD-10-CM

## 2021-04-13 PROBLEM — D64.9 ANEMIA, UNSPECIFIED: Status: ACTIVE | Noted: 2021-04-13

## 2021-04-13 PROCEDURE — 99204 OFFICE O/P NEW MOD 45 MIN: CPT | Performed by: INTERNAL MEDICINE

## 2021-04-13 RX ORDER — HYDROXYZINE PAMOATE 25 MG/1
25 CAPSULE ORAL NIGHTLY PRN
COMMUNITY
Start: 2021-03-09

## 2021-04-13 RX ORDER — DIPHENHYDRAMINE HYDROCHLORIDE 25 MG/1
CAPSULE ORAL
COMMUNITY
Start: 2021-03-09

## 2021-04-13 RX ORDER — ALBUTEROL SULFATE 90 UG/1
AEROSOL, METERED RESPIRATORY (INHALATION)
COMMUNITY
Start: 2021-02-04

## 2021-04-13 RX ORDER — SODIUM CHLORIDE 9 MG/ML
250 INJECTION, SOLUTION INTRAVENOUS ONCE
Status: CANCELLED | OUTPATIENT
Start: 2021-04-23

## 2021-04-13 RX ORDER — LEVOCETIRIZINE DIHYDROCHLORIDE 5 MG/1
5 TABLET, FILM COATED ORAL NIGHTLY PRN
COMMUNITY
Start: 2021-02-04

## 2021-04-13 RX ORDER — PREDNISONE 10 MG/1
10 TABLET ORAL DAILY
COMMUNITY

## 2021-04-13 RX ORDER — CEFDINIR 300 MG/1
300 CAPSULE ORAL 2 TIMES DAILY
COMMUNITY

## 2021-04-14 ENCOUNTER — TELEPHONE (OUTPATIENT)
Dept: ONCOLOGY | Facility: CLINIC | Age: 26
End: 2021-04-14

## 2021-04-14 DIAGNOSIS — K90.9 MALABSORPTION OF IRON: ICD-10-CM

## 2021-04-14 DIAGNOSIS — D50.8 IRON DEFICIENCY ANEMIA SECONDARY TO INADEQUATE DIETARY IRON INTAKE: Primary | ICD-10-CM

## 2021-04-14 NOTE — TELEPHONE ENCOUNTER
I called and left voicemail for patient to get labs done this week for insurance auth.  I put in orders per Dr. Geller.

## 2021-04-14 NOTE — TELEPHONE ENCOUNTER
----- Message from Emily Geller MD sent at 4/14/2021 10:02 AM EDT -----  Hmmm I was just going to draw them on day 1.  I guess if we have to we can have her come back and get them.  ----- Message -----  From: Lalita Mullen, RN  Sent: 4/14/2021   7:53 AM EDT  To: Emily Geller MD    Do you want me to call patient and see if she will go to lab this week?    ----- Message -----  From: Tessie Hendricks  Sent: 4/14/2021   6:01 AM EDT  To: Kassandra Peng, Lalita Mullen, STEFANIA Celis,  We need Iron profile and Ferritin to submit for Feraheme.  The last ones that I see are from 12/2020 and I don't think they will authorize with 4 month old labs.     Tessie  ----- Message -----  From: Lalita Mullen, RN  Sent: 4/13/2021   3:16 PM EDT  To: Irina Saravia, Kassandra Peng     The patient is ordered to start feraheme in [place] April 23.

## 2021-04-16 ENCOUNTER — LAB (OUTPATIENT)
Dept: LAB | Facility: HOSPITAL | Age: 26
End: 2021-04-16

## 2021-04-16 DIAGNOSIS — D50.0 IRON DEFICIENCY ANEMIA DUE TO CHRONIC BLOOD LOSS: ICD-10-CM

## 2021-04-16 DIAGNOSIS — D50.8 IRON DEFICIENCY ANEMIA SECONDARY TO INADEQUATE DIETARY IRON INTAKE: ICD-10-CM

## 2021-04-16 DIAGNOSIS — K90.9 MALABSORPTION OF IRON: ICD-10-CM

## 2021-04-16 LAB
BASOPHILS # BLD AUTO: 0.01 10*3/MM3 (ref 0–0.2)
BASOPHILS NFR BLD AUTO: 0.2 % (ref 0–1.5)
DEPRECATED RDW RBC AUTO: 37.6 FL (ref 37–54)
EOSINOPHIL # BLD AUTO: 0.08 10*3/MM3 (ref 0–0.4)
EOSINOPHIL NFR BLD AUTO: 1.4 % (ref 0.3–6.2)
ERYTHROCYTE [DISTWIDTH] IN BLOOD BY AUTOMATED COUNT: 14 % (ref 12.3–15.4)
FERRITIN SERPL-MCNC: 5.74 NG/ML (ref 13–150)
HCT VFR BLD AUTO: 39.1 % (ref 34–46.6)
HGB BLD-MCNC: 12.2 G/DL (ref 12–15.9)
IMM GRANULOCYTES # BLD AUTO: 0.01 10*3/MM3 (ref 0–0.05)
IMM GRANULOCYTES NFR BLD AUTO: 0.2 % (ref 0–0.5)
IRON 24H UR-MRATE: 17 MCG/DL (ref 37–145)
IRON SATN MFR SERPL: 4 % (ref 20–50)
LYMPHOCYTES # BLD AUTO: 1.41 10*3/MM3 (ref 0.7–3.1)
LYMPHOCYTES NFR BLD AUTO: 24.1 % (ref 19.6–45.3)
MCH RBC QN AUTO: 23.6 PG (ref 26.6–33)
MCHC RBC AUTO-ENTMCNC: 31.2 G/DL (ref 31.5–35.7)
MCV RBC AUTO: 75.6 FL (ref 79–97)
MONOCYTES # BLD AUTO: 0.51 10*3/MM3 (ref 0.1–0.9)
MONOCYTES NFR BLD AUTO: 8.7 % (ref 5–12)
NEUTROPHILS NFR BLD AUTO: 3.84 10*3/MM3 (ref 1.7–7)
NEUTROPHILS NFR BLD AUTO: 65.4 % (ref 42.7–76)
NRBC BLD AUTO-RTO: 0 /100 WBC (ref 0–0.2)
PLATELET # BLD AUTO: 217 10*3/MM3 (ref 140–450)
PMV BLD AUTO: 11.8 FL (ref 6–12)
RBC # BLD AUTO: 5.17 10*6/MM3 (ref 3.77–5.28)
TIBC SERPL-MCNC: 474 MCG/DL (ref 298–536)
TRANSFERRIN SERPL-MCNC: 318 MG/DL (ref 200–360)
WBC # BLD AUTO: 5.86 10*3/MM3 (ref 3.4–10.8)

## 2021-04-16 PROCEDURE — 36415 COLL VENOUS BLD VENIPUNCTURE: CPT

## 2021-04-16 PROCEDURE — 82728 ASSAY OF FERRITIN: CPT

## 2021-04-16 PROCEDURE — 85025 COMPLETE CBC W/AUTO DIFF WBC: CPT

## 2021-04-16 PROCEDURE — 83540 ASSAY OF IRON: CPT

## 2021-04-16 PROCEDURE — 84466 ASSAY OF TRANSFERRIN: CPT

## 2021-04-23 ENCOUNTER — INFUSION (OUTPATIENT)
Dept: ONCOLOGY | Facility: HOSPITAL | Age: 26
End: 2021-04-23

## 2021-04-23 VITALS — HEART RATE: 90 BPM | DIASTOLIC BLOOD PRESSURE: 64 MMHG | SYSTOLIC BLOOD PRESSURE: 110 MMHG

## 2021-04-23 DIAGNOSIS — K90.9 MALABSORPTION OF IRON: ICD-10-CM

## 2021-04-23 DIAGNOSIS — D50.8 IRON DEFICIENCY ANEMIA SECONDARY TO INADEQUATE DIETARY IRON INTAKE: Primary | ICD-10-CM

## 2021-04-23 PROCEDURE — 96374 THER/PROPH/DIAG INJ IV PUSH: CPT

## 2021-04-23 PROCEDURE — 25010000002 FERUMOXYTOL 510 MG/17ML SOLUTION 510 MG VIAL: Performed by: INTERNAL MEDICINE

## 2021-04-23 RX ORDER — SODIUM CHLORIDE 9 MG/ML
250 INJECTION, SOLUTION INTRAVENOUS ONCE
OUTPATIENT
Start: 2021-04-30

## 2021-04-23 RX ORDER — SODIUM CHLORIDE 9 MG/ML
250 INJECTION, SOLUTION INTRAVENOUS ONCE
Status: DISCONTINUED | OUTPATIENT
Start: 2021-04-23 | End: 2021-04-23 | Stop reason: HOSPADM

## 2021-04-23 RX ADMIN — FERUMOXYTOL 510 MG: 510 INJECTION INTRAVENOUS at 14:24

## 2023-07-06 NOTE — PROGRESS NOTES
TC per RN -   Pt had received bolus of IVF's & zofran very early after arrival with c/o n/v/d.  She desired to go home if we would call in zofran.  RN States pt with various complaints through evening.  She did have a spell of diarrhea & received imodium.  She c/o nausea also returned and received phenergan.  RN reports pt resting quietly - no contractions per monitor.  States pts father came out stating pt was hurting.   RN reports she did an exam - earlier cx was closed inner os and outer os 2 & thick -3.  She stated she feels there is a change to 1 cm inner OS /outer os 2 / still thick - no bloody show or fluid leaking.  Baby does appear to have some tachycardia though + variability.    Pts BS 89 earlier.   Plan:  RN to evaluate VS / pts contractions/BS/  RN states pt abdomen soft and non-tender.  She cannot palpate contractions - and pt cannot state if she is having contractions.   Pt states she has low back pain and pain in legs & is constant.    Arrived in  to see pt - RN's report pt is talking on phone - no longer with complaints at this time.      See H&P note              Constitutional: No fever, chills.  Eyes: No visual changes.  ENT: No hearing changes.  Neck: No neck pain or stiffness.  Cardiovascular: see hpi  Pulmonary: No SOB, cough. No hemoptysis.  Abdominal:  No nausea, vomiting, diarrhea.  : No dysuria, frequency.  Neuro: No headache, syncope, dizziness.  MS: No back pain. No calf pain/swelling.  Psych: No suicidal ideations.

## (undated) DEVICE — PROXIMATE SKIN STAPLERS (35 WIDE) CONTAINS 35 STAINLESS STEEL STAPLES (FIXED HEAD): Brand: PROXIMATE

## (undated) DEVICE — RICH C-SECTION: Brand: MEDLINE INDUSTRIES, INC.

## (undated) DEVICE — GLV SURG BIOGEL M LTX PF 8

## (undated) DEVICE — PAD SANI MAXI W/ADHS SNG WRP 11IN

## (undated) DEVICE — GOWN,SIRUS,NON REINFRCD XL XLONG: Brand: MEDLINE

## (undated) DEVICE — CUFF SCD HEMOFORCE SEQ CALF STD MD

## (undated) DEVICE — SUT VICRYL 3/0 CT1 27IN J258H

## (undated) DEVICE — CUP EXTRCT VAC

## (undated) DEVICE — SUT GUT CHRM 2/0 SH 27IN G123H

## (undated) DEVICE — GOWN,SIRUS,NON REINFRCD,LARGE,SET IN SL: Brand: MEDLINE

## (undated) DEVICE — SUT PDS 0 CT 36IN DYED Z358T

## (undated) DEVICE — APPL CHLORAPREP W/TINT 26ML ORNG

## (undated) DEVICE — PAD,ABDOMINAL,5"X9",STERILE,LF,1/PK: Brand: MEDLINE INDUSTRIES, INC.

## (undated) DEVICE — SUT GUT CHRM 1 CTX 36IN 905H

## (undated) DEVICE — SPNG GZ WOVN 4X4IN 12PLY 10/BX STRL

## (undated) DEVICE — LARGE, DISPOSABLE ALEXIS O C-SECTION PROTECTOR - RETRACTOR: Brand: ALEXIS ® O C-SECTION PROTECTOR - RETRACTOR